# Patient Record
Sex: MALE | Race: WHITE | NOT HISPANIC OR LATINO | Employment: OTHER | URBAN - NONMETROPOLITAN AREA
[De-identification: names, ages, dates, MRNs, and addresses within clinical notes are randomized per-mention and may not be internally consistent; named-entity substitution may affect disease eponyms.]

---

## 2017-03-06 ENCOUNTER — OFFICE VISIT (OUTPATIENT)
Dept: PSYCHIATRY | Facility: OTHER | Age: 37
End: 2017-03-06
Attending: PSYCHIATRY & NEUROLOGY
Payer: COMMERCIAL

## 2017-03-06 VITALS
HEART RATE: 122 BPM | BODY MASS INDEX: 28.49 KG/M2 | WEIGHT: 215 LBS | DIASTOLIC BLOOD PRESSURE: 72 MMHG | HEIGHT: 73 IN | SYSTOLIC BLOOD PRESSURE: 128 MMHG | TEMPERATURE: 98.2 F | OXYGEN SATURATION: 96 %

## 2017-03-06 DIAGNOSIS — F31.10 BIPOLAR I DISORDER, MOST RECENT EPISODE (OR CURRENT) MANIC (H): ICD-10-CM

## 2017-03-06 DIAGNOSIS — G47.00 PERSISTENT DISORDER OF INITIATING OR MAINTAINING SLEEP: Primary | ICD-10-CM

## 2017-03-06 PROCEDURE — 93010 ELECTROCARDIOGRAM REPORT: CPT | Performed by: INTERNAL MEDICINE

## 2017-03-06 PROCEDURE — 93005 ELECTROCARDIOGRAM TRACING: CPT

## 2017-03-06 PROCEDURE — 99214 OFFICE O/P EST MOD 30 MIN: CPT | Mod: 25 | Performed by: PSYCHIATRY & NEUROLOGY

## 2017-03-06 PROCEDURE — 99212 OFFICE O/P EST SF 10 MIN: CPT | Mod: 25

## 2017-03-06 RX ORDER — DOXEPIN 6 MG/1
6 TABLET, FILM COATED ORAL AT BEDTIME
Qty: 30 TABLET | Refills: 6 | Status: SHIPPED | OUTPATIENT
Start: 2017-03-06 | End: 2017-03-14

## 2017-03-06 ASSESSMENT — ANXIETY QUESTIONNAIRES
6. BECOMING EASILY ANNOYED OR IRRITABLE: MORE THAN HALF THE DAYS
5. BEING SO RESTLESS THAT IT IS HARD TO SIT STILL: MORE THAN HALF THE DAYS
GAD7 TOTAL SCORE: 14
IF YOU CHECKED OFF ANY PROBLEMS ON THIS QUESTIONNAIRE, HOW DIFFICULT HAVE THESE PROBLEMS MADE IT FOR YOU TO DO YOUR WORK, TAKE CARE OF THINGS AT HOME, OR GET ALONG WITH OTHER PEOPLE: SOMEWHAT DIFFICULT
2. NOT BEING ABLE TO STOP OR CONTROL WORRYING: MORE THAN HALF THE DAYS
1. FEELING NERVOUS, ANXIOUS, OR ON EDGE: SEVERAL DAYS
3. WORRYING TOO MUCH ABOUT DIFFERENT THINGS: MORE THAN HALF THE DAYS
7. FEELING AFRAID AS IF SOMETHING AWFUL MIGHT HAPPEN: NEARLY EVERY DAY

## 2017-03-06 ASSESSMENT — PATIENT HEALTH QUESTIONNAIRE - PHQ9: 5. POOR APPETITE OR OVEREATING: MORE THAN HALF THE DAYS

## 2017-03-06 ASSESSMENT — PAIN SCALES - GENERAL: PAINLEVEL: NO PAIN (0)

## 2017-03-06 NOTE — MR AVS SNAPSHOT
"              After Visit Summary   3/6/2017    Jani Langford    MRN: [id number]           Patient Information     Date Of Birth          1980        Visit Information        Provider Department      3/6/2017 10:00 AM Renee Vuong MD [facility]        Today's Diagnoses     Persistent disorder of initiating or maintaining sleep    -  1    Bipolar I disorder, most recent episode (or current) manic (H)           Follow-ups after your visit        Your next 10 appointments already scheduled     May 08, 2017  9:00 AM CDT   (Arrive by 8:45 AM)   Return Visit with MD Katie Castilloview [facility] ([facility])    [address] [place] [id number] [phone]              Who to contact     If you have questions or need follow up information about today's clinic visit or your schedule please contact [facility] directly at [phone].  Normal or non-critical lab and imaging results will be communicated to you by MyChart, letter or phone within 4 business days after the clinic has received the results. If you do not hear from us within 7 days, please contact the clinic through MyChart or phone. If you have a critical or abnormal lab result, we will notify you by phone as soon as possible.  Submit refill requests through [employer] or call your pharmacy and they will forward the refill request to us. Please allow 3 business days for your refill to be completed.          Additional Information About Your Visit        MyChart Information     [employer] lets you send messages to your doctor, view your test results, renew your prescriptions, schedule appointments and more. To sign up, go to [email or web address] . Click on \"Log in\" on the left side of the screen, which will take you to the Welcome page. Then click on \"Sign up Now\" on the right side of the page.     You will be asked to enter the access code listed below, as well as some personal information. " "Please follow the directions to create your username and password.     Your access code is: [email or web address]  Expires: 3/14/2017  2:23 PM     Your access code will  in 90 days. If you need help or a new code, please call your [place] clinic or [phone].        Care EveryWhere ID     This is your Care EveryWhere ID. This could be used by other organizations to access your [place] medical records  [id number]        Your Vitals Were     Pulse Temperature Height Pulse Oximetry BMI (Body Mass Index)       122 98.2  F (36.8  C) (Tympanic) 6' 1\" (1.854 m) 96% 28.37 kg/m2        Blood Pressure from Last 3 Encounters:   17 128/72   16 110/75   16 114/66    Weight from Last 3 Encounters:   17 215 lb (97.5 kg)   16 220 lb (99.8 kg)   16 215 lb (97.5 kg)              We Performed the Following     EKG 12-lead complete w/read - (Clinic Performed)          Today's Medication Changes          These changes are accurate as of: 3/6/17 10:25 AM.  If you have any questions, ask your nurse or doctor.               Start taking these medicines.        Dose/Directions    doxepin 6 MG tablet   Commonly known as:  SILENOR   Used for:  Persistent disorder of initiating or maintaining sleep   Started by:  Renee Vuong MD        Dose:  6 mg   Take 1 tablet (6 mg) by mouth At Bedtime   Quantity:  30 tablet   Refills:  6            Where to get your medicines      These medications were sent to [id number] - [place], [place] - [address] [address] [address] [facility] [address]     Phone:  [phone]     doxepin 6 MG tablet                Primary Care Provider Office Phone # Fax #    R Wilmer Mendez [phone]       [facility] [address] [place] [id number]        Thank you!     Thank you for choosing [facility]  for your care. Our goal is always to provide you with excellent care. Hearing back from our patients is one way we can " continue to improve our services. Please take a few minutes to complete the written survey that you may receive in the mail after your visit with us. Thank you!             Your Updated Medication List - Protect others around you: Learn how to safely use, store and throw away your medicines at www.disposemymeds.org.          This list is accurate as of: 3/6/17 10:25 AM.  Always use your most recent med list.                   Brand Name Dispense Instructions for use    albuterol 108 (90 BASE) MCG/ACT Inhaler    PROAIR HFA/PROVENTIL HFA/VENTOLIN HFA    1 Inhaler    Inhale 2 puffs into the lungs every 6 hours as needed for shortness of breath / dyspnea or wheezing       doxepin 6 MG tablet    SILENOR    30 tablet    Take 1 tablet (6 mg) by mouth At Bedtime       nicotine 21 MG/24HR 24 hr patch    NICODERM CQ    30 patch    Place 1 patch onto the skin every 24 hours       * QUEtiapine 50 MG Tb24 24 hr tablet    SEROQUEL XR    60 each    Take 2 tablets (100 mg) by mouth daily       * QUEtiapine 300 MG 24 hr tablet    SEROQUEL XR    60 tablet    Take 2 tablets (600 mg) by mouth At Bedtime       * Notice:  This list has 2 medication(s) that are the same as other medications prescribed for you. Read the directions carefully, and ask your doctor or other care provider to review them with you.

## 2017-03-06 NOTE — NURSING NOTE
"Chief Complaint   Patient presents with     RECHECK     mental health- medication questions       Initial /72 (BP Location: Left arm, Patient Position: Chair, Cuff Size: Adult Regular)  Pulse 122  Temp 98.2  F (36.8  C) (Tympanic)  Ht 6' 1\" (1.854 m)  Wt 215 lb (97.5 kg)  SpO2 96%  BMI 28.37 kg/m2 Estimated body mass index is 28.37 kg/(m^2) as calculated from the following:    Height as of this encounter: 6' 1\" (1.854 m).    Weight as of this encounter: 215 lb (97.5 kg).  Medication Reconciliation: complete     Amy Morrissey LPN      "

## 2017-03-06 NOTE — PROGRESS NOTES
PSYCHIATRY CLINIC PROGRESS NOTE   20 minute medication management, more than 50% of time spent counseling patient on medications, medication side effects, symptom history and management   SUBJECTIVE / INTERIM HISTORY                                                                        Social- with ISABEL Sandhu and her kids Children- Phoebe's kids are 5, 7 , and 10 yo  Last visit 12/2016:  Continue Seroquel  mg am and 600 mg of XR HS. Taking Mirapex for ~2 days now...    - still not sleeping much: 4 hours, 6 would be good if he could.   - only sleeping couple hours still. Waking up still and it's getting worse. Not anxious or worried, just physically can't sleep. Dad Doxepin and Jani wonders if he oculd take  -  fairly recently diagnosed with bipolar disorder - was seeing Nallely at [facility] in [place]  -  Thinks Seroquel helps somewhat and has been on higher dose in past of 800-900.  - aggressive in past while manic.  SUBSTANCE USE- smokes 1/2 PPD since age 17 yo.  past use  Meth will be year in about Kitty clean. , MJ. tx [place] and [facility]. EtOH now rare.    SYMPTOMS-  + middle and early insomnia.  nightmares, flashbacks, detached, angry outbursts, self-destructive, startle response, hypervigilance and fear Sleep now: about 2-3 hours then up for awhile, then another hour. His goal would be 6 continuous.  MEDICAL ROS-  SOB: usually at night. But sometimes during day as well not even with exertion.  increased appetite / weight  MEDICAL / SURGICAL HISTORY                   Patient Active Problem List   Diagnosis     Bipolar disorder with psychotic features (H)     Insomnia     Attention deficit disorder     Posttraumatic stress disorder     ALLERGY   Bee venom; Latex; Percocet [oxycodone-acetaminophen]; Toradol; and Tramadol  MEDICATIONS                                                                                             Current Outpatient Prescriptions   Medication Sig     albuterol (PROAIR HFA,  "PROVENTIL HFA, VENTOLIN HFA) 108 (90 BASE) MCG/ACT inhaler Inhale 2 puffs into the lungs every 6 hours as needed for shortness of breath / dyspnea or wheezing     nicotine (NICODERM CQ) 21 MG/24HR patch 2h hr Place 1 patch onto the skin every 24 hours     QUEtiapine (SEROQUEL XR) 50 MG TB24 Take 2 tablets (100 mg) by mouth daily     QUEtiapine (SEROQUEL XR) 300 MG 24 hr tablet Take 2 tablets (600 mg) by mouth At Bedtime     No current facility-administered medications for this visit.          PAST MEDICATION TRIALS    Prozac (fluoxetine), Zoloft (sertraline), Paxil (paroxetine) and Cymbalta (duloxetine). Paxil sexual SEs and constipation. Mirtazapine ineffective for insomnia.   Elavil (amitriptyline).   Lithium Carbonate, Depakote and Depakot ER (valproate) and Neurontin (gabapentin). Lithium \"that was like eating pennies and nickels\" but helped / effective. Didn't like the blood level draws with lithium   Seroquel (quetiapine).   no older antipsychotics.   Xanax (alprazolam). Doesn't think has been on Buspar   Ambien (zolpidem) and Lunesta (eszopiclone).      VITALS   /72 (BP Location: Left arm, Patient Position: Chair, Cuff Size: Adult Regular)  Pulse 122  Temp 98.2  F (36.8  C) (Tympanic)  Ht 6' 1\" (1.854 m)  Wt 215 lb (97.5 kg)  SpO2 96%  BMI 28.37 kg/m2     PHQ9                     [unfilled]  LABS                                                                                                                         No labs in EMR   MENTAL STATUS EXAM                                                                                        Alert. Oriented to person, place, and date / time. Well groomed, calm, cooperative with good eye contact. No problems with speech or psychomotor behavior. Mood was described as tired and affect was congruent to speech content and full range. Thought process, including associations, was unremarkable and thought content was devoid of suicidal and homicidal ideation and " psychotic thought. No hallucinations. Insight was good. Judgment was intact and adequate for safety. Fund of knowledge was intact. Pt demonstrates no obvious problems with attention, concentration, language, recent or remote memory although these were not formally tested.     ASSESSMENT                                                                                                      HISTORICAL:  Initial psych note 9/14/16        NOTES:    Jani Langford is a 37 yo male with history of bipolar disorder, PTSD and chem dep - on Seroquel total 700 mg and been on for awhile. Main issue is sleep : has not had luck with sleep meds so discussed I will certainly try but given he has not found past meds effective more unlikely we are going to find med that works. WE tried Remeron with no luck. We tried Topamax and no luck. He saw Dr. Garcia  and started on Mirapex. Today we agreed try Doxepin - I want to check EKG though because this is a tCA and he is on Seroquel thus both with risk QTc prolongation... He's fine with this.      TREATMENT RISK STATEMENT:  The risks, benefits, alternatives and potential adverse effects have been explained and are understood by the pt.  The pt agrees to the treatment plan with the ability to do so.   The pt knows to call the clinic for any problems or access emergency care if needed.        DIAGNOSES                 (Use of Axes system will continue, even though absent from DSM 5)         Axis I   - Bipolar I disorder with history of psychotic features  Axis II  - no dx  Axis III - no major medical issues  Axis IV-  Psychosocial Stressors include: mod  Axis V - Global Assessment of Functioning current: 45    PLAN                                                                                                                    1)  MEDICATIONS:         -- Continue Seroquel  mg am and 600 mg of XR HS. Start Doxepin 6 mg HS    2)  THERAPY:  No Change    3)  LABS: EKG today. I asked if we  could get: lipid panel and glucose, LFTs, BMP including fasting glucose but he ate breakfast today    4)  PT MONITOR [call for probs]:  Worsening symptoms, SI/HI, SEs from meds    5)  REFERRALS [CD, medical, other]:  None    6)  RTC:  ~2 months

## 2017-03-07 ASSESSMENT — PATIENT HEALTH QUESTIONNAIRE - PHQ9: SUM OF ALL RESPONSES TO PHQ QUESTIONS 1-9: 13

## 2017-03-07 ASSESSMENT — ANXIETY QUESTIONNAIRES: GAD7 TOTAL SCORE: 14

## 2017-03-14 ENCOUNTER — OFFICE VISIT (OUTPATIENT)
Dept: BEHAVIORAL HEALTH | Facility: OTHER | Age: 37
End: 2017-03-14
Attending: SOCIAL WORKER
Payer: COMMERCIAL

## 2017-03-14 ENCOUNTER — OFFICE VISIT (OUTPATIENT)
Dept: FAMILY MEDICINE | Facility: OTHER | Age: 37
End: 2017-03-14
Attending: FAMILY MEDICINE
Payer: COMMERCIAL

## 2017-03-14 VITALS
WEIGHT: 211 LBS | HEIGHT: 73 IN | SYSTOLIC BLOOD PRESSURE: 104 MMHG | DIASTOLIC BLOOD PRESSURE: 64 MMHG | TEMPERATURE: 97.3 F | BODY MASS INDEX: 27.96 KG/M2 | OXYGEN SATURATION: 96 % | HEART RATE: 93 BPM

## 2017-03-14 DIAGNOSIS — K21.9 GASTROESOPHAGEAL REFLUX DISEASE WITHOUT ESOPHAGITIS: Primary | ICD-10-CM

## 2017-03-14 DIAGNOSIS — R68.82 DECREASED SEX DRIVE: ICD-10-CM

## 2017-03-14 DIAGNOSIS — R69 DIAGNOSIS DEFERRED: Primary | ICD-10-CM

## 2017-03-14 PROCEDURE — 99212 OFFICE O/P EST SF 10 MIN: CPT

## 2017-03-14 PROCEDURE — 99213 OFFICE O/P EST LOW 20 MIN: CPT | Performed by: FAMILY MEDICINE

## 2017-03-14 PROCEDURE — 99207 ZZC NO CHARGE LOS: CPT | Performed by: SOCIAL WORKER

## 2017-03-14 ASSESSMENT — PAIN SCALES - GENERAL: PAINLEVEL: NO PAIN (0)

## 2017-03-14 NOTE — PROGRESS NOTES
"[facility]    Jani Langford, 36 year old, male presents with   Chief Complaint   Patient presents with     Gastrophageal Reflux     having acid reflux symptoms would like to dicuss medication options.      Other     Patient states his sex drive \"isn't there\" unsure if this is due to the medications he is taking.        PAST MEDICAL HISTORY:  History reviewed. No pertinent past medical history.    PAST SURGICAL HISTORY:  Past Surgical History   Procedure Laterality Date     Dental surgery       pulled 2 teeth month ago     Ent surgery       sinus       MEDICATIONS:  Prior to Admission medications    Medication Sig Start Date End Date Taking? Authorizing Provider   omeprazole (PRILOSEC) 20 MG CR capsule Take 1 capsule (20 mg) by mouth daily 3/14/17  Yes BERTIN Mendez MD   albuterol (PROAIR HFA, PROVENTIL HFA, VENTOLIN HFA) 108 (90 BASE) MCG/ACT inhaler Inhale 2 puffs into the lungs every 6 hours as needed for shortness of breath / dyspnea or wheezing 11/25/16  Yes BERTIN Mendez MD   QUEtiapine (SEROQUEL XR) 50 MG TB24 Take 2 tablets (100 mg) by mouth daily 9/14/16  Yes Renee Vuong MD   QUEtiapine (SEROQUEL XR) 300 MG 24 hr tablet Take 2 tablets (600 mg) by mouth At Bedtime 9/14/16  Yes Renee Vuong MD       ALLERGIES:     Allergies   Allergen Reactions     Bee Venom      Latex Hives     Percocet [Oxycodone-Acetaminophen]      Short of breath vomiting     Toradol      seizure     Tramadol      Short of breath and vomiting       ROS:  Constitutional, HEENT, cardiovascular, pulmonary, gi and gu systems are negative, except as otherwise noted.      EXAM:  /64 (BP Location: Left arm, Patient Position: Chair, Cuff Size: Adult Large)  Pulse 93  Temp 97.3  F (36.3  C) (Tympanic)  Ht 6' 1\" (1.854 m)  Wt 211 lb (95.7 kg)  SpO2 96%  BMI 27.84 kg/m2 Body mass index is 27.84 kg/(m^2).   GENERAL APPEARANCE: healthy, alert and no distress  EYES: Eyes grossly normal to inspection, PERRL and " conjunctivae and sclerae normal  RESP: lungs clear to auscultation - no rales, rhonchi or wheezes  CV: regular rates and rhythm, normal S1 S2, no S3 or S4 and no murmur, click or rub  ABDOMEN: soft, nontender, without hepatosplenomegaly or masses and bowel sounds normal  Lab/ X-ray  No results found for this or any previous visit (from the past 24 hour(s)).    ASSESSMENT/PLAN:    ICD-10-CM    1. Gastroesophageal reflux disease without esophagitis K21.9 omeprazole (PRILOSEC) 20 MG CR capsule. Start one daily.  We'll see him in 2 months for recheck.  Did discuss the importance for him to avoid late night meals and elevate the head of the bed.  He does smoke.  I told him to quit smoking.  He also takes in caffeine he'll try to reduce that.  He does drink rare alcohol I told him to try to minimize that  And he uses Advil gelcaps and I told him to use Tylenol.   2. Decreased sex drive R68.82 Probably from the Seroquel.  He will talk to a psychiatrist and he has no difficulty getting an erection it's just the psychological drive         R. Wilmer Mendez MD  March 14, 2017

## 2017-03-14 NOTE — NURSING NOTE
"Chief Complaint   Patient presents with     Gastrophageal Reflux     having acid reflux symptoms would like to dicuss medication options.      Other     Patient states his sex drive \"isn't there\" unsure if this is due to the medications he is taking.        Initial /64 (BP Location: Left arm, Patient Position: Chair, Cuff Size: Adult Large)  Pulse 93  Temp 97.3  F (36.3  C) (Tympanic)  Ht 6' 1\" (1.854 m)  Wt 211 lb (95.7 kg)  SpO2 96%  BMI 27.84 kg/m2 Estimated body mass index is 27.84 kg/(m^2) as calculated from the following:    Height as of this encounter: 6' 1\" (1.854 m).    Weight as of this encounter: 211 lb (95.7 kg).  Medication Reconciliation: complete     ANGEL MCCLELLAND      "

## 2017-03-14 NOTE — MR AVS SNAPSHOT
"              After Visit Summary   3/14/2017    Jani Langford    MRN: [id number]           Patient Information     Date Of Birth          1980        Visit Information        Provider Department      3/14/2017 11:00 AM BERTIN Mendez MD [facility]        Today's Diagnoses     Gastroesophageal reflux disease without esophagitis    -  1      Care Instructions    Call Dr. Vuong        Follow-ups after your visit        Your next 10 appointments already scheduled     May 08, 2017  9:00 AM CDT   (Arrive by 8:45 AM)   Return Visit with Renee Vuong MD   [facility] ([facility])    [address] [place] [id number] [phone]              Who to contact     If you have questions or need follow up information about today's clinic visit or your schedule please contact [facility] directly at [phone].  Normal or non-critical lab and imaging results will be communicated to you by MyChart, letter or phone within 4 business days after the clinic has received the results. If you do not hear from us within 7 days, please contact the clinic through MyChart or phone. If you have a critical or abnormal lab result, we will notify you by phone as soon as possible.  Submit refill requests through [employer] or call your pharmacy and they will forward the refill request to us. Please allow 3 business days for your refill to be completed.          Additional Information About Your Visit        MyChart Information     [employer] lets you send messages to your doctor, view your test results, renew your prescriptions, schedule appointments and more. To sign up, go to [email or web address] . Click on \"Log in\" on the left side of the screen, which will take you to the Welcome page. Then click on \"Sign up Now\" on the right side of the page.     You will be asked to enter the access code listed below, as well as some personal information. Please follow the directions to " "create your username and password.     Your access code is: [email or web address]  Expires: 3/14/2017  3:23 PM     Your access code will  in 90 days. If you need help or a new code, please call your [facility] or [phone].        Care EveryWhere ID     This is your Care EveryWhere ID. This could be used by other organizations to access your [place] medical records  [id number]        Your Vitals Were     Pulse Temperature Height Pulse Oximetry BMI (Body Mass Index)       93 97.3  F (36.3  C) (Tympanic) 6' 1\" (1.854 m) 96% 27.84 kg/m2        Blood Pressure from Last 3 Encounters:   17 104/64   17 128/72   16 110/75    Weight from Last 3 Encounters:   17 211 lb (95.7 kg)   17 215 lb (97.5 kg)   16 220 lb (99.8 kg)              Today, you had the following     No orders found for display         Today's Medication Changes          These changes are accurate as of: 3/14/17 11:03 AM.  If you have any questions, ask your nurse or doctor.               Stop taking these medicines if you haven't already. Please contact your care team if you have questions.     doxepin 6 MG tablet   Commonly known as:  SILENOR   Stopped by:  BERTIN Mendez MD                    Primary Care Provider Office Phone # Fax #    BERTNI Mendez [phone]       [place] [address]        Thank you!     Thank you for choosing [facility]  for your care. Our goal is always to provide you with excellent care. Hearing back from our patients is one way we can continue to improve our services. Please take a few minutes to complete the written survey that you may receive in the mail after your visit with us. Thank you!             Your Updated Medication List - Protect others around you: Learn how to safely use, store and throw away your medicines at www.disposemymeds.org.          This list is accurate as of: 3/14/17 11:03 AM.  Always " use your most recent med list.                   Brand Name Dispense Instructions for use    albuterol 108 (90 BASE) MCG/ACT Inhaler    PROAIR HFA/PROVENTIL HFA/VENTOLIN HFA    1 Inhaler    Inhale 2 puffs into the lungs every 6 hours as needed for shortness of breath / dyspnea or wheezing       * QUEtiapine 50 MG Tb24 24 hr tablet    SEROQUEL XR    60 each    Take 2 tablets (100 mg) by mouth daily       * QUEtiapine 300 MG 24 hr tablet    SEROQUEL XR    60 tablet    Take 2 tablets (600 mg) by mouth At Bedtime       * Notice:  This list has 2 medication(s) that are the same as other medications prescribed for you. Read the directions carefully, and ask your doctor or other care provider to review them with you.

## 2017-03-14 NOTE — MR AVS SNAPSHOT
After Visit Summary   3/14/2017    Jani Langford    MRN: [id number]           Patient Information     Date Of Birth          1980        Visit Information        Provider Department      3/14/2017 11:30 AM [facility]        Today's Diagnoses     Diagnosis deferred    -  1       Follow-ups after your visit        Your next 10 appointments already scheduled     Mar 14, 2017 11:30 AM CDT   (Arrive by 11:15 AM)   New Visit with Aida Nguyen [facility] [place] ([facility] [place] [facility]    [address] [place] [address]  [place] [id number] [phone]            May 08, 2017  9:00 AM CDT   (Arrive by 8:45 AM)   Return Visit with Renee Vuong MD   [facility] [place] ([facility] [place] [facility])    [address] [place] [address]  [place] [id number] [phone]            May 15, 2017  9:15 AM CDT   (Arrive by 9:00 AM)   SHORT with BERTIN Mendez MD   [facility] [place] ([facility] [place] [facility])    [address] [place] [address]  [place] [address]   [phone]              Who to contact     If you have questions or need follow up information about today's clinic visit or your schedule please contact [facility] directly at [phone].  Normal or non-critical lab and imaging results will be communicated to you by MyChart, letter or phone within 4 business days after the clinic has received the results. If you do not hear from us within 7 days, please contact the clinic through MyChart or phone. If you have a critical or abnormal lab result, we will notify you by phone as soon as possible.  Submit refill requests through ANPI or call your pharmacy and they will forward the refill request to us. Please allow 3 business days for your refill to be completed.          Additional Information About Your Visit        ClinverseharSnapguide Information     ANPI lets you send messages to your doctor, view your test results, renew your prescriptions, schedule appointments and  "more. To sign up, go to [email or web address] . Click on \"Log in\" on the left side of the screen, which will take you to the Welcome page. Then click on \"Sign up Now\" on the right side of the page.     You will be asked to enter the access code listed below, as well as some personal information. Please follow the directions to create your username and password.     Your access code is: [email or web address]  Expires: 3/14/2017  3:23 PM     Your access code will  in 90 days. If you need help or a new code, please call your [place] clinic or [phone].        Care EveryWhere ID     This is your Care EveryWhere ID. This could be used by other organizations to access your [place] medical records  [id number]         Blood Pressure from Last 3 Encounters:   17 104/64   17 128/72   16 110/75    Weight from Last 3 Encounters:   17 211 lb (95.7 kg)   17 215 lb (97.5 kg)   16 220 lb (99.8 kg)              Today, you had the following     No orders found for display         Today's Medication Changes          These changes are accurate as of: 3/14/17 11:12 AM.  If you have any questions, ask your nurse or doctor.               Start taking these medicines.        Dose/Directions    omeprazole 20 MG CR capsule   Commonly known as:  priLOSEC   Used for:  Gastroesophageal reflux disease without esophagitis   Started by:  BERTIN Mendez MD        Dose:  20 mg   Take 1 capsule (20 mg) by mouth daily   Quantity:  30 capsule   Refills:  3         Stop taking these medicines if you haven't already. Please contact your care team if you have questions.     doxepin 6 MG tablet   Commonly known as:  SILENOR   Stopped by:  BERTIN Mendez MD                Where to get your medicines      These medications were sent to [id number] - [place] - [address]  [address] [address]     Phone:  [phone]     omeprazole 20 MG CR capsule                Primary Care " Provider Office Phone # Fax #    R Wilmer Mendez [phone]       [facility] [place] [address] [place], [place] [address] [address]        Thank you!     Thank you for choosing [facility]  for your care. Our goal is always to provide you with excellent care. Hearing back from our patients is one way we can continue to improve our services. Please take a few minutes to complete the written survey that you may receive in the mail after your visit with us. Thank you!             Your Updated Medication List - Protect others around you: Learn how to safely use, store and throw away your medicines at www.disposemymeds.org.          This list is accurate as of: 3/14/17 11:12 AM.  Always use your most recent med list.                   Brand Name Dispense Instructions for use    albuterol 108 (90 BASE) MCG/ACT Inhaler    PROAIR HFA/PROVENTIL HFA/VENTOLIN HFA    1 Inhaler    Inhale 2 puffs into the lungs every 6 hours as needed for shortness of breath / dyspnea or wheezing       omeprazole 20 MG CR capsule    priLOSEC    30 capsule    Take 1 capsule (20 mg) by mouth daily       * QUEtiapine 50 MG Tb24 24 hr tablet    SEROQUEL XR    60 each    Take 2 tablets (100 mg) by mouth daily       * QUEtiapine 300 MG 24 hr tablet    SEROQUEL XR    60 tablet    Take 2 tablets (600 mg) by mouth At Bedtime       * Notice:  This list has 2 medication(s) that are the same as other medications prescribed for you. Read the directions carefully, and ask your doctor or other care provider to review them with you.

## 2017-03-14 NOTE — PROGRESS NOTES
BEHAVIORAL HEALTH CLINICIAN introduced and explained integrated health model, brief therapy interventions and referral and support services for ongoing therapy interventions.     Aida Nguyen, MSW, LICSW, [facility]

## 2017-05-08 ENCOUNTER — OFFICE VISIT (OUTPATIENT)
Dept: PSYCHIATRY | Facility: OTHER | Age: 37
End: 2017-05-08
Attending: PSYCHIATRY & NEUROLOGY
Payer: COMMERCIAL

## 2017-05-08 VITALS
TEMPERATURE: 98.4 F | BODY MASS INDEX: 29.16 KG/M2 | HEIGHT: 73 IN | DIASTOLIC BLOOD PRESSURE: 68 MMHG | WEIGHT: 220 LBS | SYSTOLIC BLOOD PRESSURE: 120 MMHG | HEART RATE: 93 BPM

## 2017-05-08 DIAGNOSIS — F31.30 BIPOLAR I DISORDER, MOST RECENT EPISODE DEPRESSED (H): ICD-10-CM

## 2017-05-08 DIAGNOSIS — Z13.220 SCREENING FOR HYPERLIPIDEMIA: ICD-10-CM

## 2017-05-08 DIAGNOSIS — G47.00 PERSISTENT DISORDER OF INITIATING OR MAINTAINING SLEEP: Primary | ICD-10-CM

## 2017-05-08 DIAGNOSIS — Z13.1 SCREENING FOR DIABETES MELLITUS: ICD-10-CM

## 2017-05-08 LAB
ALBUMIN SERPL-MCNC: 4.2 G/DL (ref 3.4–5)
ALP SERPL-CCNC: 154 U/L (ref 40–150)
ALT SERPL W P-5'-P-CCNC: 54 U/L (ref 0–70)
ANION GAP SERPL CALCULATED.3IONS-SCNC: 7 MMOL/L (ref 3–14)
AST SERPL W P-5'-P-CCNC: 27 U/L (ref 0–45)
BILIRUB DIRECT SERPL-MCNC: 0.1 MG/DL (ref 0–0.2)
BILIRUB SERPL-MCNC: 0.4 MG/DL (ref 0.2–1.3)
BUN SERPL-MCNC: 7 MG/DL (ref 7–30)
CALCIUM SERPL-MCNC: 9 MG/DL (ref 8.5–10.1)
CHLORIDE SERPL-SCNC: 107 MMOL/L (ref 94–109)
CHOLEST SERPL-MCNC: 258 MG/DL
CO2 SERPL-SCNC: 26 MMOL/L (ref 20–32)
CREAT SERPL-MCNC: 0.92 MG/DL (ref 0.66–1.25)
GFR SERPL CREATININE-BSD FRML MDRD: NORMAL ML/MIN/1.7M2
GLUCOSE SERPL-MCNC: 97 MG/DL (ref 70–99)
HDLC SERPL-MCNC: 27 MG/DL
LDLC SERPL CALC-MCNC: ABNORMAL MG/DL
NONHDLC SERPL-MCNC: 231 MG/DL
POTASSIUM SERPL-SCNC: 3.8 MMOL/L (ref 3.4–5.3)
PROT SERPL-MCNC: 8.4 G/DL (ref 6.8–8.8)
SODIUM SERPL-SCNC: 140 MMOL/L (ref 133–144)
TRIGL SERPL-MCNC: 403 MG/DL

## 2017-05-08 PROCEDURE — 80061 LIPID PANEL: CPT | Mod: ZL | Performed by: PSYCHIATRY & NEUROLOGY

## 2017-05-08 PROCEDURE — 80048 BASIC METABOLIC PNL TOTAL CA: CPT | Mod: ZL | Performed by: PSYCHIATRY & NEUROLOGY

## 2017-05-08 PROCEDURE — 99212 OFFICE O/P EST SF 10 MIN: CPT

## 2017-05-08 PROCEDURE — 80076 HEPATIC FUNCTION PANEL: CPT | Mod: ZL | Performed by: PSYCHIATRY & NEUROLOGY

## 2017-05-08 PROCEDURE — 99213 OFFICE O/P EST LOW 20 MIN: CPT | Performed by: PSYCHIATRY & NEUROLOGY

## 2017-05-08 PROCEDURE — 36415 COLL VENOUS BLD VENIPUNCTURE: CPT | Mod: ZL | Performed by: PSYCHIATRY & NEUROLOGY

## 2017-05-08 ASSESSMENT — ANXIETY QUESTIONNAIRES
5. BEING SO RESTLESS THAT IT IS HARD TO SIT STILL: SEVERAL DAYS
3. WORRYING TOO MUCH ABOUT DIFFERENT THINGS: NEARLY EVERY DAY
1. FEELING NERVOUS, ANXIOUS, OR ON EDGE: MORE THAN HALF THE DAYS
GAD7 TOTAL SCORE: 17
IF YOU CHECKED OFF ANY PROBLEMS ON THIS QUESTIONNAIRE, HOW DIFFICULT HAVE THESE PROBLEMS MADE IT FOR YOU TO DO YOUR WORK, TAKE CARE OF THINGS AT HOME, OR GET ALONG WITH OTHER PEOPLE: VERY DIFFICULT
2. NOT BEING ABLE TO STOP OR CONTROL WORRYING: NEARLY EVERY DAY
7. FEELING AFRAID AS IF SOMETHING AWFUL MIGHT HAPPEN: NEARLY EVERY DAY
6. BECOMING EASILY ANNOYED OR IRRITABLE: NEARLY EVERY DAY

## 2017-05-08 ASSESSMENT — PATIENT HEALTH QUESTIONNAIRE - PHQ9: 5. POOR APPETITE OR OVEREATING: MORE THAN HALF THE DAYS

## 2017-05-08 ASSESSMENT — PAIN SCALES - GENERAL: PAINLEVEL: NO PAIN (0)

## 2017-05-08 NOTE — LETTER
May 8, 2017      TO: Jani Langford  [address] [place] [address]         Dear Mr. Jani Langford,    Lab results from today. Triglycerides high and I wanted to check in particular because Seroquel is an atypical antipsychotic of which that close can increase chance for high cholesterol and triglycerides as well as for glucose and for weight gain.          Here are the results:  Lab Results   Component Value Date    CHOL 258 05/08/2017     Lab Results   Component Value Date    HDL 27 05/08/2017     Lab Results   Component Value Date    LDL  05/08/2017     Cannot estimate LDL when triglyceride exceeds 400 mg/dL     Lab Results   Component Value Date    TRIG 403 05/08/2017     No results found for: CHOLHDLRATIO    Desired or goal levels are:  CHOLESTEROL: Desirable is less than 200.   HDL (Good Cholesterol): Desirable is greater than 40 (for men) greater than 50 (for women).  LDL (Bad Cholesterol): Desirable is less than 130 (or less than 100 if you have heart disease or diabetes). Borderline 130-160.  TRIGLYCERIDES: Desirable is less than 150.  Borderline is 150-200.      As you may know, an elevated cholesterol is one factor that increases your risk for heart disease and stroke. You can improve your cholesterol by controlling the amount and type of fat you eat and by increasing your daily activity level.    Here are some ways to improve your nutrition:  Eat less fat (especially butter, Crisco and other saturated fats)  Buy lean cuts of meat, reduce your portions of red meat or substitute poultry or fish  Use skim milk and low-fat dairy products  Eat no more than 4 egg yolks per week  Avoid fried or fast foods that are high in fat  Eat more fruits and vegetables      Also consider starting or increasing your aerobic activity. Aerobic activity is the best way to improve HDL (good) cholesterol. If this would be new to you, please talk with me first about what activities are safe for you.      Glucose was in normal  limits at 97.     Liver Function Studies -   Recent Labs   Lab Test  05/08/17   0919   PROTTOTAL  8.4   ALBUMIN  4.2   BILITOTAL  0.4   ALKPHOS  154*   AST  27   ALT  54     Everything with liver normal except alkaline phosphatase just bit above normal (range at our lab up to 150). It's at a level however that I feel urgent need further labs, etc.    Sincerely,      Renee Vuong MD

## 2017-05-08 NOTE — NURSING NOTE
"Chief Complaint   Patient presents with     RECHECK     mental health.  Patient reports that he is not sleeping well and is getting worse.  Insurance will  not cover Doxepin       Initial /68 (BP Location: Right arm, Patient Position: Chair, Cuff Size: Adult Regular)  Pulse 93  Temp 98.4  F (36.9  C) (Tympanic)  Ht 6' 1\" (1.854 m)  Wt 220 lb (99.8 kg)  BMI 29.03 kg/m2 Estimated body mass index is 29.03 kg/(m^2) as calculated from the following:    Height as of this encounter: 6' 1\" (1.854 m).    Weight as of this encounter: 220 lb (99.8 kg).  Medication Reconciliation: china GARCIA      "

## 2017-05-08 NOTE — PROGRESS NOTES
PSYCHIATRY CLINIC PROGRESS NOTE   20 minute medication management, more than 50% of time spent counseling patient on medications, medication side effects, symptom history and management   SUBJECTIVE / INTERIM HISTORY                                                                        Social- with ISABEL Sandhu and her kids Children- Phoebe's kids are 5, 7 , and 10 yo  Last visit 3/6/17:  Continue Seroquel  mg am and 600 mg of XR HS. Start Doxepin 6 mg HS    - still not sleeping much: 4 hours, 6 would be good if he could. Wakes up 5-6 times per night.   - attitude lately largely because no sleep  - only sleeping couple hours still. Waking up still and it's getting worse. Not anxious or worried, just physically can't sleep.   -  fairly recently diagnosed with bipolar disorder - was seeing Nallely at [facility] in [place]  -  Thinks Seroquel helps somewhat and has been on higher dose in past of 800-900.  - aggressive in past while manic.  SUBSTANCE USE- smokes 1/2 PPD since age 15 yo.  past use  Meth will be year in about Kitty clean. , ALFONSO. tx [place] and [facility]. EtOH now rare.    SYMPTOMS-  + middle and early insomnia.  nightmares, flashbacks, detached, angry outbursts, self-destructive, startle response, hypervigilance and fear Sleep now: about 2-3 hours then up for awhile, then another hour. His goal would be 6 continuous.  MEDICAL ROS-  SOB: usually at night. But sometimes during day as well not even with exertion.  increased appetite / weight  MEDICAL / SURGICAL HISTORY                   Patient Active Problem List   Diagnosis     Bipolar disorder with psychotic features (H)     Insomnia     History of mental disorder     Attention deficit disorder     Posttraumatic stress disorder     Decreased sex drive     Gastroesophageal reflux disease without esophagitis     ALLERGY   Bee venom; Latex; Percocet [oxycodone-acetaminophen]; Toradol; and Tramadol  MEDICATIONS                                                  "                                            Current Outpatient Prescriptions   Medication Sig     albuterol (PROAIR HFA, PROVENTIL HFA, VENTOLIN HFA) 108 (90 BASE) MCG/ACT inhaler Inhale 2 puffs into the lungs every 6 hours as needed for shortness of breath / dyspnea or wheezing     QUEtiapine (SEROQUEL XR) 50 MG TB24 Take 2 tablets (100 mg) by mouth daily     QUEtiapine (SEROQUEL XR) 300 MG 24 hr tablet Take 2 tablets (600 mg) by mouth At Bedtime     omeprazole (PRILOSEC) 20 MG CR capsule Take 1 capsule (20 mg) by mouth daily (Patient not taking: Reported on 5/8/2017)     No current facility-administered medications for this visit.          PAST MEDICATION TRIALS    Prozac (fluoxetine), Zoloft (sertraline), Paxil (paroxetine) and Cymbalta (duloxetine). Paxil sexual SEs and constipation. Mirtazapine ineffective for insomnia.   Elavil (amitriptyline).   Lithium Carbonate, Depakote and Depakot ER (valproate) and Neurontin. Topamax didn't help (gabapentin). Lithium \"that was like eating pennies and nickels\" but helped / effective. Didn't like the blood level draws with lithium. Doesn't think has been on tegretol or trileptal.   Seroquel (quetiapine).   no older antipsychotics.   Xanax (alprazolam). Doesn't think has been on Buspar   Ambien (zolpidem) and Lunesta (eszopiclone). Lunesta helped initial insomnia, NOT middle. Ambien made him violent and agitated.       VITALS   /68 (BP Location: Right arm, Patient Position: Chair, Cuff Size: Adult Regular)  Pulse 93  Temp 98.4  F (36.9  C) (Tympanic)  Ht 6' 1\" (1.854 m)  Wt 220 lb (99.8 kg)  BMI 29.03 kg/m2     PHQ9                     [unfilled]  LABS                                                                                                                            ms (on Seroquel) March 2017    MENTAL STATUS EXAM                                                                                        Alert. Oriented to person, place, and date " / time. Casually groomed, calm, cooperative with good eye contact. No problems with speech or psychomotor behavior. Mood was described as tired and affect was congruent to speech content and full range. Thought process, including associations, was unremarkable and thought content was devoid of suicidal and homicidal ideation and psychotic thought. No hallucinations. Insight was good. Judgment was intact and adequate for safety. Fund of knowledge was intact. Pt demonstrates no obvious problems with attention, concentration, language, recent or remote memory although these were not formally tested.     ASSESSMENT                                                                                                      HISTORICAL:  Initial psych note 9/14/16        NOTES:    Jani Langford is a 37 yo male with history of bipolar disorder, PTSD and chem dep - on Seroquel total 700 mg and been on for awhile. Main issue is sleep : has not had luck with sleep meds so discussed I will certainly try but given he has not found past meds effective more unlikely we are going to find med that works. We tried Remeron with no luck. We tried Topamax and no luck. He saw Dr. Garcia  and started on Mirapex. Last visit we were going to try Doxepin but insurance wouldn't cover. Today we discussed trying Belsomra      TREATMENT RISK STATEMENT:  The risks, benefits, alternatives and potential adverse effects have been explained and are understood by the pt.  The pt agrees to the treatment plan with the ability to do so.   The pt knows to call the clinic for any problems or access emergency care if needed.        DIAGNOSES                 (Use of Axes system will continue, even though absent from DSM 5)         Axis I   - Bipolar I disorder with history of psychotic features  Axis II  - no dx  Axis III - no major medical issues  Axis IV-  Psychosocial Stressors include: mod  Axis V - Global Assessment of Functioning current: 45    PLAN                                                                                                                     1)  MEDICATIONS:         -- Continue Seroquel  mg am and 600 mg of XR HS. Start Belsomra 10 mg HS    2)  THERAPY:  No Change    3)  LABS:  lipid panel and glucose, LFTs, BMP including fasting glucose today    4)  PT MONITOR [call for probs]:  Worsening symptoms, SI/HI, SEs from meds    5)  REFERRALS [CD, medical, other]: will see if sleep medicine has any further ideas..    6)  RTC:  !5-6 weeks

## 2017-05-08 NOTE — MR AVS SNAPSHOT
After Visit Summary   5/8/2017    Jani Langford    MRN: [id number]           Patient Information     Date Of Birth          1980        Visit Information        Provider Department      5/8/2017 9:00 AM Renee Vuong MD [facility] [place]        Today's Diagnoses     Persistent disorder of initiating or maintaining sleep    -  1    Bipolar I disorder, most recent episode depressed (H)        Screening for hyperlipidemia        Screening for diabetes mellitus           Follow-ups after your visit        Additional Services     SLEEP EVALUATION & MANAGEMENT REFERRAL - ADULT       37 yo who was seen fall '15. Still insomnia and we have not had any luck with medications for insomnia aside from continuing Seroquel HS for bipolar disorder. List of meds he has been on in my notes.    Please be aware that coverage of these services is subject to the terms and limitations of your health insurance plan.  Call member services at your health plan with any benefit or coverage questions.      Please bring the following to your appointment:    >>   List of current medications   >>   This referral request   >>   Any documents/labs given to you for this referral                  Your next 10 appointments already scheduled     May 15, 2017  9:15 AM CDT   (Arrive by 9:00 AM)   SHORT with BERTIN Mendez MD   [facility] [place] [facility] [place] [facility]    [address] [place], [place] [address] [address]   [phone]            Jun 16, 2017  9:20 AM CDT   (Arrive by 9:05 AM)   Return Visit with Renee Vuong MD   [facility] [place] [facility] [place] [facility]    [address] [place] [address]  [place] [address] [phone]              Future tests that were ordered for you today     Open Future Orders        Priority Expected Expires Ordered    SLEEP EVALUATION & MANAGEMENT REFERRAL - ADULT Routine  5/8/2018 5/8/2017            Who to contact     If you have questions or need follow up information about today's  "clinic visit or your schedule please contact [facility] [place] directly at [phone].  Normal or non-critical lab and imaging results will be communicated to you by MyChart, letter or phone within 4 business days after the clinic has received the results. If you do not hear from us within 7 days, please contact the clinic through Btargethart or phone. If you have a critical or abnormal lab result, we will notify you by phone as soon as possible.  Submit refill requests through Spor or call your pharmacy and they will forward the refill request to us. Please allow 3 business days for your refill to be completed.          Additional Information About Your Visit        MyChart Information     Spor lets you send messages to your doctor, view your test results, renew your prescriptions, schedule appointments and more. To sign up, go to [email or web address] . Click on \"Log in\" on the left side of the screen, which will take you to the Welcome page. Then click on \"Sign up Now\" on the right side of the page.     You will be asked to enter the access code listed below, as well as some personal information. Please follow the directions to create your username and password.     Your access code is: [email or web address]  Expires: 2017  9:17 AM     Your access code will  in 90 days. If you need help or a new code, please call your [place] clinic or [phone].        Care EveryWhere ID     This is your Care EveryWhere ID. This could be used by other organizations to access your [place] medical records  [id number]        Your Vitals Were     Pulse Temperature Height BMI (Body Mass Index)          93 98.4  F (36.9  C) (Tympanic) 6' 1\" (1.854 m) 29.03 kg/m2         Blood Pressure from Last 3 Encounters:   17 120/68   17 104/64   17 128/72    Weight from Last 3 Encounters:   17 220 lb (99.8 kg)   17 211 lb (95.7 kg)   17 215 lb (97.5 kg)              We Performed the " Following     Basic metabolic panel     Hepatic panel (Albumin, ALT, AST, Bili, Alk Phos, TP)     Lipid Panel ([facility])          Today's Medication Changes          These changes are accurate as of: 5/8/17  9:17 AM.  If you have any questions, ask your nurse or doctor.               Start taking these medicines.        Dose/Directions    Suvorexant 10 MG tablet   Commonly known as:  BELSOMRA   Used for:  Persistent disorder of initiating or maintaining sleep   Started by:  Renee Vuong MD        Dose:  10 mg   Take 1 tablet (10 mg) by mouth nightly as needed   Quantity:  30 tablet   Refills:  5            Where to get your medicines      Some of these will need a paper prescription and others can be bought over the counter.  Ask your nurse if you have questions.     Bring a paper prescription for each of these medications     Suvorexant 10 MG tablet                Primary Care Provider Office Phone # Fax #    R Wilmer Mendez [phone]       [place] [id number]        Thank you!     Thank you for choosing [facility]  for your care. Our goal is always to provide you with excellent care. Hearing back from our patients is one way we can continue to improve our services. Please take a few minutes to complete the written survey that you may receive in the mail after your visit with us. Thank you!             Your Updated Medication List - Protect others around you: Learn how to safely use, store and throw away your medicines at www.disposemymeds.org.          This list is accurate as of: 5/8/17  9:17 AM.  Always use your most recent med list.                   Brand Name Dispense Instructions for use    albuterol 108 (90 BASE) MCG/ACT Inhaler    PROAIR HFA/PROVENTIL HFA/VENTOLIN HFA    1 Inhaler    Inhale 2 puffs into the lungs every 6 hours as needed for shortness of breath / dyspnea or wheezing       omeprazole 20 MG CR capsule    priLOSEC     30 capsule    Take 1 capsule (20 mg) by mouth daily       * QUEtiapine 50 MG Tb24 24 hr tablet    SEROQUEL XR    60 each    Take 2 tablets (100 mg) by mouth daily       * QUEtiapine 300 MG 24 hr tablet    SEROQUEL XR    60 tablet    Take 2 tablets (600 mg) by mouth At Bedtime       Suvorexant 10 MG tablet    BELSOMRA    30 tablet    Take 1 tablet (10 mg) by mouth nightly as needed       * Notice:  This list has 2 medication(s) that are the same as other medications prescribed for you. Read the directions carefully, and ask your doctor or other care provider to review them with you.

## 2017-05-09 ASSESSMENT — PATIENT HEALTH QUESTIONNAIRE - PHQ9: SUM OF ALL RESPONSES TO PHQ QUESTIONS 1-9: 12

## 2017-05-09 ASSESSMENT — ANXIETY QUESTIONNAIRES: GAD7 TOTAL SCORE: 17

## 2017-05-11 ENCOUNTER — OFFICE VISIT (OUTPATIENT)
Dept: SLEEP MEDICINE | Facility: HOSPITAL | Age: 37
End: 2017-05-11
Attending: PSYCHIATRY & NEUROLOGY
Payer: COMMERCIAL

## 2017-05-11 VITALS
SYSTOLIC BLOOD PRESSURE: 124 MMHG | DIASTOLIC BLOOD PRESSURE: 72 MMHG | OXYGEN SATURATION: 98 % | RESPIRATION RATE: 14 BRPM | HEART RATE: 74 BPM

## 2017-05-11 DIAGNOSIS — G47.00 PERSISTENT DISORDER OF INITIATING OR MAINTAINING SLEEP: ICD-10-CM

## 2017-05-11 PROCEDURE — 99212 OFFICE O/P EST SF 10 MIN: CPT

## 2017-05-11 PROCEDURE — 99211 OFF/OP EST MAY X REQ PHY/QHP: CPT

## 2017-05-11 NOTE — PROGRESS NOTES
His insomnia is intractable, no response to mirapex nor to any other meds that Dr Vuong and I have prescribed. He was very agitated today and was bordering on threatening me if I didn't do something to help his sleep. I quickly left the interview which was done in the presence of his wife and son, I will not see him again. I will drop a line to Dr Vuong about him.  /72  Pulse 74  Resp 14  SpO2 98%

## 2017-05-11 NOTE — MR AVS SNAPSHOT
"              After Visit Summary   5/11/2017    Jani Langford    MRN: [id number]           Patient Information     Date Of Birth          1980        Visit Information        Provider Department      5/11/2017 1:30 PM Jozef Garcia MD [facility]        Today's Diagnoses     Persistent disorder of initiating or maintaining sleep           Follow-ups after your visit        Your next 10 appointments already scheduled     May 15, 2017  9:15 AM CDT   (Arrive by 9:00 AM)   SHORT with BERTIN Mendez MD   [facility] [place] ([facility])    [address]  [place] [id number] [phone]            Jun 16, 2017  9:20 AM CDT   (Arrive by 9:05 AM)   Return Visit with Renee Vuong MD   [facility] ([facility])    [address] [place], [place] [address] [place] [address] [phone]              Who to contact     If you have questions or need follow up information about today's clinic visit or your schedule please contact [facility] directly at [phone].  Normal or non-critical lab and imaging results will be communicated to you by MyChart, letter or phone within 4 business days after the clinic has received the results. If you do not hear from us within 7 days, please contact the clinic through [employer] or phone. If you have a critical or abnormal lab result, we will notify you by phone as soon as possible.  Submit refill requests through [employer] or call your pharmacy and they will forward the refill request to us. Please allow 3 business days for your refill to be completed.          Additional Information About Your Visit        MyChart Information     [employer] lets you send messages to your doctor, view your test results, renew your prescriptions, schedule appointments and more. To sign up, go to [email or web address] . Click on \"Log in\" on the left side of the screen, which will take you to the Welcome page. Then click on \"Sign up Now\" on the right side of the page. "     You will be asked to enter the access code listed below, as well as some personal information. Please follow the directions to create your username and password.     Your access code is: [email or web address]  Expires: 2017  9:17 AM     Your access code will  in 90 days. If you need help or a new code, please call your [facility] or [phone].        Care EveryWhere ID     This is your Care EveryWhere ID. This could be used by other organizations to access your [place] medical records  [id number]        Your Vitals Were     Pulse Respirations Pulse Oximetry             74 14 98%          Blood Pressure from Last 3 Encounters:   17 124/72   17 120/68   17 104/64    Weight from Last 3 Encounters:   17 220 lb (99.8 kg)   17 211 lb (95.7 kg)   17 215 lb (97.5 kg)              We Performed the Following     SLEEP EVALUATION & MANAGEMENT REFERRAL - ADULT        Primary Care Provider Office Phone # Fax #    R Wilmer Mendez [phone]       [facility] [place] [address] [place], [place] [id number]        Thank you!     Thank you for choosing [facility]  for your care. Our goal is always to provide you with excellent care. Hearing back from our patients is one way we can continue to improve our services. Please take a few minutes to complete the written survey that you may receive in the mail after your visit with us. Thank you!             Your Updated Medication List - Protect others around you: Learn how to safely use, store and throw away your medicines at www.disposemymeds.org.          This list is accurate as of: 17  2:48 PM.  Always use your most recent med list.                   Brand Name Dispense Instructions for use    albuterol 108 (90 BASE) MCG/ACT Inhaler    PROAIR HFA/PROVENTIL HFA/VENTOLIN HFA    1 Inhaler    Inhale 2 puffs into the lungs every 6 hours as needed for shortness of breath / dyspnea or wheezing       omeprazole 20  MG CR capsule    priLOSEC    30 capsule    Take 1 capsule (20 mg) by mouth daily       * QUEtiapine 50 MG Tb24 24 hr tablet    SEROQUEL XR    60 each    Take 2 tablets (100 mg) by mouth daily       * QUEtiapine 300 MG 24 hr tablet    SEROQUEL XR    60 tablet    Take 2 tablets (600 mg) by mouth At Bedtime       Suvorexant 10 MG tablet    BELSOMRA    30 tablet    Take 1 tablet (10 mg) by mouth nightly as needed       * Notice:  This list has 2 medication(s) that are the same as other medications prescribed for you. Read the directions carefully, and ask your doctor or other care provider to review them with you.

## 2017-05-16 ENCOUNTER — TELEPHONE (OUTPATIENT)
Dept: PSYCHIATRY | Facility: OTHER | Age: 37
End: 2017-05-16

## 2017-05-16 NOTE — TELEPHONE ENCOUNTER
"Received PA form from [employer] for the medication Belsomra 10 mg.  Will compete, fax and wait for determination.  Originally PA was started on Cover [employer], but became \"hung up\" in the process.  "

## 2017-05-23 ENCOUNTER — TELEPHONE (OUTPATIENT)
Dept: PSYCHIATRY | Facility: OTHER | Age: 37
End: 2017-05-23

## 2017-05-23 NOTE — TELEPHONE ENCOUNTER
Received PA approval from [employer] for the medication Belsomra 10 mg.  Authorization for coverage is effective starting 17 and will  on 18.  Thank you.

## 2017-07-18 ENCOUNTER — OFFICE VISIT (OUTPATIENT)
Dept: PSYCHIATRY | Facility: OTHER | Age: 37
End: 2017-07-18
Attending: PSYCHIATRY & NEUROLOGY
Payer: COMMERCIAL

## 2017-07-18 VITALS
HEART RATE: 104 BPM | SYSTOLIC BLOOD PRESSURE: 124 MMHG | WEIGHT: 220 LBS | HEIGHT: 73 IN | TEMPERATURE: 98.7 F | DIASTOLIC BLOOD PRESSURE: 70 MMHG | BODY MASS INDEX: 29.16 KG/M2

## 2017-07-18 DIAGNOSIS — F41.1 GAD (GENERALIZED ANXIETY DISORDER): Primary | ICD-10-CM

## 2017-07-18 PROCEDURE — 99212 OFFICE O/P EST SF 10 MIN: CPT

## 2017-07-18 PROCEDURE — 99214 OFFICE O/P EST MOD 30 MIN: CPT | Performed by: PSYCHIATRY & NEUROLOGY

## 2017-07-18 RX ORDER — VENLAFAXINE HYDROCHLORIDE 75 MG/1
CAPSULE, EXTENDED RELEASE ORAL
Qty: 60 CAPSULE | Refills: 3 | Status: SHIPPED | OUTPATIENT
Start: 2017-07-18 | End: 2017-08-22

## 2017-07-18 ASSESSMENT — PAIN SCALES - GENERAL: PAINLEVEL: NO PAIN (0)

## 2017-07-18 ASSESSMENT — ANXIETY QUESTIONNAIRES
3. WORRYING TOO MUCH ABOUT DIFFERENT THINGS: NEARLY EVERY DAY
1. FEELING NERVOUS, ANXIOUS, OR ON EDGE: NEARLY EVERY DAY
GAD7 TOTAL SCORE: 17
IF YOU CHECKED OFF ANY PROBLEMS ON THIS QUESTIONNAIRE, HOW DIFFICULT HAVE THESE PROBLEMS MADE IT FOR YOU TO DO YOUR WORK, TAKE CARE OF THINGS AT HOME, OR GET ALONG WITH OTHER PEOPLE: VERY DIFFICULT
2. NOT BEING ABLE TO STOP OR CONTROL WORRYING: MORE THAN HALF THE DAYS
7. FEELING AFRAID AS IF SOMETHING AWFUL MIGHT HAPPEN: NEARLY EVERY DAY
5. BEING SO RESTLESS THAT IT IS HARD TO SIT STILL: SEVERAL DAYS
6. BECOMING EASILY ANNOYED OR IRRITABLE: NEARLY EVERY DAY

## 2017-07-18 ASSESSMENT — PATIENT HEALTH QUESTIONNAIRE - PHQ9: 5. POOR APPETITE OR OVEREATING: MORE THAN HALF THE DAYS

## 2017-07-18 NOTE — MR AVS SNAPSHOT
"              After Visit Summary   2017    Jani Langford    MRN: [id number]           Patient Information     Date Of Birth          1980        Visit Information        Provider Department      2017 3:20 PM Renee Vuong MD [facility]        Today's Diagnoses     TAMRA (generalized anxiety disorder)    -  1       Follow-ups after your visit        Who to contact     If you have questions or need follow up information about today's clinic visit or your schedule please contact [facility] directly at [phone].  Normal or non-critical lab and imaging results will be communicated to you by MyChart, letter or phone within 4 business days after the clinic has received the results. If you do not hear from us within 7 days, please contact the clinic through [facility] [employer] or phone. If you have a critical or abnormal lab result, we will notify you by phone as soon as possible.  Submit refill requests through [employer] or call your pharmacy and they will forward the refill request to us. Please allow 3 business days for your refill to be completed.          Additional Information About Your Visit        MyChart Information     [employer] lets you send messages to your doctor, view your test results, renew your prescriptions, schedule appointments and more. To sign up, go to [email or web address] . Click on \"Log in\" on the left side of the screen, which will take you to the Welcome page. Then click on \"Sign up Now\" on the right side of the page.     You will be asked to enter the access code listed below, as well as some personal information. Please follow the directions to create your username and password.     Your access code is: [email or web address]  Expires: 2017  9:17 AM     Your access code will  in 90 days. If you need help or a new code, please call your [facility] or [phone].        Care EveryWhere ID     This is your Care EveryWhere ID. This could be used by other " "organizations to access your [place] medical records  [id number]        Your Vitals Were     Pulse Temperature Height BMI (Body Mass Index)          104 98.7  F (37.1  C) (Tympanic) 6' 1\" (1.854 m) 29.03 kg/m2         Blood Pressure from Last 3 Encounters:   07/18/17 124/70   05/11/17 124/72   05/08/17 120/68    Weight from Last 3 Encounters:   07/18/17 220 lb (99.8 kg)   05/08/17 220 lb (99.8 kg)   03/14/17 211 lb (95.7 kg)              Today, you had the following     No orders found for display         Today's Medication Changes          These changes are accurate as of: 7/18/17  3:52 PM.  If you have any questions, ask your nurse or doctor.               Start taking these medicines.        Dose/Directions    venlafaxine 75 MG 24 hr capsule   Commonly known as:  EFFEXOR-XR   Used for:  TAMRA (generalized anxiety disorder)   Started by:  Renee Vuong MD        Take 1 capsule daily for 5 days then increase to 2 caps daily.   Quantity:  60 capsule   Refills:  3         Stop taking these medicines if you haven't already. Please contact your care team if you have questions.     Suvorexant 10 MG tablet   Commonly known as:  BELSOMRA   Stopped by:  Renee Vuong MD                Where to get your medicines      These medications were sent to [id number] - [place], [place] - [address]  [address] [address]     Phone:  [phone]     venlafaxine 75 MG 24 hr capsule                Primary Care Provider Office Phone # Fax #    R Wilmer Mendez [phone]       [facility] [place] [address] [place] [address]        Equal Access to Services     [facility] [place] campusBERTIN AH: Hadii kalia Bourgeois, waaxda luqadaha, qaybta kaalmada adeegyada, oh card. So [facility] [phone].    ATENCIÓN: Si habla español, tiene a davis disposición servicios gratuitos de asistencia lingüística. Llame al [phone].    We comply with applicable " federal civil rights laws and [place] laws. We do not discriminate on the basis of race, color, national origin, age, disability sex, sexual orientation or gender identity.            Thank you!     [facility] you [facility]  [facility] your care. Our goal is always to provide you with excellent care. Hearing back from our patients is one way we can continue to improve our services. Please take a few minutes to complete the written survey that you may receive in the mail after your visit with us. Thank you!             Your Updated Medication List - Protect others around you: Learn how to safely use, store and throw away your medicines at www.disposemymeds.org.          This list is accurate as of: 7/18/17  3:52 PM.  Always use your most recent med list.                   Brand Name Dispense Instructions for use Diagnosis    albuterol 108 (90 BASE) MCG/ACT Inhaler    PROAIR HFA/PROVENTIL HFA/VENTOLIN HFA    1 Inhaler    Inhale 2 puffs into the lungs every 6 hours as needed for shortness of breath / dyspnea or wheezing    Dyspnea, unspecified type       omeprazole 20 MG CR capsule    priLOSEC    30 capsule    Take 1 capsule (20 mg) by mouth daily    Gastroesophageal reflux disease without esophagitis       * QUEtiapine 50 MG Tb24 24 hr tablet    SEROQUEL XR    60 each    Take 2 tablets (100 mg) by mouth daily    Bipolar I disorder, most recent episode (or current) manic (H)       * QUEtiapine 300 MG 24 hr tablet    SEROQUEL XR    60 tablet    Take 2 tablets (600 mg) by mouth At Bedtime    Bipolar I disorder, most recent episode (or current) manic (H)       venlafaxine 75 MG 24 hr capsule    EFFEXOR-XR    60 capsule    Take 1 capsule daily for 5 days then increase to 2 caps daily.    TAMRA (generalized anxiety disorder)       * Notice:  This list has 2 medication(s) that are the same as other medications prescribed for you. Read the directions carefully, and ask your doctor or other care provider to  review them with you.

## 2017-07-18 NOTE — NURSING NOTE
"Chief Complaint   Patient presents with     RECHECK     Mental health  Stopped taking the Belsomra, did not help       Initial /70 (BP Location: Left arm, Patient Position: Sitting, Cuff Size: Adult Regular)  Pulse 104  Temp 98.7  F (37.1  C) (Tympanic)  Ht 6' 1\" (1.854 m)  Wt 220 lb (99.8 kg)  BMI 29.03 kg/m2 Estimated body mass index is 29.03 kg/(m^2) as calculated from the following:    Height as of this encounter: 6' 1\" (1.854 m).    Weight as of this encounter: 220 lb (99.8 kg).  Medication Reconciliation: complete   Viviana Valdez    "

## 2017-07-18 NOTE — PROGRESS NOTES
PSYCHIATRY CLINIC PROGRESS NOTE   20 minute medication management, more than 50% of time spent counseling patient on medications, medication side effects, symptom history and management   SUBJECTIVE / INTERIM HISTORY                                                                        Social- with ISABEL Sandhu and her kids Children- Phoebe's kids are 5, 7 , and 12 yo  Last visit :  -- Continue Seroquel  mg am and 600 mg of XR HS. Start Belsomra 10 mg HS    - still not sleeping much: 4 hours, 6 would be good if he could. Wakes up 5-6 times per night.   - attitude lately largely because no sleep  - only sleeping couple hours still. Waking up still and it's getting worse. Not anxious or worried, just physically can't sleep.   -  fairly recently diagnosed with bipolar disorder - was seeing Nallely at [facility] in [place]  -  Thinks Seroquel helps somewhat and has been on higher dose in past of 800-900.  - aggressive in past while manic.  SUBSTANCE USE- smokes 1/2 PPD since age 15 yo.  past use  Meth will be year in about Kitty clean. , ALFONSO. tx [place] and [facility]. EtOH now rare.    SYMPTOMS-  + middle and early insomnia.  nightmares, flashbacks, detached, angry outbursts, self-destructive, startle response, hypervigilance and fear Sleep now: about 2-3 hours then up for awhile, then another hour. His goal would be 6 continuous.  MEDICAL ROS-  SOB: usually at night. But sometimes during day as well not even with exertion.  increased appetite / weight  MEDICAL / SURGICAL HISTORY                   Patient Active Problem List   Diagnosis     Bipolar disorder with psychotic features (H)     Insomnia     History of mental disorder     Attention deficit disorder     Posttraumatic stress disorder     Decreased sex drive     Gastroesophageal reflux disease without esophagitis     ALLERGY   Bee venom; Latex; Percocet [oxycodone-acetaminophen]; Toradol; and Tramadol  MEDICATIONS                                                   "                                           Current Outpatient Prescriptions   Medication Sig     omeprazole (PRILOSEC) 20 MG CR capsule Take 1 capsule (20 mg) by mouth daily     albuterol (PROAIR HFA, PROVENTIL HFA, VENTOLIN HFA) 108 (90 BASE) MCG/ACT inhaler Inhale 2 puffs into the lungs every 6 hours as needed for shortness of breath / dyspnea or wheezing     QUEtiapine (SEROQUEL XR) 50 MG TB24 Take 2 tablets (100 mg) by mouth daily     QUEtiapine (SEROQUEL XR) 300 MG 24 hr tablet Take 2 tablets (600 mg) by mouth At Bedtime     No current facility-administered medications for this visit.          PAST MEDICATION TRIALS    Prozac (fluoxetine), Zoloft (sertraline), Paxil (paroxetine) and Cymbalta (duloxetine). Paxil sexual SEs and constipation. Mirtazapine ineffective for insomnia.   Elavil (amitriptyline).   Lithium Carbonate, Depakote and Depakot ER (valproate) and Neurontin. Topamax didn't help (gabapentin). Lithium \"that was like eating pennies and nickels\" but helped / effective. Didn't like the blood level draws with lithium. Doesn't think has been on tegretol or trileptal.   Seroquel (quetiapine).   no older antipsychotics.   Xanax (alprazolam). Doesn't think has been on Buspar   Ambien (zolpidem) and Lunesta (eszopiclone). Lunesta helped initial insomnia, NOT middle. Ambien made him violent and agitated.       VITALS   /70 (BP Location: Left arm, Patient Position: Sitting, Cuff Size: Adult Regular)  Pulse 104  Temp 98.7  F (37.1  C) (Tympanic)  Ht 6' 1\" (1.854 m)  Wt 220 lb (99.8 kg)  BMI 29.03 kg/m2     PHQ9                     [unfilled]  LABS                                                                                                                            ms (on Seroquel) March 2017    MENTAL STATUS EXAM                                                                                        Alert. Oriented to person, place, and date / time. Casually groomed, calm, " cooperative with good eye contact. No problems with speech or psychomotor behavior. Mood was described as tired and affect was congruent to speech content and full range. Thought process, including associations, was unremarkable and thought content was devoid of suicidal and homicidal ideation and psychotic thought. No hallucinations. Insight was good. Judgment was intact and adequate for safety. Fund of knowledge was intact. Pt demonstrates no obvious problems with attention, concentration, language, recent or remote memory although these were not formally tested.     ASSESSMENT                                                                                                      HISTORICAL:  Initial psych note 9/14/16        NOTES:    Jani Langford is a 37 yo male with history of bipolar disorder, PTSD and chem dep - on Seroquel total 700 mg and been on for awhile. Main issue is sleep : has not had luck with sleep meds so discussed I will certainly try but given he has not found past meds effective more unlikely we are going to find med that works. We tried Remeron with no luck. We tried Topamax and no luck. He saw Dr. Garcia  and started on Mirapex. Last visit we were going to try Doxepin but insurance wouldn't cover. We tried Belsomra and didn't ehlp.    Given some history today we agreed on trying Effexor today. We discussed potential exacerbate juana / hypomania but we feel beneifts outweigh risks especially since he is on Seroquel / mood stabilzer.      TREATMENT RISK STATEMENT:  The risks, benefits, alternatives and potential adverse effects have been explained and are understood by the pt.  The pt agrees to the treatment plan with the ability to do so.   The pt knows to call the clinic for any problems or access emergency care if needed.        DIAGNOSES                 (Use of Axes system will continue, even though absent from DSM 5)         Axis I   - Bipolar I disorder with history of psychotic  features  Axis II  - no dx  Axis III - no major medical issues  Axis IV-  Psychosocial Stressors include: mod  Axis V - Global Assessment of Functioning current: 45    PLAN                                                                                                                    1)  MEDICATIONS:         -- Continue Seroquel  mg am and 600 mg of XR HS. He d/c Belsomra. Start Effexor ER 75 mg daily 5 days then increase to 150 mg daily.    2)  THERAPY:  No Change    3)  LABS:  lipid panel and glucose, LFTs, BMP including fasting glucose today    4)  PT MONITOR [call for probs]:  Worsening symptoms, SI/HI, SEs from meds    5)  REFERRALS [CD, medical, other]: will see if sleep medicine has any further ideas..    6)  RTC: 1 month

## 2017-07-19 ASSESSMENT — PATIENT HEALTH QUESTIONNAIRE - PHQ9: SUM OF ALL RESPONSES TO PHQ QUESTIONS 1-9: 11

## 2017-07-19 ASSESSMENT — ANXIETY QUESTIONNAIRES: GAD7 TOTAL SCORE: 17

## 2017-08-22 ENCOUNTER — OFFICE VISIT (OUTPATIENT)
Dept: FAMILY MEDICINE | Facility: OTHER | Age: 37
End: 2017-08-22
Attending: FAMILY MEDICINE
Payer: COMMERCIAL

## 2017-08-22 VITALS
WEIGHT: 202 LBS | SYSTOLIC BLOOD PRESSURE: 100 MMHG | HEART RATE: 77 BPM | BODY MASS INDEX: 27.36 KG/M2 | RESPIRATION RATE: 16 BRPM | TEMPERATURE: 97.3 F | HEIGHT: 72 IN | OXYGEN SATURATION: 95 % | DIASTOLIC BLOOD PRESSURE: 64 MMHG

## 2017-08-22 DIAGNOSIS — Z72.0 TOBACCO ABUSE: ICD-10-CM

## 2017-08-22 DIAGNOSIS — R68.82 DECREASED SEX DRIVE: ICD-10-CM

## 2017-08-22 DIAGNOSIS — K21.9 GASTROESOPHAGEAL REFLUX DISEASE WITHOUT ESOPHAGITIS: ICD-10-CM

## 2017-08-22 DIAGNOSIS — E78.49 OTHER HYPERLIPIDEMIA: ICD-10-CM

## 2017-08-22 DIAGNOSIS — R06.00 DYSPNEA, UNSPECIFIED TYPE: ICD-10-CM

## 2017-08-22 DIAGNOSIS — F31.9 BIPOLAR DISORDER WITH PSYCHOTIC FEATURES (H): Primary | ICD-10-CM

## 2017-08-22 PROCEDURE — 99000 SPECIMEN HANDLING OFFICE-LAB: CPT | Mod: ZL | Performed by: FAMILY MEDICINE

## 2017-08-22 PROCEDURE — 99213 OFFICE O/P EST LOW 20 MIN: CPT

## 2017-08-22 PROCEDURE — 99214 OFFICE O/P EST MOD 30 MIN: CPT | Performed by: FAMILY MEDICINE

## 2017-08-22 PROCEDURE — 36415 COLL VENOUS BLD VENIPUNCTURE: CPT | Mod: ZL | Performed by: FAMILY MEDICINE

## 2017-08-22 PROCEDURE — 84403 ASSAY OF TOTAL TESTOSTERONE: CPT | Mod: ZL | Performed by: FAMILY MEDICINE

## 2017-08-22 RX ORDER — ALBUTEROL SULFATE 90 UG/1
2 AEROSOL, METERED RESPIRATORY (INHALATION) EVERY 6 HOURS PRN
Qty: 1 INHALER | Refills: 1 | Status: SHIPPED | OUTPATIENT
Start: 2017-08-22 | End: 2018-12-07

## 2017-08-22 ASSESSMENT — ANXIETY QUESTIONNAIRES
7. FEELING AFRAID AS IF SOMETHING AWFUL MIGHT HAPPEN: NOT AT ALL
6. BECOMING EASILY ANNOYED OR IRRITABLE: SEVERAL DAYS
IF YOU CHECKED OFF ANY PROBLEMS ON THIS QUESTIONNAIRE, HOW DIFFICULT HAVE THESE PROBLEMS MADE IT FOR YOU TO DO YOUR WORK, TAKE CARE OF THINGS AT HOME, OR GET ALONG WITH OTHER PEOPLE: VERY DIFFICULT
5. BEING SO RESTLESS THAT IT IS HARD TO SIT STILL: NOT AT ALL
2. NOT BEING ABLE TO STOP OR CONTROL WORRYING: NOT AT ALL
GAD7 TOTAL SCORE: 5
4. TROUBLE RELAXING: MORE THAN HALF THE DAYS
3. WORRYING TOO MUCH ABOUT DIFFERENT THINGS: SEVERAL DAYS
1. FEELING NERVOUS, ANXIOUS, OR ON EDGE: SEVERAL DAYS

## 2017-08-22 ASSESSMENT — PATIENT HEALTH QUESTIONNAIRE - PHQ9: SUM OF ALL RESPONSES TO PHQ QUESTIONS 1-9: 10

## 2017-08-22 NOTE — MR AVS SNAPSHOT
After Visit Summary   8/22/2017    Jani Langford    MRN: [id number]           Patient Information     Date Of Birth          1980        Visit Information        Provider Department      8/22/2017 8:45 AM Selwyn Lopez MD [facility]        Today's Diagnoses     Bipolar disorder with psychotic features (H)    -  1    Dyspnea, unspecified type        Other hyperlipidemia        Decreased sex drive        Gastroesophageal reflux disease without esophagitis        Tobacco abuse          Care Instructions    F/u with ongoing concerns.  November otherwise.            Follow-ups after your visit        Your next 10 appointments already scheduled     Aug 28, 2017  8:40 AM CDT   (Arrive by 8:25 AM)   Return Visit with Renee Vuong MD   [facility] ([facility] )    [address] [place] [address] [id number] [phone]            Nov 22, 2017  9:15 AM CST   (Arrive by 9:00 AM)   SHORT with Selwyn Lopez MD   [facility] ([facility] )    [address] [facility] [address]   [phone]              Who to contact     If you have questions or need follow up information about today's clinic visit or your schedule please contact [facility] directly at [phone].  Normal or non-critical lab and imaging results will be communicated to you by OpenDrivehart, letter or phone within 4 business days after the clinic has received the results. If you do not hear from us within 7 days, please contact the clinic through OpenDrivehart or phone. If you have a critical or abnormal lab result, we will notify you by phone as soon as possible.  Submit refill requests through Pathflow or call your pharmacy and they will forward the refill request to us. Please allow 3 business days for your refill to be completed.          Additional Information About Your Visit        OpenDriveharIntegral Vision Information     Pathflow lets you send  "messages to your doctor, view your test results, renew your prescriptions, schedule appointments and more. To sign up, go to [email or web address] . Click on \"Log in\" on the left side of the screen, which will take you to the Welcome page. Then click on \"Sign up Now\" on the right side of the page.     You will be asked to enter the access code listed below, as well as some personal information. Please follow the directions to create your username and password.     Your access code is: [email or web address]  Expires: 2017 11:22 AM     Your access code will  in 90 days. If you need help or a new code, please call your [facility] clinic or [phone].        Care EveryWhere ID     This is your Care EveryWhere ID. This could be used by other organizations to access your [facility] medical records  [id number]        Your Vitals Were     Pulse Temperature Respirations Height Pulse Oximetry BMI (Body Mass Index)    77 97.3  F (36.3  C) (Tympanic) 16 6' (1.829 m) 95% 27.4 kg/m2       Blood Pressure from Last 3 Encounters:   17 100/64   17 124/70   17 124/72    Weight from Last 3 Encounters:   17 202 lb (91.6 kg)   17 220 lb (99.8 kg)   17 220 lb (99.8 kg)              We Performed the Following     Testosterone, total     TOBACCO CESSATION - FOR HEALTH MAINTENANCE          Today's Medication Changes          These changes are accurate as of: 17 11:22 AM.  If you have any questions, ask your nurse or doctor.               Stop taking these medicines if you haven't already. Please contact your care team if you have questions.     venlafaxine 75 MG 24 hr capsule   Commonly known as:  EFFEXOR-XR   Stopped by:  Selwyn Lopez MD                Where to get your medicines      These medications were sent to [id number] - [place] - [address]  [address] [place] [address]     Phone:  [phone]     albuterol 108 (90 BASE) MCG/ACT Inhaler             "    Primary Care Provider Office Phone # Fax #    R Wilmer Mendez [phone]       [facility] [address] [place] [id number]        Equal Access to Services     MIKE SAHU : Hadtoya kalia lima adiliao Somirtaali, waaxda luqadaha, qaybta kaalmada adeegyada, oh rosen kenyettaafua fleming shani card. So [facility] [phone].    ATENCIÓN: Si habla español, tiene a davis disposición servicios gratuitos de asistencia lingüística. Llame al [phone].    We comply with applicable federal civil rights laws and [place] laws. We do not discriminate on the basis of race, color, national origin, age, disability sex, sexual orientation or gender identity.            Thank you!     Thank you for choosing [facility]  for your care. Our goal is always to provide you with excellent care. Hearing back from our patients is one way we can continue to improve our services. Please take a few minutes to complete the written survey that you may receive in the mail after your visit with us. Thank you!             Your Updated Medication List - Protect others around you: Learn how to safely use, store and throw away your medicines at www.disposemymeds.org.          This list is accurate as of: 8/22/17 11:22 AM.  Always use your most recent med list.                   Brand Name Dispense Instructions for use Diagnosis    albuterol 108 (90 BASE) MCG/ACT Inhaler    PROAIR HFA/PROVENTIL HFA/VENTOLIN HFA    1 Inhaler    Inhale 2 puffs into the lungs every 6 hours as needed for shortness of breath / dyspnea or wheezing    Dyspnea, unspecified type       omeprazole 20 MG CR capsule    priLOSEC    30 capsule    Take 1 capsule (20 mg) by mouth daily    Gastroesophageal reflux disease without esophagitis       * QUEtiapine 50 MG Tb24 24 hr tablet    SEROQUEL XR    60 each    Take 2 tablets (100 mg) by mouth daily    Bipolar I disorder, most recent episode (or current) manic (H)       * QUEtiapine 300 MG  24 hr tablet    SEROQUEL XR    60 tablet    Take 2 tablets (600 mg) by mouth At Bedtime    Bipolar I disorder, most recent episode (or current) manic (H)       * Notice:  This list has 2 medication(s) that are the same as other medications prescribed for you. Read the directions carefully, and ask your doctor or other care provider to review them with you.

## 2017-08-22 NOTE — NURSING NOTE
Chief Complaint   Patient presents with     Establish Care     was seeing Dr Wilmer Mendez     Breathing Problem     requesting refills of inhaler       Initial /64 (BP Location: Left arm, Patient Position: Sitting, Cuff Size: Adult Regular)  Pulse 77  Temp 97.3  F (36.3  C) (Tympanic)  Resp 16  Ht 6' (1.829 m)  Wt 202 lb (91.6 kg)  SpO2 95%  BMI 27.4 kg/m2 Estimated body mass index is 27.4 kg/(m^2) as calculated from the following:    Height as of this encounter: 6' (1.829 m).    Weight as of this encounter: 202 lb (91.6 kg).  Medication Reconciliation: complete     Mery Figueroa

## 2017-08-23 ASSESSMENT — ANXIETY QUESTIONNAIRES: GAD7 TOTAL SCORE: 5

## 2017-08-24 LAB — TESTOST SERPL-MCNC: 465 NG/DL (ref 240–950)

## 2017-08-28 ENCOUNTER — OFFICE VISIT (OUTPATIENT)
Dept: PSYCHIATRY | Facility: OTHER | Age: 37
End: 2017-08-28
Attending: PSYCHIATRY & NEUROLOGY
Payer: COMMERCIAL

## 2017-08-28 VITALS
HEIGHT: 72 IN | BODY MASS INDEX: 27.36 KG/M2 | HEART RATE: 90 BPM | DIASTOLIC BLOOD PRESSURE: 72 MMHG | SYSTOLIC BLOOD PRESSURE: 114 MMHG | WEIGHT: 202 LBS | TEMPERATURE: 97.5 F

## 2017-08-28 DIAGNOSIS — F43.10 PTSD (POST-TRAUMATIC STRESS DISORDER): Primary | ICD-10-CM

## 2017-08-28 PROCEDURE — 99214 OFFICE O/P EST MOD 30 MIN: CPT | Performed by: PSYCHIATRY & NEUROLOGY

## 2017-08-28 PROCEDURE — 99212 OFFICE O/P EST SF 10 MIN: CPT

## 2017-08-28 ASSESSMENT — ANXIETY QUESTIONNAIRES
GAD7 TOTAL SCORE: 11
7. FEELING AFRAID AS IF SOMETHING AWFUL MIGHT HAPPEN: SEVERAL DAYS
IF YOU CHECKED OFF ANY PROBLEMS ON THIS QUESTIONNAIRE, HOW DIFFICULT HAVE THESE PROBLEMS MADE IT FOR YOU TO DO YOUR WORK, TAKE CARE OF THINGS AT HOME, OR GET ALONG WITH OTHER PEOPLE: VERY DIFFICULT
1. FEELING NERVOUS, ANXIOUS, OR ON EDGE: SEVERAL DAYS
6. BECOMING EASILY ANNOYED OR IRRITABLE: SEVERAL DAYS
3. WORRYING TOO MUCH ABOUT DIFFERENT THINGS: MORE THAN HALF THE DAYS
5. BEING SO RESTLESS THAT IT IS HARD TO SIT STILL: SEVERAL DAYS
2. NOT BEING ABLE TO STOP OR CONTROL WORRYING: MORE THAN HALF THE DAYS

## 2017-08-28 ASSESSMENT — PATIENT HEALTH QUESTIONNAIRE - PHQ9
SUM OF ALL RESPONSES TO PHQ QUESTIONS 1-9: 10
5. POOR APPETITE OR OVEREATING: NEARLY EVERY DAY

## 2017-08-28 ASSESSMENT — PAIN SCALES - GENERAL: PAINLEVEL: NO PAIN (0)

## 2017-08-28 NOTE — NURSING NOTE
Chief Complaint   Patient presents with     RECHECK     Mental health.  Discuss medications, alternatives.  Did not start Effexor d/t side effects       Initial /72 (BP Location: Left arm, Patient Position: Sitting, Cuff Size: Adult Regular)  Pulse 90  Temp 97.5  F (36.4  C) (Tympanic)  Ht 6' (1.829 m)  Wt 202 lb (91.6 kg)  BMI 27.4 kg/m2 Estimated body mass index is 27.4 kg/(m^2) as calculated from the following:    Height as of this encounter: 6' (1.829 m).    Weight as of this encounter: 202 lb (91.6 kg).  Medication Reconciliation: complete     KIERAN GARCIA
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
## 2017-08-29 ASSESSMENT — ANXIETY QUESTIONNAIRES: GAD7 TOTAL SCORE: 11

## 2017-09-11 DIAGNOSIS — F31.10 BIPOLAR I DISORDER, MOST RECENT EPISODE (OR CURRENT) MANIC (H): ICD-10-CM

## 2017-09-12 RX ORDER — QUETIAPINE 300 MG/1
TABLET, FILM COATED, EXTENDED RELEASE ORAL
Qty: 60 TABLET | Refills: 0 | Status: SHIPPED | OUTPATIENT
Start: 2017-09-12 | End: 2017-10-13

## 2017-09-12 NOTE — TELEPHONE ENCOUNTER
QUEtiapine (SEROQUEL XR) 300 MG 24 hr tablet      Last Written Prescription Date: 9/14/16  Last Fill Quantity: 60, # refills: 0  Last Office Visit with G, P or [facility] prescribing provider: 5/15/17  Next 5 appointments (look out 90 days)     Sep 27, 2017  8:40 AM CDT   (Arrive by 8:25 AM)   Return Visit with Renee Vuong MD   [facility] [place] ([facility] [place] )    [address] [place] [address]  [place] [address]   [phone]            Nov 22, 2017  9:15 AM CST   (Arrive by 9:00 AM)   SHORT with Selwyn Lopez MD   [facility] ([facility] )    [address]  [facility] [address]   [phone]                   BP Readings from Last 3 Encounters:   08/28/17 114/72   08/22/17 100/64   07/18/17 124/70     Pulse Readings from Last 2 Encounters:   08/28/17 90   08/22/17 77     Lab Results   Component Value Date    GLC 97 05/08/2017     Lab Results   Component Value Date    WBC 10.1 11/25/2016     Lab Results   Component Value Date    RBC 4.97 11/25/2016     Lab Results   Component Value Date    HGB 16.0 11/25/2016     Lab Results   Component Value Date    HCT 45.7 11/25/2016     No components found for: MCT  Lab Results   Component Value Date    MCV 92 11/25/2016     Lab Results   Component Value Date    MCH 32.2 11/25/2016     Lab Results   Component Value Date    MCHC 35.0 11/25/2016     Lab Results   Component Value Date    RDW 12.2 11/25/2016     Lab Results   Component Value Date     11/25/2016     Lab Results   Component Value Date    CHOL 258 05/08/2017     Lab Results   Component Value Date    HDL 27 05/08/2017     Lab Results   Component Value Date    LDL  05/08/2017     Cannot estimate LDL when triglyceride exceeds 400 mg/dL     Lab Results   Component Value Date    TRIG 403 05/08/2017     No results found for: CHOLDANIELLELRATIO

## 2017-09-27 ENCOUNTER — OFFICE VISIT (OUTPATIENT)
Dept: PSYCHIATRY | Facility: OTHER | Age: 37
End: 2017-09-27
Attending: PSYCHIATRY & NEUROLOGY
Payer: COMMERCIAL

## 2017-09-27 VITALS
WEIGHT: 210 LBS | DIASTOLIC BLOOD PRESSURE: 72 MMHG | TEMPERATURE: 98.1 F | SYSTOLIC BLOOD PRESSURE: 122 MMHG | BODY MASS INDEX: 28.48 KG/M2 | HEART RATE: 102 BPM

## 2017-09-27 DIAGNOSIS — F31.30 BIPOLAR I DISORDER, MOST RECENT EPISODE DEPRESSED (H): Primary | ICD-10-CM

## 2017-09-27 PROCEDURE — 99214 OFFICE O/P EST MOD 30 MIN: CPT | Performed by: PSYCHIATRY & NEUROLOGY

## 2017-09-27 PROCEDURE — 99212 OFFICE O/P EST SF 10 MIN: CPT

## 2017-09-27 RX ORDER — CARBAMAZEPINE 200 MG/1
200 TABLET, EXTENDED RELEASE ORAL 2 TIMES DAILY
Qty: 60 TABLET | Refills: 3 | Status: SHIPPED | OUTPATIENT
Start: 2017-09-27 | End: 2017-10-27

## 2017-09-27 ASSESSMENT — ANXIETY QUESTIONNAIRES
2. NOT BEING ABLE TO STOP OR CONTROL WORRYING: SEVERAL DAYS
1. FEELING NERVOUS, ANXIOUS, OR ON EDGE: NEARLY EVERY DAY
6. BECOMING EASILY ANNOYED OR IRRITABLE: MORE THAN HALF THE DAYS
IF YOU CHECKED OFF ANY PROBLEMS ON THIS QUESTIONNAIRE, HOW DIFFICULT HAVE THESE PROBLEMS MADE IT FOR YOU TO DO YOUR WORK, TAKE CARE OF THINGS AT HOME, OR GET ALONG WITH OTHER PEOPLE: VERY DIFFICULT
5. BEING SO RESTLESS THAT IT IS HARD TO SIT STILL: SEVERAL DAYS
GAD7 TOTAL SCORE: 12
3. WORRYING TOO MUCH ABOUT DIFFERENT THINGS: MORE THAN HALF THE DAYS
7. FEELING AFRAID AS IF SOMETHING AWFUL MIGHT HAPPEN: SEVERAL DAYS

## 2017-09-27 ASSESSMENT — PATIENT HEALTH QUESTIONNAIRE - PHQ9
SUM OF ALL RESPONSES TO PHQ QUESTIONS 1-9: 12
5. POOR APPETITE OR OVEREATING: MORE THAN HALF THE DAYS

## 2017-09-27 ASSESSMENT — PAIN SCALES - GENERAL: PAINLEVEL: NO PAIN (0)

## 2017-09-27 NOTE — NURSING NOTE
Chief Complaint   Patient presents with     RECHECK     Mental health.       Initial /72 (BP Location: Right arm, Patient Position: Sitting, Cuff Size: Adult Regular)  Pulse 102  Temp 98.1  F (36.7  C) (Tympanic)  Wt 210 lb (95.3 kg)  BMI 28.48 kg/m2 Estimated body mass index is 28.48 kg/(m^2) as calculated from the following:    Height as of 8/28/17: 6' (1.829 m).    Weight as of this encounter: 210 lb (95.3 kg).  Medication Reconciliation: complete     KIERAN GARCIA
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
## 2017-09-27 NOTE — MR AVS SNAPSHOT
"              After Visit Summary   9/27/2017    Jani Langford    MRN: [id number]           Patient Information     Date Of Birth          1980        Visit Information        Provider Department      9/27/2017 8:40 AM Renee Vuong MD [facility]        Today's Diagnoses     Bipolar I disorder, most recent episode depressed (H)    -  1       Follow-ups after your visit        Your next 10 appointments already scheduled     Oct 27, 2017  8:20 AM CDT   (Arrive by 8:05 AM)   Return Visit with Renee Vuong MD   [facility] ([facility] )    [address] [place] [address] [id number] [phone]            Nov 22, 2017  9:15 AM CST   (Arrive by 9:00 AM)   SHORT with Selwyn Lopez MD   [facility] ([facility] )    [address] [facility] [address]   [phone]              Who to contact     If you have questions or need follow up information about today's clinic visit or your schedule please contact [facility] directly at [phone].  Normal or non-critical lab and imaging results will be communicated to you by MyChart, letter or phone within 4 business days after the clinic has received the results. If you do not hear from us within 7 days, please contact the clinic through MyChart or phone. If you have a critical or abnormal lab result, we will notify you by phone as soon as possible.  Submit refill requests through [facility] or call your pharmacy and they will forward the refill request to us. Please allow 3 business days for your refill to be completed.          Additional Information About Your Visit        MyChart Information     [facility] lets you send messages to your doctor, view your test results, renew your prescriptions, schedule appointments and more. To sign up, go to [email or web address] . Click on \"Log in\" on the left side of the screen, which will take you to the Welcome page. Then " "click on \"Sign up Now\" on the right side of the page.     You will be asked to enter the access code listed below, as well as some personal information. Please follow the directions to create your username and password.     Your access code is: [email or web address]  Expires: 2017 11:22 AM     Your access code will  in 90 days. If you need help or a new code, please call your [facility] or [phone].        Care EveryWhere ID     This is your Care EveryWhere ID. This could be used by other organizations to access your [facility] medical records  [id number]        Your Vitals Were     Pulse Temperature BMI (Body Mass Index)             102 98.1  F (36.7  C) (Tympanic) 28.48 kg/m2          Blood Pressure from Last 3 Encounters:   17 122/72   17 114/72   17 100/64    Weight from Last 3 Encounters:   17 210 lb (95.3 kg)   17 202 lb (91.6 kg)   17 202 lb (91.6 kg)              Today, you had the following     No orders found for display         Today's Medication Changes          These changes are accurate as of: 17  9:21 AM.  If you have any questions, ask your nurse or doctor.               Start taking these medicines.        Dose/Directions    carBAMazepine 200 MG 12 hr tablet   Commonly known as:  TEGretol XR   Used for:  Bipolar I disorder, most recent episode depressed (H)   Started by:  Renee Vuong MD        Dose:  200 mg   Take 1 tablet (200 mg) by mouth 2 times daily   Quantity:  60 tablet   Refills:  3            Where to get your medicines      These medications were sent to [id number] - [place], [place] - [address]  [address] [place] [address]     Phone:  [phone]     carBAMazepine 200 MG 12 hr tablet                Primary Care Provider Office Phone # Fax #    R Wilmer Mendez [phone]       [facility] [place] [address] [place] [address]  [place] [address]        Equal Access to Services     MIKE SAHU AH: " Hadii aad ku siritoryo Somirtaali, waaxda luqadaha, qaybta kaalmada emmett, oh acrd. So [facility] [phone].    ATENCIÓN: Si carlton mcgill, tiene a davis disposición servicios gratuitos de asistencia lingüística. Llame al [phone].    We comply with applicable federal civil rights laws and [place] laws. We do not discriminate on the basis of race, color, national origin, age, disability sex, sexual orientation or gender identity.            Thank you!     Thank you for choosing [facility]  for your care. Our goal is always to provide you with excellent care. Hearing back from our patients is one way we can continue to improve our services. Please take a few minutes to complete the written survey that you may receive in the mail after your visit with us. Thank you!             Your Updated Medication List - Protect others around you: Learn how to safely use, store and throw away your medicines at www.disposemymeds.org.          This list is accurate as of: 9/27/17  9:21 AM.  Always use your most recent med list.                   Brand Name Dispense Instructions for use Diagnosis    albuterol 108 (90 BASE) MCG/ACT Inhaler    PROAIR HFA/PROVENTIL HFA/VENTOLIN HFA    1 Inhaler    Inhale 2 puffs into the lungs every 6 hours as needed for shortness of breath / dyspnea or wheezing    Dyspnea, unspecified type       carBAMazepine 200 MG 12 hr tablet    TEGretol XR    60 tablet    Take 1 tablet (200 mg) by mouth 2 times daily    Bipolar I disorder, most recent episode depressed (H)       omeprazole 20 MG CR capsule    priLOSEC    30 capsule    Take 1 capsule (20 mg) by mouth daily    Gastroesophageal reflux disease without esophagitis       * QUEtiapine 50 MG Tb24 24 hr tablet    SEROQUEL XR    60 each    Take 2 tablets (100 mg) by mouth daily    Bipolar I disorder, most recent episode (or current) manic (H)       * QUEtiapine 300 MG 24 hr tablet    SEROquel XR    60 tablet     TAKE 2 TABLETS BY MOUTH AT BEDTIME    Bipolar I disorder, most recent episode (or current) manic (H)       * Notice:  This list has 2 medication(s) that are the same as other medications prescribed for you. Read the directions carefully, and ask your doctor or other care provider to review them with you.

## 2017-09-28 ASSESSMENT — ANXIETY QUESTIONNAIRES: GAD7 TOTAL SCORE: 12

## 2017-10-13 ENCOUNTER — TELEPHONE (OUTPATIENT)
Dept: PSYCHIATRY | Facility: OTHER | Age: 37
End: 2017-10-13

## 2017-10-13 DIAGNOSIS — F31.10 BIPOLAR I DISORDER, MOST RECENT EPISODE (OR CURRENT) MANIC (H): ICD-10-CM

## 2017-10-13 RX ORDER — QUETIAPINE 300 MG/1
TABLET, FILM COATED, EXTENDED RELEASE ORAL
Qty: 60 TABLET | Refills: 11 | Status: SHIPPED | OUTPATIENT
Start: 2017-10-13 | End: 2018-01-08

## 2017-10-13 NOTE — TELEPHONE ENCOUNTER
Refill request for Seroquel 300 mg.  Take 2 pills at bedtime.  Last visit on 9-27-17.  Last fill on 9-12-17 # 60.  Next f/u on 10-27-17.  Explained to patient to call pharmacy for next refill a few days before he is out of medication.  Patient has enough left for 2 nights.  Uses [facility] in [place].  Thank you, please advise.

## 2017-10-16 DIAGNOSIS — F31.10 BIPOLAR I DISORDER, MOST RECENT EPISODE (OR CURRENT) MANIC (H): ICD-10-CM

## 2017-10-17 ENCOUNTER — TELEPHONE (OUTPATIENT)
Dept: PSYCHIATRY | Facility: OTHER | Age: 37
End: 2017-10-17

## 2017-10-17 NOTE — TELEPHONE ENCOUNTER
Patient is taking Tegretol since last visit of 9-27 and has developed a rash.  LM for patient to stop taking and will let Dr. Vuong know.  Call back # given.

## 2017-10-18 RX ORDER — QUETIAPINE FUMARATE 50 MG/1
TABLET, EXTENDED RELEASE ORAL
Qty: 60 TABLET | Refills: 11 | Status: SHIPPED | OUTPATIENT
Start: 2017-10-18 | End: 2017-11-22 | Stop reason: DRUGHIGH

## 2017-10-18 NOTE — TELEPHONE ENCOUNTER
Seroquel XR  Last visit: 9/27/17  Last refill: 9/14/16 #60 R-11    Pt due for CBC    Next 5 appointments (look out 90 days)     Oct 27, 2017  8:20 AM CDT   (Arrive by 8:05 AM)   Return Visit with Renee Vuong MD   [facility] [place] ([facility] [place] )    [address]  [place] [address]   [phone]            Nov 22, 2017  9:15 AM CST   (Arrive by 9:00 AM)   SHORT with Selwyn Lopez MD   [facility] ([facility] [place] )    [address]  [facility] [address]   [phone]                   BP Readings from Last 3 Encounters:   09/27/17 122/72   08/28/17 114/72   08/22/17 100/64     Pulse Readings from Last 2 Encounters:   09/27/17 102   08/28/17 90     Lab Results   Component Value Date    GLC 97 05/08/2017     Lab Results   Component Value Date    WBC 10.1 11/25/2016     Lab Results   Component Value Date    RBC 4.97 11/25/2016     Lab Results   Component Value Date    HGB 16.0 11/25/2016     Lab Results   Component Value Date    HCT 45.7 11/25/2016     No components found for: MCT  Lab Results   Component Value Date    MCV 92 11/25/2016     Lab Results   Component Value Date    MCH 32.2 11/25/2016     Lab Results   Component Value Date    MCHC 35.0 11/25/2016     Lab Results   Component Value Date    RDW 12.2 11/25/2016     Lab Results   Component Value Date     11/25/2016     Lab Results   Component Value Date    CHOL 258 05/08/2017     Lab Results   Component Value Date    HDL 27 05/08/2017     Lab Results   Component Value Date    LDL  05/08/2017     Cannot estimate LDL when triglyceride exceeds 400 mg/dL     Lab Results   Component Value Date    TRIG 403 05/08/2017     No results found for: CHOLHDLRATIO

## 2017-10-27 ENCOUNTER — OFFICE VISIT (OUTPATIENT)
Dept: PSYCHIATRY | Facility: OTHER | Age: 37
End: 2017-10-27
Attending: PSYCHIATRY & NEUROLOGY
Payer: COMMERCIAL

## 2017-10-27 VITALS
HEART RATE: 101 BPM | WEIGHT: 215 LBS | BODY MASS INDEX: 29.16 KG/M2 | SYSTOLIC BLOOD PRESSURE: 108 MMHG | DIASTOLIC BLOOD PRESSURE: 68 MMHG | TEMPERATURE: 98.7 F

## 2017-10-27 DIAGNOSIS — F31.9 BIPOLAR I DISORDER (H): Primary | ICD-10-CM

## 2017-10-27 PROCEDURE — 99214 OFFICE O/P EST MOD 30 MIN: CPT | Performed by: PSYCHIATRY & NEUROLOGY

## 2017-10-27 PROCEDURE — 99212 OFFICE O/P EST SF 10 MIN: CPT

## 2017-10-27 RX ORDER — ASENAPINE 5 MG/1
5 TABLET SUBLINGUAL EVERY EVENING
Qty: 60 TABLET | Refills: 1 | Status: SHIPPED | OUTPATIENT
Start: 2017-10-27 | End: 2017-11-10

## 2017-10-27 ASSESSMENT — ANXIETY QUESTIONNAIRES
6. BECOMING EASILY ANNOYED OR IRRITABLE: SEVERAL DAYS
5. BEING SO RESTLESS THAT IT IS HARD TO SIT STILL: NOT AT ALL
1. FEELING NERVOUS, ANXIOUS, OR ON EDGE: MORE THAN HALF THE DAYS
GAD7 TOTAL SCORE: 9
7. FEELING AFRAID AS IF SOMETHING AWFUL MIGHT HAPPEN: SEVERAL DAYS
IF YOU CHECKED OFF ANY PROBLEMS ON THIS QUESTIONNAIRE, HOW DIFFICULT HAVE THESE PROBLEMS MADE IT FOR YOU TO DO YOUR WORK, TAKE CARE OF THINGS AT HOME, OR GET ALONG WITH OTHER PEOPLE: VERY DIFFICULT
3. WORRYING TOO MUCH ABOUT DIFFERENT THINGS: NEARLY EVERY DAY
2. NOT BEING ABLE TO STOP OR CONTROL WORRYING: NOT AT ALL

## 2017-10-27 ASSESSMENT — PAIN SCALES - GENERAL: PAINLEVEL: NO PAIN (0)

## 2017-10-27 ASSESSMENT — PATIENT HEALTH QUESTIONNAIRE - PHQ9
SUM OF ALL RESPONSES TO PHQ QUESTIONS 1-9: 9
5. POOR APPETITE OR OVEREATING: MORE THAN HALF THE DAYS

## 2017-10-27 NOTE — PATIENT INSTRUCTIONS
Seroquel is currently 100 mg am and 600 mg evening. Decrease the evening from 600 to 500 mg for 3 days; then down to 400 mg for 3 days. Once you get down to 400 mg evening we will have you start the new one Saphris: Saphris start at 5 mg every evening.     So you will be on both for a while: 400 mg evening of Seroquel and 5 mg of Saphris.

## 2017-10-27 NOTE — PROGRESS NOTES
PSYCHIATRY CLINIC PROGRESS NOTE   20 minute medication management, more than 50% of time spent counseling patient on medications, medication side effects, symptom history and management   SUBJECTIVE / INTERIM HISTORY                                                                        Social- with ISABEL Sandhu and her kids Children- Phoebe's kids are 5, 7 , and 10 yo  Last visit:  we are thinking about Tegretol, Abilify, Latuda: going to have him look up on internet including on forums.     - Tegretol: rash so d/c  - has read up bit as we discussed the above medications and is concerned that if goes off Seroquel symptoms including insomnia will get worse. Also concern of potential SEs of meds (weight, lipids)  - we reviewed triglycerides / labs from May and I noted very likely Seroquel contributing to the elevated lipids. Jani noted he is trying to eat better and for example reducing portion sizes, not eating big heavy brunches like he used to.   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  - still not sleeping much: 4 hours, 6 would be good if he could. Wakes up 5-6 times per night and then if doesn't sleep well night sleeps in am  -  Thinks Seroquel helps somewhat and has been on higher dose in past of 800-900.  - aggressive in past while manic.  SUBSTANCE USE- smokes 1/2 PPD since age 15 yo.  past use  Meth will be year in about [place] clean. , MJ. tx [place] and [facility]. EtOH now rare.    SYMPTOMS-  + middle and early insomnia.  nightmares, flashbacks, detached, angry outbursts, self-destructive, startle response, hypervigilance and fear Sleep now: about 2-3 hours then up for awhile, then another hour. His goal would be 6 continuous.  MEDICAL ROS-  SOB: usually at night. But sometimes during day as well not even with exertion.  increased appetite / weight  MEDICAL / SURGICAL HISTORY                   Patient Active Problem List   Diagnosis     Bipolar disorder with psychotic features  "(H)     Insomnia     History of mental disorder     Attention deficit disorder     Posttraumatic stress disorder     Decreased sex drive     Gastroesophageal reflux disease without esophagitis     ALLERGY   Bee venom; Latex; Percocet [oxycodone-acetaminophen]; Toradol; Tramadol; and Tegretol [carbamazepine]  MEDICATIONS                                                                                             Current Outpatient Prescriptions   Medication Sig     QUEtiapine (SEROQUEL XR) 50 MG TB24 24 hr tablet TAKE 2 TABLETS BY MOUTH DAILY     QUEtiapine (SEROQUEL XR) 300 MG 24 hr tablet TAKE 2 TABLETS BY MOUTH AT BEDTIME     albuterol (PROAIR HFA/PROVENTIL HFA/VENTOLIN HFA) 108 (90 BASE) MCG/ACT Inhaler Inhale 2 puffs into the lungs every 6 hours as needed for shortness of breath / dyspnea or wheezing     omeprazole (PRILOSEC) 20 MG CR capsule Take 1 capsule (20 mg) by mouth daily     No current facility-administered medications for this visit.          PAST MEDICATION TRIALS    Prozac (fluoxetine), Zoloft (sertraline), Paxil (paroxetine) and Cymbalta (duloxetine). Paxil sexual SEs and constipation. Mirtazapine ineffective for insomnia.   Elavil (amitriptyline).   Lithium Carbonate, Depakote and Depakot ER (valproate) and Neurontin. Topamax didn't help (gabapentin). Lithium \"that was like eating pennies and nickels\" but helped / effective. Didn't like the blood level draws with lithium. Doesn't think has been on tegretol or trileptal.   Seroquel (quetiapine).   no older antipsychotics.   Xanax (alprazolam). Doesn't think has been on Buspar   Ambien (zolpidem) and Lunesta (eszopiclone). Lunesta helped initial insomnia, NOT middle. Ambien made him violent and agitated.       VITALS   /68 (BP Location: Left arm, Patient Position: Sitting, Cuff Size: Adult Large)  Pulse 101  Temp 98.7  F (37.1  C) (Tympanic)  Wt 215 lb (97.5 kg)  BMI 29.16 kg/m2     PHQ9                     [unfilled]  LABS             "                                                                                                               Recent Labs   Lab Test  05/08/17   0919   CHOL  258*   HDL  27*   LDL  Cannot estimate LDL when triglyceride exceeds 400 mg/dL   TRIG  403*     Fasting glucose 97 5/8/17     ms (on Seroquel) March 2017    MENTAL STATUS EXAM                                                                                        Alert. Oriented to person, place, and date / time. Casually groomed, calm, cooperative with good eye contact. No problems with speech or psychomotor behavior. Mood was described as tired and affect was congruent to speech content and full range. Thought process, including associations, was unremarkable and thought content was devoid of suicidal and homicidal ideation and psychotic thought. No hallucinations. Insight was good. Judgment was intact and adequate for safety. Fund of knowledge was intact. Pt demonstrates no obvious problems with attention, concentration, language, recent or remote memory although these were not formally tested.     ASSESSMENT                                                                                                      HISTORICAL:  Initial psych note 9/14/16        NOTES:  Depakote in past didn't like it. Lithium in past metallic past but did work: doesn't want to go on it again.  Jani Langford is a 38 yo male with history of bipolar disorder, PTSD and chem dep - on Seroquel total 700 mg and been on for awhile. Main issue is sleep : has not had luck with sleep meds so discussed I will certainly try but given he has not found past meds effective more unlikely we are going to find med that works. We tried Remeron with no luck. We tried Topamax and no luck. He saw Dr. Garcia  and started on Mirapex. Last visit we were going to try Doxepin but insurance wouldn't cover. We tried Belsomra and didn't ehlp.    We had discussion today again  re: Seroquel -- he has  "reasonable concerns with increased risk lipids, glucose and thus potential increased risk cardiovascular risk..There are alternatives however I do have concern his sx could worsen and so does he and his SO Phoebe worry aobut this. We decided on Tegretol and he ended up with a rash. We reviewed other ideas for mood stabilizer and given sleep / insomnia an issue I would favor one he could take evening with hope of it being sedating / helping him sleep. We agreed on Saphris and we devised a start of a cross-taper. Going to have him check in with me ocuple weeks and we will touch base.    OF NOTE\" EKG from MARCH '17 with normal QTC we reviewed this today as combo of Saphris and Seroquel all the more could prolong QTc. We will likely check in on EKG in month or so make sure still is normal.      TREATMENT RISK STATEMENT:  The risks, benefits, alternatives and potential adverse effects have been explained and are understood by the pt.  The pt agrees to the treatment plan with the ability to do so.   The pt knows to call the clinic for any problems or access emergency care if needed.        DIAGNOSES                 (Use of Axes system will continue, even though absent from DSM 5)         Axis I   - Bipolar I disorder with history of psychotic features               PTSD   Axis II  - no dx  Axis III - no major medical issues  Axis IV-  Psychosocial Stressors include: mod  Axis V - Global Assessment of Functioning current: 45    PLAN                                                                                                                    1)  MEDICATIONS:         -- Seroquel is currently 100 mg am and 600 mg evening. Decrease the evening from 600 to 500 mg for 3 days; then down to 400 mg for 3 days. Once you get down to 400 mg evening we will have you start the new one Saphris: Saphris start at 5 mg every evening.     So you will be on both for a while: 400 mg evening of Seroquel and 5 mg of Saphris. EKG March " '17    2)  THERAPY:  No Change    3)  LABS:  lipid panel and glucose, LFTs, BMP including fasting glucose were done in May    4)  PT MONITOR [call for probs]:  Worsening symptoms, SI/HI, SEs from meds    5)  REFERRALS [CD, medical, other]: none    6)  RTC: 3 weeks

## 2017-10-27 NOTE — MR AVS SNAPSHOT
After Visit Summary   10/27/2017    Jani Langford    MRN: [id number]           Patient Information     Date Of Birth          1980        Visit Information        Provider Department      10/27/2017 8:20 AM Renee Vuong MD [facility]        Today's Diagnoses     Bipolar I disorder (H)    -  1      Care Instructions    Seroquel is currently 100 mg am and 600 mg evening. Decrease the evening from 600 to 500 mg for 3 days; then down to 400 mg for 3 days. Once you get down to 400 mg evening we will have you start the new one Saphris: Saphris start at 5 mg every evening.     So you will be on both for a while: 400 mg evening of Seroquel and 5 mg of Saphris.           Follow-ups after your visit        Your next 10 appointments already scheduled     Nov 22, 2017  9:15 AM CST   (Arrive by 9:00 AM)   SHORT with Selwyn Lopez MD   [facility] ([facility] )    [address] [place] [address]   [phone]              Who to contact     If you have questions or need follow up information about today's clinic visit or your schedule please contact [facility] directly at [phone].  Normal or non-critical lab and imaging results will be communicated to you by MyChart, letter or phone within 4 business days after the clinic has received the results. If you do not hear from us within 7 days, please contact the clinic through MyChart or phone. If you have a critical or abnormal lab result, we will notify you by phone as soon as possible.  Submit refill requests through See[employer] or call your pharmacy and they will forward the refill request to us. Please allow 3 business days for your refill to be completed.          Additional Information About Your Visit        [employer] Information     SeeClickFix lets you send messages to your doctor, view your test results, renew your prescriptions, schedule appointments and more. To sign up, go to  "[email or web address] . Click on \"Log in\" on the left side of the screen, which will take you to the Welcome page. Then click on \"Sign up Now\" on the right side of the page.     You will be asked to enter the access code listed below, as well as some personal information. Please follow the directions to create your username and password.     Your access code is: [email or web address]  Expires: 2017 11:22 AM     Your access code will  in 90 days. If you need help or a new code, please call your [facility] clinic or [phone].        Care EveryWhere ID     This is your Care EveryWhere ID. This could be used by other organizations to access your [facility] medical records  [id number]        Your Vitals Were     Pulse Temperature BMI (Body Mass Index)             101 98.7  F (37.1  C) (Tympanic) 29.16 kg/m2          Blood Pressure from Last 3 Encounters:   10/27/17 108/68   17 122/72   17 114/72    Weight from Last 3 Encounters:   10/27/17 215 lb (97.5 kg)   17 210 lb (95.3 kg)   17 202 lb (91.6 kg)              Today, you had the following     No orders found for display         Today's Medication Changes          These changes are accurate as of: 10/27/17  9:04 AM.  If you have any questions, ask your nurse or doctor.               Start taking these medicines.        Dose/Directions    asenapine 5 MG Subl sublingual tablet   Commonly known as:  SAPHRIS   Used for:  Bipolar I disorder (H)   Started by:  Renee Vuong MD        Dose:  5 mg   Place 1 tablet (5 mg) under the tongue every evening   Quantity:  60 tablet   Refills:  1            Where to get your medicines      These medications were sent to [id number] - [place] - [address]  [address] [address]     Phone:  [phone]     asenapine 5 MG Subl sublingual tablet                Primary Care Provider Office Phone # Fax #    R Wilmer Mendez [phone]       [facility] [address] [place] [address]        Equal Access to Services     MIKE ADELINA : Hadii kalia ku hadtoryo Somirtaali, waaxda luqadaha, qaybta kaalmada dulcemohamudregulo, oh up parvezjennyfer ariasronaldsacha card. So [facility] [phone].    ATENCIÓN: Si habla español, tiene a davis disposición servicios gratuitos de asistencia lingüística. Llame al [phone].    We comply with applicable federal civil rights laws and [place] laws. We do not discriminate on the basis of race, color, national origin, age, disability, sex, sexual orientation, or gender identity.            Thank you!     Thank you for choosing [facility]  for your care. Our goal is always to provide you with excellent care. Hearing back from our patients is one way we can continue to improve our services. Please take a few minutes to complete the written survey that you may receive in the mail after your visit with us. Thank you!             Your Updated Medication List - Protect others around you: Learn how to safely use, store and throw away your medicines at www.disposemymeds.org.          This list is accurate as of: 10/27/17  9:04 AM.  Always use your most recent med list.                   Brand Name Dispense Instructions for use Diagnosis    albuterol 108 (90 BASE) MCG/ACT Inhaler    PROAIR HFA/PROVENTIL HFA/VENTOLIN HFA    1 Inhaler    Inhale 2 puffs into the lungs every 6 hours as needed for shortness of breath / dyspnea or wheezing    Dyspnea, unspecified type       asenapine 5 MG Subl sublingual tablet    SAPHRIS    60 tablet    Place 1 tablet (5 mg) under the tongue every evening    Bipolar I disorder (H)       omeprazole 20 MG CR capsule    priLOSEC    30 capsule    Take 1 capsule (20 mg) by mouth daily    Gastroesophageal reflux disease without esophagitis       * QUEtiapine 300 MG 24 hr tablet    SEROquel XR    60 tablet    TAKE 2 TABLETS BY MOUTH AT BEDTIME    Bipolar I disorder, most recent episode (or current)  manic (H)       * QUEtiapine 50 MG Tb24 24 hr tablet    SEROquel XR    60 tablet    TAKE 2 TABLETS BY MOUTH DAILY    Bipolar I disorder, most recent episode (or current) manic (H)       * Notice:  This list has 2 medication(s) that are the same as other medications prescribed for you. Read the directions carefully, and ask your doctor or other care provider to review them with you.

## 2017-10-27 NOTE — NURSING NOTE
Chief Complaint   Patient presents with     RECHECK     Mental health.  Patient stopped Tegretol d/t a rash.  Also trying to get off Seroquel.        Initial /68 (BP Location: Left arm, Patient Position: Sitting, Cuff Size: Adult Large)  Pulse 101  Temp 98.7  F (37.1  C) (Tympanic)  Wt 215 lb (97.5 kg)  BMI 29.16 kg/m2 Estimated body mass index is 29.16 kg/(m^2) as calculated from the following:    Height as of 8/28/17: 6' (1.829 m).    Weight as of this encounter: 215 lb (97.5 kg).  Medication Reconciliation: complete     KIERAN GARCIA

## 2017-10-28 ASSESSMENT — ANXIETY QUESTIONNAIRES: GAD7 TOTAL SCORE: 9

## 2017-11-02 ENCOUNTER — TELEPHONE (OUTPATIENT)
Dept: PSYCHIATRY | Facility: OTHER | Age: 37
End: 2017-11-02

## 2017-11-02 NOTE — TELEPHONE ENCOUNTER
Received PA form from [facility] for the medication Saphris.  Will complete, fax and wait for determination

## 2017-11-10 ENCOUNTER — OFFICE VISIT (OUTPATIENT)
Dept: PSYCHIATRY | Facility: OTHER | Age: 37
End: 2017-11-10
Attending: PSYCHIATRY & NEUROLOGY
Payer: COMMERCIAL

## 2017-11-10 ENCOUNTER — TELEPHONE (OUTPATIENT)
Dept: PSYCHIATRY | Facility: OTHER | Age: 37
End: 2017-11-10

## 2017-11-10 VITALS
BODY MASS INDEX: 29.16 KG/M2 | SYSTOLIC BLOOD PRESSURE: 114 MMHG | HEART RATE: 79 BPM | DIASTOLIC BLOOD PRESSURE: 70 MMHG | TEMPERATURE: 98 F | WEIGHT: 215 LBS

## 2017-11-10 DIAGNOSIS — F31.9 BIPOLAR I DISORDER (H): Primary | ICD-10-CM

## 2017-11-10 PROCEDURE — 99212 OFFICE O/P EST SF 10 MIN: CPT

## 2017-11-10 PROCEDURE — 99214 OFFICE O/P EST MOD 30 MIN: CPT | Performed by: PSYCHIATRY & NEUROLOGY

## 2017-11-10 RX ORDER — ARIPIPRAZOLE 5 MG/1
5 TABLET ORAL DAILY
Qty: 30 TABLET | Refills: 3 | Status: SHIPPED | OUTPATIENT
Start: 2017-11-10 | End: 2017-12-18

## 2017-11-10 ASSESSMENT — ANXIETY QUESTIONNAIRES
1. FEELING NERVOUS, ANXIOUS, OR ON EDGE: MORE THAN HALF THE DAYS
7. FEELING AFRAID AS IF SOMETHING AWFUL MIGHT HAPPEN: NOT AT ALL
IF YOU CHECKED OFF ANY PROBLEMS ON THIS QUESTIONNAIRE, HOW DIFFICULT HAVE THESE PROBLEMS MADE IT FOR YOU TO DO YOUR WORK, TAKE CARE OF THINGS AT HOME, OR GET ALONG WITH OTHER PEOPLE: VERY DIFFICULT
GAD7 TOTAL SCORE: 10
5. BEING SO RESTLESS THAT IT IS HARD TO SIT STILL: MORE THAN HALF THE DAYS
6. BECOMING EASILY ANNOYED OR IRRITABLE: SEVERAL DAYS
2. NOT BEING ABLE TO STOP OR CONTROL WORRYING: SEVERAL DAYS
3. WORRYING TOO MUCH ABOUT DIFFERENT THINGS: SEVERAL DAYS

## 2017-11-10 ASSESSMENT — PATIENT HEALTH QUESTIONNAIRE - PHQ9
SUM OF ALL RESPONSES TO PHQ QUESTIONS 1-9: 10
5. POOR APPETITE OR OVEREATING: NEARLY EVERY DAY

## 2017-11-10 ASSESSMENT — PAIN SCALES - GENERAL: PAINLEVEL: NO PAIN (0)

## 2017-11-10 NOTE — TELEPHONE ENCOUNTER
Received PA denial notice for the medication Saphris from [facility].  [facility] non-formulary criteria require documentation that a member has tried two formulary alternatives, for example:  Aripiprazole ODT, Olanzapine ODT, before proceeding to the requested drug.  Thank you

## 2017-11-10 NOTE — PATIENT INSTRUCTIONS
Seroquel is currently 100 mg am and 600 mg evening. Decrease the evening from 600 to 500 mg for 3 days; then down to 400 mg for 3 days. Once you get down to 400 mg evening we will have you start the new one Abilify (aripiprazole): Abilify start at 5 mg daily.

## 2017-11-10 NOTE — NURSING NOTE
Chief Complaint   Patient presents with     RECHECK     Mental health.  Discuss medications.  Saphris not covered by insurance       Initial /70 (BP Location: Right arm, Patient Position: Sitting, Cuff Size: Adult Regular)  Pulse 79  Temp 98  F (36.7  C) (Tympanic)  Wt 215 lb (97.5 kg)  BMI 29.16 kg/m2 Estimated body mass index is 29.16 kg/(m^2) as calculated from the following:    Height as of 8/28/17: 6' (1.829 m).    Weight as of this encounter: 215 lb (97.5 kg).  Medication Reconciliation: complete     KIERAN GARCIA

## 2017-11-10 NOTE — MR AVS SNAPSHOT
"              After Visit Summary   11/10/2017    Jani Langford    MRN: [id number]           Patient Information     Date Of Birth          1980        Visit Information        Provider Department      11/10/2017 11:40 AM Renee Vuong MD [facility]        Today's Diagnoses     Bipolar I disorder (H)    -  1      Care Instructions     Seroquel is currently 100 mg am and 600 mg evening. Decrease the evening from 600 to 500 mg for 3 days; then down to 400 mg for 3 days. Once you get down to 400 mg evening we will have you start the new one Abilify (aripiprazole): Abilify start at 5 mg daily.            Follow-ups after your visit        Your next 10 appointments already scheduled     Nov 22, 2017  9:15 AM CST   (Arrive by 9:00 AM)   SHORT with Selwyn Lopez MD   [facility] ([facility] )    [address] [facility] [address]   [phone]              Who to contact     If you have questions or need follow up information about today's clinic visit or your schedule please contact [facility] directly at [phone].  Normal or non-critical lab and imaging results will be communicated to you by MyChart, letter or phone within 4 business days after the clinic has received the results. If you do not hear from us within 7 days, please contact the clinic through MyChart or phone. If you have a critical or abnormal lab result, we will notify you by phone as soon as possible.  Submit refill requests through [employer]Hub or call your pharmacy and they will forward the refill request to us. Please allow 3 business days for your refill to be completed.          Additional Information About Your Visit        MyChart Information     WalkHub lets you send messages to your doctor, view your test results, renew your prescriptions, schedule appointments and more. To sign up, go to [email or web address] . Click on \"Log in\" on the left side of the screen, " "which will take you to the Welcome page. Then click on \"Sign up Now\" on the right side of the page.     You will be asked to enter the access code listed below, as well as some personal information. Please follow the directions to create your username and password.     Your access code is: [email or web address]  Expires: 2017 10:22 AM     Your access code will  in 90 days. If you need help or a new code, please call your [facility] or [phone].        Care EveryWhere ID     This is your Care EveryWhere ID. This could be used by other organizations to access your [facility] medical records  [id number]        Your Vitals Were     Pulse Temperature BMI (Body Mass Index)             79 98  F (36.7  C) (Tympanic) 29.16 kg/m2          Blood Pressure from Last 3 Encounters:   11/10/17 114/70   10/27/17 108/68   17 122/72    Weight from Last 3 Encounters:   11/10/17 215 lb (97.5 kg)   10/27/17 215 lb (97.5 kg)   17 210 lb (95.3 kg)              Today, you had the following     No orders found for display         Today's Medication Changes          These changes are accurate as of: 11/10/17 12:20 PM.  If you have any questions, ask your nurse or doctor.               Start taking these medicines.        Dose/Directions    ARIPiprazole 5 MG tablet   Commonly known as:  ABILIFY   Used for:  Bipolar I disorder (H)   Started by:  Renee Vuong MD        Dose:  5 mg   Take 1 tablet (5 mg) by mouth daily   Quantity:  30 tablet   Refills:  3         Stop taking these medicines if you haven't already. Please contact your care team if you have questions.     asenapine 5 MG Subl sublingual tablet   Commonly known as:  SAPHRIS   Stopped by:  Renee Vuong MD                Where to get your medicines      Call your pharmacy to confirm that your medication is ready for pickup. It may take up to 24 hours for them to receive the prescription. If the prescription is not ready within 3 business days, please " contact your clinic or your provider.     We will let you know when these medications are ready. If you don't hear back within 3 business days, please contact us.     ARIPiprazole 5 MG tablet                Primary Care Provider Office Phone # Fax #    R Wilmer Mendez [phone]       [facility] [place] [address] [address]        Equal Access to Services     [facility]ERT[facility] : Hadii aad ku hadasho Soomaali, waaxda luqadaha, qaybta kaalmada adeegyada, waxay idiin hayaan adeeg kharash la'aan . So [facility] [place] [facility] [phone].    ATENCIÓN: Si habla español, tiene a davis disposición servicios gratuitos de asistencia lingüística. Llame al [phone].    We comply with applicable federal civil rights laws and [place] laws. We do not discriminate on the basis of race, color, national origin, age, disability, sex, sexual orientation, or gender identity.            Thank you!     Thank you for choosing [facility]  [facility]. Our goal is always to provide you with excellent care. Hearing back from our patients is one way we can continue to improve our services. Please take a few minutes to complete the written survey that you may receive in the mail after your visit with us. Thank you!             Your Updated Medication List - Protect others around you: Learn how to safely use, store and throw away your medicines at www.disposemymeds.org.          This list is accurate as of: 11/10/17 12:20 PM.  Always use your most recent med list.                   Brand Name Dispense Instructions for use Diagnosis    albuterol 108 (90 BASE) MCG/ACT Inhaler    PROAIR HFA/PROVENTIL HFA/VENTOLIN HFA    1 Inhaler    Inhale 2 puffs into the lungs every 6 hours as needed for shortness of breath / dyspnea or wheezing    Dyspnea, unspecified type       ARIPiprazole 5 MG tablet    ABILIFY    30 tablet    Take 1 tablet (5 mg) by mouth daily    Bipolar I disorder (H)       omeprazole 20 MG CR capsule     priLOSEC    30 capsule    Take 1 capsule (20 mg) by mouth daily    Gastroesophageal reflux disease without esophagitis       * QUEtiapine 300 MG 24 hr tablet    SEROquel XR    60 tablet    TAKE 2 TABLETS BY MOUTH AT BEDTIME    Bipolar I disorder, most recent episode (or current) manic (H)       * QUEtiapine 50 MG Tb24 24 hr tablet    SEROquel XR    60 tablet    TAKE 2 TABLETS BY MOUTH DAILY    Bipolar I disorder, most recent episode (or current) manic (H)       * Notice:  This list has 2 medication(s) that are the same as other medications prescribed for you. Read the directions carefully, and ask your doctor or other care provider to review them with you.

## 2017-11-10 NOTE — PROGRESS NOTES
PSYCHIATRY CLINIC PROGRESS NOTE   20 minute medication management, more than 50% of time spent counseling patient on medications, medication side effects, symptom history and management   SUBJECTIVE / INTERIM HISTORY                                                                        Social- with ISABEL Sandhu and her kids Children- Phoebe's kids are 5, 7 , and 10 yo  Last visit:      -- Seroquel is currently 100 mg am and 600 mg evening. Decrease the evening from 600 to 500 mg for 3 days; then down to 400 mg for 3 days. Once you get down to 400 mg evening we will have you start the new one Abilify (aripiprazole): Abilify start at 5 mg daily.    So you will be on both for a while: 400 mg evening of Seroquel and 5 mg of Abilify. EKG March '17    - INSURANCE will NOT cover Saprhis even though we tried a PA   - Tegretol: rash so d/c  - we reviewed triglycerides / labs from May '17 and I noted very likely Seroquel contributing to the elevated lipids. Jani noted he is trying to eat better and for example reducing portion sizes, not eating big heavy brunches like he used to.   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  - still not sleeping much: 4 hours, 6 would be good if he could. Wakes up 5-6 times per night and then if doesn't sleep well night sleeps in am  -  Thinks Seroquel helps somewhat and has been on higher dose in past of 800-900.  - aggressive in past while manic.  SUBSTANCE USE- smokes 1/2 PPD since age 17 yo.  past use  Meth will be year in about [place] clean. , MJ. tx [place] and [facility]. EtOH now rare.    SYMPTOMS-  + middle and early insomnia.  nightmares, flashbacks, detached, angry outbursts, self-destructive, startle response, hypervigilance and fear Sleep now: about 2-3 hours then up for awhile, then another hour. His goal would be 6 continuous.  MEDICAL ROS-  SOB: usually at night. But sometimes during day as well not even with exertion.  increased appetite /  "weight  MEDICAL / SURGICAL HISTORY                   Patient Active Problem List   Diagnosis     Bipolar disorder with psychotic features (H)     Insomnia     History of mental disorder     Attention deficit disorder     Posttraumatic stress disorder     Decreased sex drive     Gastroesophageal reflux disease without esophagitis     ALLERGY   Bee venom; Latex; Percocet [oxycodone-acetaminophen]; Toradol; Tramadol; and Tegretol [carbamazepine]  MEDICATIONS                                                                                             Current Outpatient Prescriptions   Medication Sig     QUEtiapine (SEROQUEL XR) 50 MG TB24 24 hr tablet TAKE 2 TABLETS BY MOUTH DAILY     QUEtiapine (SEROQUEL XR) 300 MG 24 hr tablet TAKE 2 TABLETS BY MOUTH AT BEDTIME     albuterol (PROAIR HFA/PROVENTIL HFA/VENTOLIN HFA) 108 (90 BASE) MCG/ACT Inhaler Inhale 2 puffs into the lungs every 6 hours as needed for shortness of breath / dyspnea or wheezing     omeprazole (PRILOSEC) 20 MG CR capsule Take 1 capsule (20 mg) by mouth daily     No current facility-administered medications for this visit.          PAST MEDICATION TRIALS    Prozac (fluoxetine), Zoloft (sertraline), Paxil (paroxetine) and Cymbalta (duloxetine). Paxil sexual SEs and constipation. Mirtazapine ineffective for insomnia.   Elavil (amitriptyline).   Lithium Carbonate, Depakote and Depakot ER (valproate) and Neurontin. Topamax didn't help (gabapentin). Lithium \"that was like eating pennies and nickels\" but helped / effective. Didn't like the blood level draws with lithium. Doesn't think has been on tegretol or trileptal.   Seroquel (quetiapine).   no older antipsychotics.   Xanax (alprazolam). Doesn't think has been on Buspar   Ambien (zolpidem) and Lunesta (eszopiclone). Lunesta helped initial insomnia, NOT middle. Ambien made him violent and agitated.       VITALS   /70 (BP Location: Right arm, Patient Position: Sitting, Cuff Size: Adult Regular)  Pulse 79 "  Temp 98  F (36.7  C) (Tympanic)  Wt 215 lb (97.5 kg)  BMI 29.16 kg/m2     PHQ9                     [unfilled]  LABS                                                                                                                           Recent Labs   Lab Test  05/08/17   0919   CHOL  258*   HDL  27*   LDL  Cannot estimate LDL when triglyceride exceeds 400 mg/dL   TRIG  403*     Fasting glucose 97 5/8/17     ms (on Seroquel) March 2017    MENTAL STATUS EXAM                                                                                        Alert. Oriented to person, place, and date / time. Casually groomed, calm, cooperative with good eye contact. No problems with speech or psychomotor behavior. Mood was described as tired and affect was congruent to speech content and full range. Thought process, including associations, was unremarkable and thought content was devoid of suicidal and homicidal ideation and psychotic thought. No hallucinations. Insight was good. Judgment was intact and adequate for safety. Fund of knowledge was intact. Pt demonstrates no obvious problems with attention, concentration, language, recent or remote memory although these were not formally tested.     ASSESSMENT                                                                                                      HISTORICAL:  Initial psych note 9/14/16        NOTES:  Depakote in past didn't like it. Lithium in past metallic past but did work: doesn't want to go on it again.  Jani Langford is a 36 yo male with history of bipolar disorder, PTSD and chem dep - on Seroquel total 700 mg and been on for awhile. Main issue is sleep : has not had luck with sleep meds so discussed I will certainly try but given he has not found past meds effective more unlikely we are going to find med that works. We tried Remeron with no luck. We tried Topamax and no luck. He saw Dr. Garcia  and started on Mirapex. Last visit we were going to  "try Doxepin but insurance wouldn't cover. We tried Belsomra and didn't ehlp.    We have had discussions re: Seroquel -- he has reasonable concerns with increased risk lipids, glucose and thus potential increased risk cardiovascular risk..There are alternatives however I do have concern his sx could worsen and so does he and his SO Phoebe worry aobut this. We decided on Tegretol and he ended up with a rash. We reviewed other ideas for mood stabilizer and given sleep / insomnia an issue I would favor one he could take evening with hope of it being sedating / helping him sleep. We agreed on Saphris and insurance won't cover... Insurance gave us list he has to try first so today we agreed on Abilify.    OF NOTE\" EKG from MARCH '17 with normal QTC we reviewed this today as combo of Saphris and Seroquel all the more could prolong QTc. We will likely check in on EKG in month or so make sure still is normal.      TREATMENT RISK STATEMENT:  The risks, benefits, alternatives and potential adverse effects have been explained and are understood by the pt.  The pt agrees to the treatment plan with the ability to do so.   The pt knows to call the clinic for any problems or access emergency care if needed.        DIAGNOSES                 (Use of Axes system will continue, even though absent from DSM 5)         Axis I   - Bipolar I disorder with history of psychotic features               PTSD   Axis II  - no dx  Axis III - no major medical issues  Axis IV-  Psychosocial Stressors include: mod  Axis V - Global Assessment of Functioning current: 45    PLAN                                                                                                                    1)  MEDICATIONS:         -- Seroquel is currently 100 mg am and 600 mg evening. Decrease the evening from 600 to 500 mg for 3 days; then down to 400 mg for 3 days. Once you get down to 400 mg evening we will have you start the new one Abilify (aripiprazole): Abilify " start at 5 mg daily.    So you will be on both for a while: 400 mg evening of Seroquel and 5 mg of Abilify. EKG March '17    2)  THERAPY:  No Change    3)  LABS:  lipid panel and glucose, LFTs, BMP including fasting glucose were done in May    4)  PT MONITOR [call for probs]:  Worsening symptoms, SI/HI, SEs from meds    5)  REFERRALS [CD, medical, other]: none    6)  RTC: 3 - 4 weeks

## 2017-11-11 ASSESSMENT — ANXIETY QUESTIONNAIRES: GAD7 TOTAL SCORE: 10

## 2017-11-22 ENCOUNTER — OFFICE VISIT (OUTPATIENT)
Dept: FAMILY MEDICINE | Facility: OTHER | Age: 37
End: 2017-11-22
Attending: FAMILY MEDICINE
Payer: COMMERCIAL

## 2017-11-22 VITALS
HEIGHT: 73 IN | SYSTOLIC BLOOD PRESSURE: 94 MMHG | HEART RATE: 103 BPM | BODY MASS INDEX: 27.43 KG/M2 | TEMPERATURE: 99.2 F | RESPIRATION RATE: 16 BRPM | OXYGEN SATURATION: 95 % | DIASTOLIC BLOOD PRESSURE: 62 MMHG | WEIGHT: 207 LBS

## 2017-11-22 DIAGNOSIS — Z71.6 TOBACCO ABUSE COUNSELING: ICD-10-CM

## 2017-11-22 DIAGNOSIS — F31.9 BIPOLAR DISORDER WITH PSYCHOTIC FEATURES (H): ICD-10-CM

## 2017-11-22 DIAGNOSIS — Z72.0 TOBACCO ABUSE: ICD-10-CM

## 2017-11-22 DIAGNOSIS — K21.9 GASTROESOPHAGEAL REFLUX DISEASE WITHOUT ESOPHAGITIS: Primary | ICD-10-CM

## 2017-11-22 LAB
ANION GAP SERPL CALCULATED.3IONS-SCNC: 10 MMOL/L (ref 3–14)
BUN SERPL-MCNC: 8 MG/DL (ref 7–30)
CALCIUM SERPL-MCNC: 9.1 MG/DL (ref 8.5–10.1)
CHLORIDE SERPL-SCNC: 104 MMOL/L (ref 94–109)
CHOLEST SERPL-MCNC: 216 MG/DL
CO2 SERPL-SCNC: 25 MMOL/L (ref 20–32)
CREAT SERPL-MCNC: 0.86 MG/DL (ref 0.66–1.25)
GFR SERPL CREATININE-BSD FRML MDRD: >90 ML/MIN/1.7M2
GLUCOSE SERPL-MCNC: 126 MG/DL (ref 70–99)
HDLC SERPL-MCNC: 25 MG/DL
LDLC SERPL CALC-MCNC: 132 MG/DL
NONHDLC SERPL-MCNC: 191 MG/DL
POTASSIUM SERPL-SCNC: 3.7 MMOL/L (ref 3.4–5.3)
SODIUM SERPL-SCNC: 139 MMOL/L (ref 133–144)
TRIGL SERPL-MCNC: 295 MG/DL

## 2017-11-22 PROCEDURE — 36415 COLL VENOUS BLD VENIPUNCTURE: CPT | Mod: ZL | Performed by: FAMILY MEDICINE

## 2017-11-22 PROCEDURE — 99212 OFFICE O/P EST SF 10 MIN: CPT

## 2017-11-22 PROCEDURE — 80061 LIPID PANEL: CPT | Mod: ZL | Performed by: FAMILY MEDICINE

## 2017-11-22 PROCEDURE — 80048 BASIC METABOLIC PNL TOTAL CA: CPT | Mod: ZL | Performed by: FAMILY MEDICINE

## 2017-11-22 PROCEDURE — 99214 OFFICE O/P EST MOD 30 MIN: CPT | Performed by: FAMILY MEDICINE

## 2017-11-22 ASSESSMENT — ANXIETY QUESTIONNAIRES
6. BECOMING EASILY ANNOYED OR IRRITABLE: SEVERAL DAYS
2. NOT BEING ABLE TO STOP OR CONTROL WORRYING: MORE THAN HALF THE DAYS
3. WORRYING TOO MUCH ABOUT DIFFERENT THINGS: NEARLY EVERY DAY
1. FEELING NERVOUS, ANXIOUS, OR ON EDGE: MORE THAN HALF THE DAYS
IF YOU CHECKED OFF ANY PROBLEMS ON THIS QUESTIONNAIRE, HOW DIFFICULT HAVE THESE PROBLEMS MADE IT FOR YOU TO DO YOUR WORK, TAKE CARE OF THINGS AT HOME, OR GET ALONG WITH OTHER PEOPLE: EXTREMELY DIFFICULT
5. BEING SO RESTLESS THAT IT IS HARD TO SIT STILL: SEVERAL DAYS
7. FEELING AFRAID AS IF SOMETHING AWFUL MIGHT HAPPEN: MORE THAN HALF THE DAYS
GAD7 TOTAL SCORE: 14

## 2017-11-22 ASSESSMENT — PATIENT HEALTH QUESTIONNAIRE - PHQ9
5. POOR APPETITE OR OVEREATING: NEARLY EVERY DAY
SUM OF ALL RESPONSES TO PHQ QUESTIONS 1-9: 11

## 2017-11-22 ASSESSMENT — PAIN SCALES - GENERAL: PAINLEVEL: NO PAIN (0)

## 2017-11-22 NOTE — MR AVS SNAPSHOT
After Visit Summary   11/22/2017    Jain Langford    MRN: [id number]           Patient Information     Date Of Birth          1980        Visit Information        Provider Department      11/22/2017 9:15 AM Selwyn Lopez MD [facility]        Today's Diagnoses     Gastroesophageal reflux disease without esophagitis    -  1    Tobacco abuse        Tobacco abuse counseling        Bipolar disorder with psychotic features (H)          Care Instructions      HOW TO QUIT SMOKING  Smoking is one of the hardest habits to break. About half of all those who have ever smoked have been able to quit, and most of those (about 70%) who still smoke want to quit. Here are some of the best ways to stop smoking.     KEEP TRYING:  It takes most smokers about 8 tries before they are finally able to fully quit. So, the more often you try and fail, the better your chance of quitting the next time! So, don't give up!    GO COLD TURKEY:  Most ex-smokers quit cold turkey. Trying to cut back gradually doesn't seem to work as well, perhaps because it continues the smoking habit. Also, it is possible to fool yourself by inhaling more while smoking fewer cigarettes. This results in the same amount of nicotine in your body!    GET SUPPORT:  Support programs can make an important difference, especially for the heavy smoker. These groups offer lectures, methods to change your behavior and peer support. Call the free national Quitline for more information. 800-QUIT-NOW (564-672-0115). Low-cost or free programs are offered by many hospitals, local chapters of the American Lung Association (369-668-4726) and the American Cancer Society (309-556-7431). Support at home is important too. Non-smokers can help by offering praise and encouragement. If the smoker fails to quit, encourage them to try again!    OVER-THE-COUNTER MEDICINES:  For those who can't quit on their own, Nicotine Replacement Therapy (NRT) may make  quitting much easier. Certain aids such as the nicotine patch, gum and lozenge are available without a prescription. However, it is best to use these under the guidance of your doctor. The skin patch provides a steady supply of nicotine to the body. Nicotine gum and lozenge gives temporary bursts of low levels of nicotine. Both methods take the edge off the craving for cigarettes. WARNING: If you feel symptoms of nicotine overdose, such as nausea, vomiting, dizziness, weakness, or fast heartbeat, stop using these and see your doctor.    PRESCRIPTION MEDICINES:  After evaluating your smoking patterns and prior attempts at quitting, your doctor may offer a prescription medicine such as bupropion (Zyban, Wellbutrin), varenicline (Chantix, Champix), a niocotine inhaler or nasal spray. Each has its unique advantage and side effects which your doctor can review with you.    HEALTH BENEFITS OF QUITTING:  The benefits of quitting start right away and keep improving the longer you go without smokin minutes: blood pressure and pulse return to normal  8 hours: oxygen levels return to normal  2 days: ability to smell and taste begins to improve as damaged nerves start to regrow  2-3 weeks: circulation and lung function improves  1-9 months: decreased cough, congestion and shortness of breath; less tired  1 year: risk of heart attack decreases by half  5 years: risk of lung cancer decreases by half; risk of stroke becomes the same as a non-smoker  For information about how to quit smoking, visit the following links:  National Cancer [place] ,   Clearing the Air, Quit Smoking Today   - an online booklet. http://www.smokefree.gov/pubs/clearing_the_air.pdf  Smokefree.gov http://smokefree.gov/  QuitNet http://www.quitnet.com/    8160-7367 Natalia [employer], [address] [place] [address], [place] [address]. All rights reserved. This information is not intended as a substitute for professional medical care. Always follow your  healthcare professional's instructions.    The Benefits of Living Smoke Free  What do you want to gain from quitting? Check off some reasons to quit.  Health Benefits  ___ Reduce my risk of lung cancer, heart disease, chronic lung disease  ___ Have fewer wrinkles and softer skin  ___ Improve my sense of taste and smell  ___ For pregnant women--reduce the risk of having a miscarriage, stillbirth, premature birth, or low-birth-weight baby  Personal Benefits  ___ Feel more in control of my life  ___ Have better-smelling hair, breath, clothes, home, and car  ___ Save time by not having to take smoke breaks, buy cigarettes, or hunt for a light  ___ Have whiter teeth  Family Benefits  ___ Reduce my children s respiratory tract infections  ___ Set a good example for my children  ___ Reduce my family s cancer risk  Financial Benefits  ___ Save hundreds of dollars each year that would be spent on cigarettes  ___ Save money on medical bills  ___ Save on life, health, and car insurance premiums    Those Dollars Add Up!  Cigarettes are expensive, and getting more expensive all the time. Do you realize how much money you are spending on cigarettes per year? What is the average amount you spend on a pack of cigarettes? What is the average number of packs that you smoke per day? Using your answers to these questions, fill in this formula to help you find out:  ($ _____ per pack) ×  ( _____ number of packs per day) × (365 days) =  $ _____ yearly cost of smoking  Besides tobacco, there are other costs, including extra cleaning bills and replacement costs for clothing and furniture; medical expenses for smoking-related illnesses; and higher health, life, and car insurance premiums.    Cigars and Pipes Count Too!  Cigars and pipes are also dangerous. So are smokeless (chewing) tobacco and snuff. All of these products contain nicotine, a highly addictive substance that has harmful effects on your body. Quitting smoking means giving up  "all tobacco products.      8874-3041 Natal[facility], [address] [place] [address], [place] [address]. All rights reserved. This information is not intended as a substitute for professional medical care. Always follow your healthcare professional's instructions.          Follow-ups after your visit        Your next 10 appointments already scheduled     Dec 04, 2017  9:40 AM CST   (Arrive by 9:25 AM)   Return Visit with Renee Vuong MD   [facility] [place] ([facility] [place] )    [address] [place] [address]  [place] [address] [phone]              Who to contact     If you have questions or need follow up information about today's clinic visit or your schedule please contact [facility] directly at [phone].  Normal or non-critical lab and imaging results will be communicated to you by MyChart, letter or phone within 4 business days after the clinic has received the results. If you do not hear from us within 7 days, please contact the clinic through MyChart or phone. If you have a critical or abnormal lab result, we will notify you by phone as soon as possible.  Submit refill requests through [employer] or call your pharmacy and they will forward the refill request to us. Please allow 3 business days for your refill to be completed.          Additional Information About Your Visit        Care EveryWhere ID     This is your Care EveryWhere ID. This could be used by other organizations to access your [place] medical records  [id number]        Your Vitals Were     Pulse Temperature Respirations Height Pulse Oximetry BMI (Body Mass Index)    103 99.2  F (37.3  C) (Tympanic) 16 6' 0.75\" (1.848 m) 95% 27.5 kg/m2       Blood Pressure from Last 3 Encounters:   11/22/17 94/62   11/10/17 114/70   10/27/17 108/68    Weight from Last 3 Encounters:   11/22/17 207 lb (93.9 kg)   11/10/17 215 lb (97.5 kg)   10/27/17 215 lb (97.5 kg)              We Performed the Following     Basic metabolic " panel     Lipid Profile (Chol, Trig, HDL, LDL calc)     Tobacco Cessation - Order to Satisfy Health Maintenance          Today's Medication Changes          These changes are accurate as of: 11/22/17  9:15 AM.  If you have any questions, ask your nurse or doctor.               These medicines have changed or have updated prescriptions.        Dose/Directions    QUEtiapine 300 MG 24 hr tablet   Commonly known as:  SEROquel XR   This may have changed:  Another medication with the same name was removed. Continue taking this medication, and follow the directions you see here.   Used for:  Bipolar I disorder, most recent episode (or current) manic (H)   Changed by:  Renee Vuong MD        TAKE 2 TABLETS BY MOUTH AT BEDTIME   Quantity:  60 tablet   Refills:  11                Primary Care Provider Office Phone # Fax #    R Wilmer Mendez [phone]       [place] [address]        Equal Access to Services     [facility]: Hadii aad ku hadasho Soomaali, waaxda luqadaha, qaybta kaalmada adeegyada, waxay idiin hayaan dulce mcleod . So [facility] [phone].    ATENCIÓN: Si habla español, tiene a davis disposición servicios gratuitos de asistencia lingüística. Isak al [phone].    We comply with applicable federal civil rights laws and [place] laws. We do not discriminate on the basis of race, color, national origin, age, disability, sex, sexual orientation, or gender identity.            Thank you!     Thank you for choosing [facility]  for your care. Our goal is always to provide you with excellent care. Hearing back from our patients is one way we can continue to improve our services. Please take a few minutes to complete the written survey that you may receive in the mail after your visit with us. Thank you!             Your Updated Medication List - Protect others around you: Learn how to safely use, store and throw away your  medicines at www.disposemymeds.org.          This list is accurate as of: 11/22/17  9:15 AM.  Always use your most recent med list.                   Brand Name Dispense Instructions for use Diagnosis    albuterol 108 (90 BASE) MCG/ACT Inhaler    PROAIR HFA/PROVENTIL HFA/VENTOLIN HFA    1 Inhaler    Inhale 2 puffs into the lungs every 6 hours as needed for shortness of breath / dyspnea or wheezing    Dyspnea, unspecified type       ARIPiprazole 5 MG tablet    ABILIFY    30 tablet    Take 1 tablet (5 mg) by mouth daily    Bipolar I disorder (H)       omeprazole 20 MG CR capsule    priLOSEC    30 capsule    Take 1 capsule (20 mg) by mouth daily    Gastroesophageal reflux disease without esophagitis       QUEtiapine 300 MG 24 hr tablet    SEROquel XR    60 tablet    TAKE 2 TABLETS BY MOUTH AT BEDTIME    Bipolar I disorder, most recent episode (or current) manic (H)

## 2017-11-22 NOTE — PROGRESS NOTES
Yvonnelaisha Langford    November 22, 2017    Chief Complaint   Patient presents with     Recheck Medication     Pt is in for a FU on medication and to have his cholesterol rechecked.       SUBJECTIVE:  Here for f/u.  Doing a bit worse mentally.  See below.  Otherwise gerd ok and using prilosec only as needed; he doesn't usually need it.      History reviewed. No pertinent past medical history.    Past Surgical History:   Procedure Laterality Date     DENTAL SURGERY      pulled 2 teeth month ago     ENT SURGERY      sinus       Current Outpatient Prescriptions   Medication Sig Dispense Refill     ARIPiprazole (ABILIFY) 5 MG tablet Take 1 tablet (5 mg) by mouth daily 30 tablet 3     QUEtiapine (SEROQUEL XR) 300 MG 24 hr tablet TAKE 2 TABLETS BY MOUTH AT BEDTIME 60 tablet 11     albuterol (PROAIR HFA/PROVENTIL HFA/VENTOLIN HFA) 108 (90 BASE) MCG/ACT Inhaler Inhale 2 puffs into the lungs every 6 hours as needed for shortness of breath / dyspnea or wheezing 1 Inhaler 1     omeprazole (PRILOSEC) 20 MG CR capsule Take 1 capsule (20 mg) by mouth daily 30 capsule 3     [DISCONTINUED] QUEtiapine (SEROQUEL XR) 50 MG TB24 24 hr tablet TAKE 2 TABLETS BY MOUTH DAILY 60 tablet 11       Allergies   Allergen Reactions     Bee Venom      Latex Hives     Percocet [Oxycodone-Acetaminophen]      Short of breath vomiting     Toradol      seizure     Tramadol      Short of breath and vomiting     Tegretol [Carbamazepine] Rash       Family History   Problem Relation Age of Onset     MENTAL ILLNESS Mother      Depression Mother        Social History     Social History     Marital status: Single     Spouse name: N/A     Number of children: N/A     Years of education: N/A     Occupational History     Not on file.     Social History Main Topics     Smoking status: Current Every Day Smoker     Packs/day: 0.75     Smokeless tobacco: Never Used      Comment: trying to quit     Alcohol use Yes      Comment: rare     Drug use: Yes     Special: Marijuana      Sexual activity: Not on file     Other Topics Concern     Parent/Sibling W/ Cabg, Mi Or Angioplasty Before 65f 55m? No     Social History Narrative       5 point ROS negative except as noted above in HPI, including Gen., Resp., CV, GI &  system review.     OBJECTIVE:  B/P: 94/62, T: 99.2, P: 103, R: 16    GENERAL APPEARANCE: Alert, no acute distress  CV: regular rate and rhythm, no murmur, rub or gallop  RESP: lungs clear to auscultation bilaterally  ABDOMEN: normal bowel sounds, soft, nontender, no hepatosplenomegaly or other masses  SKIN: no suspicious lesions or rashes to visualized skin  NEURO: Alert, oriented x 3, speech and mentation normal    ASSESSMENT and PLAN:  (K21.9) Gastroesophageal reflux disease without esophagitis  (primary encounter diagnosis)  Comment: stable.   Plan: generally takes PRN.  No change there.      (Z72.0) Tobacco abuse  Comment: not interested in quitting.   Plan: Tobacco Cessation - Order to Satisfy Health         Maintenance        He will let us know if he wants to quit.      (Z71.6) Tobacco abuse counseling  Comment: as above.   Plan: as above.     (F31.9) Bipolar disorder with psychotic features (H)  Comment[facility].  He's getting by but struggling.    Plan: Lipid Profile (Chol, Trig, HDL, LDL calc),         Basic metabolic panel        Tough to sleep.  Appreciate Dr. Vuong's help.  weening down the seroquel and up on the abilify with Dr. Vuong.  I am checking lipid and bmp today to monitor for metabolic side effects.

## 2017-11-22 NOTE — PATIENT INSTRUCTIONS

## 2017-11-22 NOTE — NURSING NOTE
"Chief Complaint   Patient presents with     Recheck Medication     Pt is in for a FU on medication and to have his cholesterol rechecked.       Initial BP 94/62 (BP Location: Right arm, Patient Position: Chair, Cuff Size: Adult Large)  Pulse 103  Temp 99.2  F (37.3  C) (Tympanic)  Resp 16  Ht 6' 0.75\" (1.848 m)  Wt 207 lb (93.9 kg)  SpO2 95%  BMI 27.5 kg/m2 Estimated body mass index is 27.5 kg/(m^2) as calculated from the following:    Height as of this encounter: 6' 0.75\" (1.848 m).    Weight as of this encounter: 207 lb (93.9 kg).  Medication Reconciliation: complete   Annette Figueroa    "

## 2017-11-23 ASSESSMENT — ANXIETY QUESTIONNAIRES: GAD7 TOTAL SCORE: 14

## 2017-12-04 ENCOUNTER — OFFICE VISIT (OUTPATIENT)
Dept: PSYCHIATRY | Facility: OTHER | Age: 37
End: 2017-12-04
Attending: PSYCHIATRY & NEUROLOGY
Payer: COMMERCIAL

## 2017-12-04 VITALS
HEART RATE: 102 BPM | WEIGHT: 207 LBS | TEMPERATURE: 98.8 F | DIASTOLIC BLOOD PRESSURE: 70 MMHG | SYSTOLIC BLOOD PRESSURE: 112 MMHG | BODY MASS INDEX: 27.5 KG/M2

## 2017-12-04 DIAGNOSIS — F31.9 BIPOLAR I DISORDER (H): Primary | ICD-10-CM

## 2017-12-04 PROCEDURE — 99212 OFFICE O/P EST SF 10 MIN: CPT

## 2017-12-04 PROCEDURE — 99214 OFFICE O/P EST MOD 30 MIN: CPT | Performed by: PSYCHIATRY & NEUROLOGY

## 2017-12-04 RX ORDER — ARIPIPRAZOLE 10 MG/1
10 TABLET ORAL DAILY
Qty: 30 TABLET | Refills: 3 | Status: SHIPPED | OUTPATIENT
Start: 2017-12-04 | End: 2018-02-28

## 2017-12-04 RX ORDER — QUETIAPINE FUMARATE 50 MG/1
TABLET, EXTENDED RELEASE ORAL
Qty: 60 EACH | Refills: 3 | Status: SHIPPED | OUTPATIENT
Start: 2017-12-04 | End: 2018-01-08

## 2017-12-04 RX ORDER — CLONAZEPAM 0.5 MG/1
0.5 TABLET ORAL DAILY PRN
Qty: 30 TABLET | Refills: 3 | Status: SHIPPED | OUTPATIENT
Start: 2017-12-04 | End: 2018-01-08 | Stop reason: DRUGHIGH

## 2017-12-04 ASSESSMENT — ANXIETY QUESTIONNAIRES
1. FEELING NERVOUS, ANXIOUS, OR ON EDGE: MORE THAN HALF THE DAYS
2. NOT BEING ABLE TO STOP OR CONTROL WORRYING: MORE THAN HALF THE DAYS
3. WORRYING TOO MUCH ABOUT DIFFERENT THINGS: NEARLY EVERY DAY
IF YOU CHECKED OFF ANY PROBLEMS ON THIS QUESTIONNAIRE, HOW DIFFICULT HAVE THESE PROBLEMS MADE IT FOR YOU TO DO YOUR WORK, TAKE CARE OF THINGS AT HOME, OR GET ALONG WITH OTHER PEOPLE: EXTREMELY DIFFICULT
5. BEING SO RESTLESS THAT IT IS HARD TO SIT STILL: MORE THAN HALF THE DAYS
7. FEELING AFRAID AS IF SOMETHING AWFUL MIGHT HAPPEN: MORE THAN HALF THE DAYS
GAD7 TOTAL SCORE: 17
6. BECOMING EASILY ANNOYED OR IRRITABLE: NEARLY EVERY DAY

## 2017-12-04 ASSESSMENT — PATIENT HEALTH QUESTIONNAIRE - PHQ9
SUM OF ALL RESPONSES TO PHQ QUESTIONS 1-9: 16
5. POOR APPETITE OR OVEREATING: NEARLY EVERY DAY

## 2017-12-04 ASSESSMENT — PAIN SCALES - GENERAL: PAINLEVEL: NO PAIN (0)

## 2017-12-04 NOTE — PROGRESS NOTES
PSYCHIATRY CLINIC PROGRESS NOTE   20 minute medication management, more than 50% of time spent counseling patient on medications, medication side effects, symptom history and management   SUBJECTIVE / INTERIM HISTORY                                                                        Social- with ISABEL Sandhu and her kids Children- Phoebe's kids are 5, 7 , and 12 yo  Last visit:      -- Seroquel is currently 100 mg am and 600 mg evening. Decrease the evening from 600 to 500 mg for 3 days; then down to 400 mg for 3 days. Once you get down to 400 mg evening we will have you start the new one Abilify (aripiprazole): Abilify start at 5 mg daily.    So you will be on both for a while: 400 mg evening of Seroquel and 5 mg of Abilify. EKG March '17    - dropped down on Seroquel and mood irritable, not sleeping as well. Now on 400 mg and bit of Abilify. Thus far it has been difficult ...  - get together Thanksgiving - got physical  - INSURANCE will NOT cover Saprhis even though we tried a PA   - Tegretol: rash so d/c  - we reviewed triglycerides / labs from May '17 and I noted very likely Seroquel contributing to the elevated lipids. Jani noted he is trying to eat better and for example reducing portion sizes, not eating big heavy brunches like he used to.   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  - still not sleeping much: 4 hours, 6 would be good if he could. Wakes up 5-6 times per night and then if doesn't sleep well night sleeps in am  -  Thinks Seroquel helps somewhat and has been on higher dose in past of 800-900.  - aggressive in past while manic.  SUBSTANCE USE- smokes 1/2 PPD since age 15 yo.  past use  Meth will be year in about [place] clean. , MJ. tx [place] and [facility]. EtOH now rare.    SYMPTOMS-  + middle and early insomnia.  nightmares, flashbacks, detached, angry outbursts, self-destructive, startle response, hypervigilance and fear Sleep now: about 2-3 hours then up for  "awhile, then another hour. His goal would be 6 continuous.  MEDICAL ROS-  SOB: usually at night. But sometimes during day as well not even with exertion.  increased appetite / weight  MEDICAL / SURGICAL HISTORY                   Patient Active Problem List   Diagnosis     Bipolar disorder with psychotic features (H)     Insomnia     History of mental disorder     Attention deficit disorder     Posttraumatic stress disorder     Decreased sex drive     Gastroesophageal reflux disease without esophagitis     ALLERGY   Bee venom; Latex; Percocet [oxycodone-acetaminophen]; Toradol; Tramadol; and Tegretol [carbamazepine]  MEDICATIONS                                                                                             Current Outpatient Prescriptions   Medication Sig     ARIPiprazole (ABILIFY) 5 MG tablet Take 1 tablet (5 mg) by mouth daily     QUEtiapine (SEROQUEL XR) 300 MG 24 hr tablet TAKE 2 TABLETS BY MOUTH AT BEDTIME     albuterol (PROAIR HFA/PROVENTIL HFA/VENTOLIN HFA) 108 (90 BASE) MCG/ACT Inhaler Inhale 2 puffs into the lungs every 6 hours as needed for shortness of breath / dyspnea or wheezing     omeprazole (PRILOSEC) 20 MG CR capsule Take 1 capsule (20 mg) by mouth daily     No current facility-administered medications for this visit.          PAST MEDICATION TRIALS    Prozac (fluoxetine), Zoloft (sertraline), Paxil (paroxetine) and Cymbalta (duloxetine). Paxil sexual SEs and constipation. Mirtazapine ineffective for insomnia.   Elavil (amitriptyline).   Lithium Carbonate, Depakote and Depakot ER (valproate) and Neurontin. Topamax didn't help (gabapentin). Lithium \"that was like eating pennies and nickels\" but helped / effective. Didn't like the blood level draws with lithium. Doesn't think has been on tegretol or trileptal.   Seroquel (quetiapine).   no older antipsychotics.   Xanax (alprazolam). Doesn't think has been on Buspar   Ambien (zolpidem) and Lunesta (eszopiclone). Lunesta helped initial " insomnia, NOT middle. Ambien made him violent and agitated.       VITALS   /70 (BP Location: Right arm, Patient Position: Sitting, Cuff Size: Adult Regular)  Pulse 102  Temp 98.8  F (37.1  C) (Tympanic)  Wt 207 lb (93.9 kg)  BMI 27.5 kg/m2     PHQ9                     [unfilled]  LABS                                                                                                                           Recent Labs   Lab Test  11/22/17   0914  05/08/17   0919   CHOL  216*  258*   HDL  25*  27*   LDL  132*  Cannot estimate LDL when triglyceride exceeds 400 mg/dL   TRIG  295*  403*     Glucose 126 11/22/17 and was 97 5/8/17.      ms (on Seroquel) March 2017    MENTAL STATUS EXAM                                                                                        Alert. Oriented to person, place, and date / time. Casually groomed, calm, cooperative with good eye contact. No problems with speech or psychomotor behavior. Mood was described as tired and affect was congruent to speech content and full range. Thought process, including associations, was unremarkable and thought content was devoid of suicidal and homicidal ideation and psychotic thought. No hallucinations. Insight was good. Judgment was intact and adequate for safety. Fund of knowledge was intact. Pt demonstrates no obvious problems with attention, concentration, language, recent or remote memory although these were not formally tested.     ASSESSMENT                                                                                                      HISTORICAL:  Initial psych note 9/14/16        NOTES:  Depakote in past didn't like it. Lithium in past metallic past but did work: doesn't want to go on it again.  Jani Langford is a 38 yo male with history of bipolar disorder, PTSD and chem dep - on Seroquel total 700 mg and been on for awhile. Main issue is sleep : has not had luck with sleep meds so discussed I will certainly try but  given he has not found past meds effective more unlikely we are going to find med that works. We tried Remeron with no luck. We tried Topamax and no luck. He saw Dr. Garcia  and started on Mirapex. Last visit we were going to try Doxepin but insurance wouldn't cover. We tried Belsomra and didn't ehlp.    We have had discussions re: Seroquel -- he has reasonable concerns with increased risk lipids, glucose and thus potential increased risk cardiovascular risk..There are alternatives however I do have concern his sx could worsen and so does he and his SO Phoebe worry aobut this. We decided on Tegretol and he ended up with a rash. We reviewed other ideas for mood stabilizer and given sleep / insomnia an issue I would favor one he could take evening with hope of it being sedating / helping him sleep. We agreed on Saphris and insurance won't cover... Insurance gave us list he has to try first so today we agreed on Abilify.    This is definitely a risk vs. Benefit of medicaton change: for now he feels like continuing to try going down on Seroquel. And then increase Abilify. # s coming down on lipid panel but we will see how this goes... It's been tough mentally for him going down on quetipaine.      TREATMENT RISK STATEMENT:  The risks, benefits, alternatives and potential adverse effects have been explained and are understood by the pt.  The pt agrees to the treatment plan with the ability to do so.   The pt knows to call the clinic for any problems or access emergency care if needed.        DIAGNOSES                 (Use of Axes system will continue, even though absent from DSM 5)         Axis I   - Bipolar I disorder with history of psychotic features               PTSD   Axis II  - no dx  Axis III - no major medical issues  Axis IV-  Psychosocial Stressors include: mod  Axis V - Global Assessment of Functioning current: 45    PLAN                                                                                                                     1)  MEDICATIONS:         -- Seroquel is currently 400: we will go down to 300 for 3 days then down to 200 mg for 3 days; then down to 150 mg daily. Once at 150 mg of Seroquel you can increase the Ability from 5 mg to 10 mg. Also we will have you try Klonopin 0.5 mg while we are making this switch for sleep.      2)  THERAPY:  No Change    3)  LABS:  lipid panel and glucose, LFTs, BMP including fasting glucose were done in May '17 and then in nov '17 as was glucose. EKG 3/2017    4)  PT MONITOR [call for probs]:  Worsening symptoms, SI/HI, SEs from meds    5)  REFERRALS [CD, medical, other]: none    6)  RTC: 3 - 4 weeks

## 2017-12-04 NOTE — PATIENT INSTRUCTIONS
-- Seroquel is currently 400: we will go down to 300 for 3 days then down to 200 mg for 3 days; then down to 150 mg daily. Once at 150 mg of Seroquel you can increase the Ability from 5 mg to 10 mg. Also we will have you try Klonopin 0.5 mg while we are making this switch for sleep.

## 2017-12-04 NOTE — NURSING NOTE
"Chief Complaint   Patient presents with     RECHECK     Mental health.       Initial /70 (BP Location: Right arm, Patient Position: Sitting, Cuff Size: Adult Regular)  Pulse 102  Temp 98.8  F (37.1  C) (Tympanic)  Wt 207 lb (93.9 kg)  BMI 27.5 kg/m2 Estimated body mass index is 27.5 kg/(m^2) as calculated from the following:    Height as of 11/22/17: 6' 0.75\" (1.848 m).    Weight as of this encounter: 207 lb (93.9 kg).  Medication Reconciliation: complete     KIERAN GARCIA      "

## 2017-12-04 NOTE — MR AVS SNAPSHOT
After Visit Summary   12/4/2017    Jani Langford    MRN: [id number]           Patient Information     Date Of Birth          1980        Visit Information        Provider Department      12/4/2017 9:40 AM Renee Vuong MD [facility]        Today's Diagnoses     Bipolar I disorder (H)    -  1      Care Instructions     -- Seroquel is currently 400: we will go down to 300 for 3 days then down to 200 mg for 3 days; then down to 150 mg daily. Once at 150 mg of Seroquel you can increase the Ability from 5 mg to 10 mg. Also we will have you try Klonopin 0.5 mg while we are making this switch for sleep.          Follow-ups after your visit        Who to contact     If you have questions or need follow up information about today's clinic visit or your schedule please contact [facility] directly at [phone].  Normal or non-critical lab and imaging results will be communicated to you by MyChart, letter or phone within 4 business days after the clinic has received the results. If you do not hear from us within 7 days, please contact the clinic through MyChart or phone. If you have a critical or abnormal lab result, we will notify you by phone as soon as possible.  Submit refill requests through Tele[employer] or call your pharmacy and they will forward the refill request to us. Please allow 3 business days for your refill to be completed.          Additional Information About Your Visit        Care EveryWhere ID     This is your Care EveryWhere ID. This could be used by other organizations to access your [facility] medical records  [id number]        Your Vitals Were     Pulse Temperature BMI (Body Mass Index)             102 98.8  F (37.1  C) (Tympanic) 27.5 kg/m2          Blood Pressure from Last 3 Encounters:   12/04/17 112/70   11/22/17 94/62   11/10/17 114/70    Weight from Last 3 Encounters:   12/04/17 207 lb (93.9 kg)   11/22/17 207 lb (93.9 kg)   11/10/17 215 lb (97.5 kg)               Today, you had the following     No orders found for display         Today's Medication Changes          These changes are accurate as of: 12/4/17 10:25 AM.  If you have any questions, ask your nurse or doctor.               Start taking these medicines.        Dose/Directions    clonazePAM 0.5 MG tablet   Commonly known as:  klonoPIN   Used for:  Bipolar I disorder (H)   Started by:  Renee Vuong MD        Dose:  0.5 mg   Take 1 tablet (0.5 mg) by mouth daily as needed for anxiety   Quantity:  30 tablet   Refills:  3         These medicines have changed or have updated prescriptions.        Dose/Directions    * QUEtiapine 300 MG 24 hr tablet   Commonly known as:  SEROquel XR   This may have changed:  Another medication with the same name was added. Make sure you understand how and when to take each.   Used for:  Bipolar I disorder, most recent episode (or current) manic (H)   Changed by:  Renee Vuong MD        TAKE 2 TABLETS BY MOUTH AT BEDTIME   Quantity:  60 tablet   Refills:  11       * QUEtiapine 50 MG Tb24 24 hr tablet   Commonly known as:  SEROquel XR   This may have changed:  You were already taking a medication with the same name, and this prescription was added. Make sure you understand how and when to take each.   Used for:  Bipolar I disorder (H)   Changed by:  Renee Vuong MD        Take 6 tablets night at for 3 days then decrease down to 4 tablets night for 3 days then down to 3 tablets every night   Quantity:  60 each   Refills:  3       * Notice:  This list has 2 medication(s) that are the same as other medications prescribed for you. Read the directions carefully, and ask your doctor or other care provider to review them with you.         Where to get your medicines      Some of these will need a paper prescription and others can be bought over the counter.  Ask your nurse if you have questions.     Bring a paper prescription for each of these medications     clonazePAM  0.5 MG tablet         Call your pharmacy to confirm that your medication is ready for pickup. It may take up to 24 hours for them to receive the prescription. If the prescription is not ready within 3 business days, please contact your clinic or your provider.     We will let you know when these medications are ready. If you don't hear back within 3 business days, please contact us.     QUEtiapine 50 MG Tb24 24 hr tablet                Primary Care Provider Office Phone # Fax #    R Wilmer Mendez [phone]       [facility] [place] [address] [place] [id number]        Equal Access to Services     [facility]: Hadii kalia lima hadasho Somiller, waaxda luqadaha, qaybta kaalmada dulceyaregulo, [facility] . So [facility] [phone].    ATENCIÓN: Si habla español, tiene a davis disposición servicios gratuitos de asistencia lingüística. [facility] [phone].    We comply with applicable federal civil rights laws and [place] laws. We do not discriminate on the basis of race, color, national origin, age, disability, sex, sexual orientation, or gender identity.            Thank you!     Thank you for choosing [facility]  for your care. Our goal is always to provide you with excellent care. Hearing back from our patients is one way we can continue to improve our services. Please take a few minutes to complete the written survey that you may receive in the mail after your visit with us. Thank you!             Your Updated Medication List - Protect others around you: Learn how to safely use, store and throw away your medicines at www.disposemymeds.org.          This list is accurate as of: 12/4/17 10:25 AM.  Always use your most recent med list.                   Brand Name Dispense Instructions for use Diagnosis    albuterol 108 (90 BASE) MCG/ACT Inhaler    PROAIR HFA/PROVENTIL HFA/VENTOLIN HFA    1 Inhaler    Inhale 2 puffs into the lungs every 6 hours as  needed for shortness of breath / dyspnea or wheezing    Dyspnea, unspecified type       ARIPiprazole 5 MG tablet    ABILIFY    30 tablet    Take 1 tablet (5 mg) by mouth daily    Bipolar I disorder (H)       clonazePAM 0.5 MG tablet    klonoPIN    30 tablet    Take 1 tablet (0.5 mg) by mouth daily as needed for anxiety    Bipolar I disorder (H)       omeprazole 20 MG CR capsule    priLOSEC    30 capsule    Take 1 capsule (20 mg) by mouth daily    Gastroesophageal reflux disease without esophagitis       * QUEtiapine 300 MG 24 hr tablet    SEROquel XR    60 tablet    TAKE 2 TABLETS BY MOUTH AT BEDTIME    Bipolar I disorder, most recent episode (or current) manic (H)       * QUEtiapine 50 MG Tb24 24 hr tablet    SEROquel XR    60 each    Take 6 tablets night at for 3 days then decrease down to 4 tablets night for 3 days then down to 3 tablets every night    Bipolar I disorder (H)       * Notice:  This list has 2 medication(s) that are the same as other medications prescribed for you. Read the directions carefully, and ask your doctor or other care provider to review them with you.

## 2017-12-05 ASSESSMENT — ANXIETY QUESTIONNAIRES: GAD7 TOTAL SCORE: 17

## 2017-12-18 ENCOUNTER — OFFICE VISIT (OUTPATIENT)
Dept: FAMILY MEDICINE | Facility: OTHER | Age: 37
End: 2017-12-18
Attending: FAMILY MEDICINE
Payer: COMMERCIAL

## 2017-12-18 VITALS
HEIGHT: 73 IN | SYSTOLIC BLOOD PRESSURE: 102 MMHG | WEIGHT: 205 LBS | TEMPERATURE: 99.2 F | BODY MASS INDEX: 27.17 KG/M2 | OXYGEN SATURATION: 94 % | DIASTOLIC BLOOD PRESSURE: 58 MMHG | HEART RATE: 92 BPM

## 2017-12-18 DIAGNOSIS — Z72.0 TOBACCO ABUSE: ICD-10-CM

## 2017-12-18 DIAGNOSIS — J01.00 ACUTE MAXILLARY SINUSITIS, RECURRENCE NOT SPECIFIED: ICD-10-CM

## 2017-12-18 DIAGNOSIS — B34.9 VIRAL SYNDROME: Primary | ICD-10-CM

## 2017-12-18 DIAGNOSIS — Z71.6 TOBACCO ABUSE COUNSELING: ICD-10-CM

## 2017-12-18 LAB
ANION GAP SERPL CALCULATED.3IONS-SCNC: 7 MMOL/L (ref 3–14)
BASOPHILS # BLD AUTO: 0 10E9/L (ref 0–0.2)
BASOPHILS NFR BLD AUTO: 0.2 %
BUN SERPL-MCNC: 6 MG/DL (ref 7–30)
CALCIUM SERPL-MCNC: 9.3 MG/DL (ref 8.5–10.1)
CHLORIDE SERPL-SCNC: 103 MMOL/L (ref 94–109)
CO2 SERPL-SCNC: 30 MMOL/L (ref 20–32)
CREAT SERPL-MCNC: 0.96 MG/DL (ref 0.66–1.25)
DEPRECATED S PYO AG THROAT QL EIA: NORMAL
DIFFERENTIAL METHOD BLD: ABNORMAL
EOSINOPHIL # BLD AUTO: 0.1 10E9/L (ref 0–0.7)
EOSINOPHIL NFR BLD AUTO: 0.7 %
ERYTHROCYTE [DISTWIDTH] IN BLOOD BY AUTOMATED COUNT: 12.7 % (ref 10–15)
GFR SERPL CREATININE-BSD FRML MDRD: 89 ML/MIN/1.7M2
GLUCOSE SERPL-MCNC: 93 MG/DL (ref 70–99)
HCT VFR BLD AUTO: 45.5 % (ref 40–53)
HGB BLD-MCNC: 16 G/DL (ref 13.3–17.7)
LYMPHOCYTES # BLD AUTO: 2.9 10E9/L (ref 0.8–5.3)
LYMPHOCYTES NFR BLD AUTO: 18 %
MCH RBC QN AUTO: 32.8 PG (ref 26.5–33)
MCHC RBC AUTO-ENTMCNC: 35.2 G/DL (ref 31.5–36.5)
MCV RBC AUTO: 93 FL (ref 78–100)
MONOCYTES # BLD AUTO: 0.9 10E9/L (ref 0–1.3)
MONOCYTES NFR BLD AUTO: 5.4 %
NEUTROPHILS # BLD AUTO: 12.2 10E9/L (ref 1.6–8.3)
NEUTROPHILS NFR BLD AUTO: 75.7 %
PLATELET # BLD AUTO: 242 10E9/L (ref 150–450)
POTASSIUM SERPL-SCNC: 4 MMOL/L (ref 3.4–5.3)
RBC # BLD AUTO: 4.88 10E12/L (ref 4.4–5.9)
SODIUM SERPL-SCNC: 140 MMOL/L (ref 133–144)
SPECIMEN SOURCE: NORMAL
WBC # BLD AUTO: 16.1 10E9/L (ref 4–11)

## 2017-12-18 PROCEDURE — 80048 BASIC METABOLIC PNL TOTAL CA: CPT | Mod: ZL | Performed by: FAMILY MEDICINE

## 2017-12-18 PROCEDURE — 36415 COLL VENOUS BLD VENIPUNCTURE: CPT | Mod: ZL | Performed by: FAMILY MEDICINE

## 2017-12-18 PROCEDURE — 99213 OFFICE O/P EST LOW 20 MIN: CPT | Performed by: FAMILY MEDICINE

## 2017-12-18 PROCEDURE — 87880 STREP A ASSAY W/OPTIC: CPT | Mod: ZL | Performed by: FAMILY MEDICINE

## 2017-12-18 PROCEDURE — 87081 CULTURE SCREEN ONLY: CPT | Mod: ZL | Performed by: FAMILY MEDICINE

## 2017-12-18 PROCEDURE — 99212 OFFICE O/P EST SF 10 MIN: CPT

## 2017-12-18 PROCEDURE — 85025 COMPLETE CBC W/AUTO DIFF WBC: CPT | Mod: ZL | Performed by: FAMILY MEDICINE

## 2017-12-18 ASSESSMENT — ANXIETY QUESTIONNAIRES
3. WORRYING TOO MUCH ABOUT DIFFERENT THINGS: MORE THAN HALF THE DAYS
2. NOT BEING ABLE TO STOP OR CONTROL WORRYING: MORE THAN HALF THE DAYS
6. BECOMING EASILY ANNOYED OR IRRITABLE: MORE THAN HALF THE DAYS
7. FEELING AFRAID AS IF SOMETHING AWFUL MIGHT HAPPEN: MORE THAN HALF THE DAYS
GAD7 TOTAL SCORE: 16
4. TROUBLE RELAXING: NEARLY EVERY DAY
5. BEING SO RESTLESS THAT IT IS HARD TO SIT STILL: NEARLY EVERY DAY
1. FEELING NERVOUS, ANXIOUS, OR ON EDGE: MORE THAN HALF THE DAYS

## 2017-12-18 ASSESSMENT — PATIENT HEALTH QUESTIONNAIRE - PHQ9: SUM OF ALL RESPONSES TO PHQ QUESTIONS 1-9: 12

## 2017-12-18 ASSESSMENT — PAIN SCALES - GENERAL: PAINLEVEL: WORST PAIN (10)

## 2017-12-18 NOTE — PATIENT INSTRUCTIONS

## 2017-12-18 NOTE — MR AVS SNAPSHOT
After Visit Summary   12/18/2017    Jani Langford    MRN: [id number]           Patient Information     Date Of Birth          1980        Visit Information        Provider Department      12/18/2017 1:30 PM Selwyn Lopez MD [facility]        Today's Diagnoses     Viral syndrome    -  1    Tobacco abuse        Tobacco abuse counseling        Acute maxillary sinusitis, recurrence not specified          Care Instructions      HOW TO QUIT SMOKING  Smoking is one of the hardest habits to break. About half of all those who have ever smoked have been able to quit, and most of those (about 70%) who still smoke want to quit. Here are some of the best ways to stop smoking.     KEEP TRYING:  It takes most smokers about 8 tries before they are finally able to fully quit. So, the more often you try and fail, the better your chance of quitting the next time! So, don't give up!    GO COLD TURKEY:  Most ex-smokers quit cold turkey. Trying to cut back gradually doesn't seem to work as well, perhaps because it continues the smoking habit. Also, it is possible to fool yourself by inhaling more while smoking fewer cigarettes. This results in the same amount of nicotine in your body!    GET SUPPORT:  Support programs can make an important difference, especially for the heavy smoker. These groups offer lectures, methods to change your behavior and peer support. Call the free national Quitline for more information. 800-QUIT-NOW (449-498-3952). Low-cost or free programs are offered by many hospitals, local chapters of the American Lung Association (395-046-7394) and the American Cancer Society (398-794-5190). Support at home is important too. Non-smokers can help by offering praise and encouragement. If the smoker fails to quit, encourage them to try again!    OVER-THE-COUNTER MEDICINES:  For those who can't quit on their own, Nicotine Replacement Therapy (NRT) may make quitting much easier. Certain  aids such as the nicotine patch, gum and lozenge are available without a prescription. However, it is best to use these under the guidance of your doctor. The skin patch provides a steady supply of nicotine to the body. Nicotine gum and lozenge gives temporary bursts of low levels of nicotine. Both methods take the edge off the craving for cigarettes. WARNING: If you feel symptoms of nicotine overdose, such as nausea, vomiting, dizziness, weakness, or fast heartbeat, stop using these and see your doctor.    PRESCRIPTION MEDICINES:  After evaluating your smoking patterns and prior attempts at quitting, your doctor may offer a prescription medicine such as bupropion (Zyban, Wellbutrin), varenicline (Chantix, Champix), a niocotine inhaler or nasal spray. Each has its unique advantage and side effects which your doctor can review with you.    HEALTH BENEFITS OF QUITTING:  The benefits of quitting start right away and keep improving the longer you go without smokin minutes: blood pressure and pulse return to normal  8 hours: oxygen levels return to normal  2 days: ability to smell and taste begins to improve as damaged nerves start to regrow  2-3 weeks: circulation and lung function improves  1-9 months: decreased cough, congestion and shortness of breath; less tired  1 year: risk of heart attack decreases by half  5 years: risk of lung cancer decreases by half; risk of stroke becomes the same as a non-smoker  For information about how to quit smoking, visit the following links:  [employer] ,   Clearing the Air, Quit Smoking Today   - an online booklet. http://www.smokefree.gov/pubs/clearing_the_air.pdf  Smokefree.gov http://smokefree.gov/  QuitNet http://www.quitnet.com/    6990-6621 [employer], [address] [place] [address], [place] [address]. All rights reserved. This information is not intended as a substitute for professional medical care. Always follow your healthcare professional's  instructions.    The Benefits of Living Smoke Free  What do you want to gain from quitting? Check off some reasons to quit.  Health Benefits  ___ Reduce my risk of lung cancer, heart disease, chronic lung disease  ___ Have fewer wrinkles and softer skin  ___ Improve my sense of taste and smell  ___ For pregnant women--reduce the risk of having a miscarriage, stillbirth, premature birth, or low-birth-weight baby  Personal Benefits  ___ Feel more in control of my life  ___ Have better-smelling hair, breath, clothes, home, and car  ___ Save time by not having to take smoke breaks, buy cigarettes, or hunt for a light  ___ Have whiter teeth  Family Benefits  ___ Reduce my children s respiratory tract infections  ___ Set a good example for my children  ___ Reduce my family s cancer risk  Financial Benefits  ___ Save hundreds of dollars each year that would be spent on cigarettes  ___ Save money on medical bills  ___ Save on life, health, and car insurance premiums    Those Dollars Add Up!  Cigarettes are expensive, and getting more expensive all the time. Do you realize how much money you are spending on cigarettes per year? What is the average amount you spend on a pack of cigarettes? What is the average number of packs that you smoke per day? Using your answers to these questions, fill in this formula to help you find out:  ($ _____ per pack) ×  ( _____ number of packs per day) × (365 days) =  $ _____ yearly cost of smoking  Besides tobacco, there are other costs, including extra cleaning bills and replacement costs for clothing and furniture; medical expenses for smoking-related illnesses; and higher health, life, and car insurance premiums.    Cigars and Pipes Count Too!  Cigars and pipes are also dangerous. So are smokeless (chewing) tobacco and snuff. All of these products contain nicotine, a highly addictive substance that has harmful effects on your body. Quitting smoking means giving up all tobacco products.       "0690-9340 [facility], [address] [place] [address], [place] [address]. All rights reserved. This information is not intended as a substitute for professional medical care. Always follow your healthcare professional's instructions.          Follow-ups after your visit        Your next 10 appointments already scheduled     Jan 08, 2018  9:00 AM CST   (Arrive by 8:45 AM)   Return Visit with Renee Vuong MD   [facility] ([facility] [place] )    [address] [place] [address]  [place] [address] [phone]              Who to contact     If you have questions or need follow up information about today's clinic visit or your schedule please contact [facility] directly at [phone].  Normal or non-critical lab and imaging results will be communicated to you by MyChart, letter or phone within 4 business days after the clinic has received the results. If you do not hear from us within 7 days, please contact the clinic through MyChart or phone. If you have a critical or abnormal lab result, we will notify you by phone as soon as possible.  Submit refill requests through [employer] or call your pharmacy and they will forward the refill request to us. Please allow 3 business days for your refill to be completed.          Additional Information About Your Visit        Care EveryWhere ID     This is your Care EveryWhere ID. This could be used by other organizations to access your [facility] medical records  [id number]        Your Vitals Were     Pulse Temperature Height Pulse Oximetry BMI (Body Mass Index)       92 99.2  F (37.3  C) 6' 0.75\" (1.848 m) 94% 27.23 kg/m2        Blood Pressure from Last 3 Encounters:   12/18/17 102/58   12/04/17 112/70   11/22/17 94/62    Weight from Last 3 Encounters:   12/18/17 205 lb (93 kg)   12/04/17 207 lb (93.9 kg)   11/22/17 207 lb (93.9 kg)              We Performed the Following     Basic metabolic panel     Beta strep group A culture     CBC with " platelets and differential     Rapid strep screen     Tobacco Cessation - Order to Satisfy Health Maintenance          Today's Medication Changes          These changes are accurate as of: 12/18/17  1:51 PM.  If you have any questions, ask your nurse or doctor.               Start taking these medicines.        Dose/Directions    amoxicillin-clavulanate 875-125 MG per tablet   Commonly known as:  AUGMENTIN   Used for:  Acute maxillary sinusitis, recurrence not specified   Started by:  Selwyn Lopez MD        Dose:  1 tablet   Take 1 tablet by mouth 2 times daily   Quantity:  20 tablet   Refills:  0         These medicines have changed or have updated prescriptions.        Dose/Directions    ARIPiprazole 10 MG tablet   Commonly known as:  ABILIFY   This may have changed:  Another medication with the same name was removed. Continue taking this medication, and follow the directions you see here.   Used for:  Bipolar I disorder (H)   Changed by:  Renee Vuong MD        Dose:  10 mg   Take 1 tablet (10 mg) by mouth daily * start once down to 150 mg of quetiapine   Quantity:  30 tablet   Refills:  3            Where to get your medicines      These medications were sent to [id number] - [place], [place] - [address] [id number] [facility] [address]     Phone:  [phone]     amoxicillin-clavulanate 875-125 MG per tablet                Primary Care Provider Office Phone # Fax #    R Wilmer Mendez [phone]       [facility] [place] [address] [place], [place] [address] [address]        Equal Access to Services     [facility] AH: Hadii aad ku hadasho Soomaali, waaxda luqadaha, qaybta kaalmada adeegyada, waxay idiin hayirina mcleod . So [facility] [phone].    ATENCIÓN: Si habla español, tiene a davis disposición servicios gratuitos de asistencia lingüística. Llame al [phone].    We comply with applicable federal civil rights laws and [place] laws. We do not  discriminate on the basis of race, color, national origin, age, disability, sex, sexual orientation, or gender identity.            Thank you!     Thank you for choosing [facility]  for your care. Our goal is always to provide you with excellent care. Hearing back from our patients is one way we can continue to improve our services. Please take a few minutes to complete the written survey that you may receive in the mail after your visit with us. Thank you!             Your Updated Medication List - Protect others around you: Learn how to safely use, store and throw away your medicines at www.disposemymeds.org.          This list is accurate as of: 12/18/17  1:51 PM.  Always use your most recent med list.                   Brand Name Dispense Instructions for use Diagnosis    albuterol 108 (90 BASE) MCG/ACT Inhaler    PROAIR HFA/PROVENTIL HFA/VENTOLIN HFA    1 Inhaler    Inhale 2 puffs into the lungs every 6 hours as needed for shortness of breath / dyspnea or wheezing    Dyspnea, unspecified type       amoxicillin-clavulanate 875-125 MG per tablet    AUGMENTIN    20 tablet    Take 1 tablet by mouth 2 times daily    Acute maxillary sinusitis, recurrence not specified       ARIPiprazole 10 MG tablet    ABILIFY    30 tablet    Take 1 tablet (10 mg) by mouth daily * start once down to 150 mg of quetiapine    Bipolar I disorder (H)       clonazePAM 0.5 MG tablet    klonoPIN    30 tablet    Take 1 tablet (0.5 mg) by mouth daily as needed for anxiety    Bipolar I disorder (H)       omeprazole 20 MG CR capsule    priLOSEC    30 capsule    Take 1 capsule (20 mg) by mouth daily    Gastroesophageal reflux disease without esophagitis       * QUEtiapine 300 MG 24 hr tablet    SEROquel XR    60 tablet    TAKE 2 TABLETS BY MOUTH AT BEDTIME    Bipolar I disorder, most recent episode (or current) manic (H)       * QUEtiapine 50 MG Tb24 24 hr tablet    SEROquel XR    60 each    Take 6 tablets night at for 3 days then  decrease down to 4 tablets night for 3 days then down to 3 tablets every night    Bipolar I disorder (H)       * Notice:  This list has 2 medication(s) that are the same as other medications prescribed for you. Read the directions carefully, and ask your doctor or other care provider to review them with you.

## 2017-12-18 NOTE — LETTER
December 19, 2017      Jani Langford  [address] [place] [address]        Dear ,    We are writing to inform you of your test results.    Your test results fall within the expected ranges. WBC (white blood cell count) is elevated due to illness. Please continue with current treatment plan.    Resulted Orders   CBC with platelets and differential   Result Value Ref Range    WBC 16.1 (H) 4.0 - 11.0 10e9/L    RBC Count 4.88 4.4 - 5.9 10e12/L    Hemoglobin 16.0 13.3 - 17.7 g/dL    Hematocrit 45.5 40.0 - 53.0 %    MCV 93 78 - 100 fl    MCH 32.8 26.5 - 33.0 pg    MCHC 35.2 31.5 - 36.5 g/dL    RDW 12.7 10.0 - 15.0 %    Platelet Count 242 150 - 450 10e9/L    Diff Method Automated Method     % Neutrophils 75.7 %    % Lymphocytes 18.0 %    % Monocytes 5.4 %    % Eosinophils 0.7 %    % Basophils 0.2 %    Absolute Neutrophil 12.2 (H) 1.6 - 8.3 10e9/L    Absolute Lymphocytes 2.9 0.8 - 5.3 10e9/L    Absolute Monocytes 0.9 0.0 - 1.3 10e9/L    Absolute Eosinophils 0.1 0.0 - 0.7 10e9/L    Absolute Basophils 0.0 0.0 - 0.2 10e9/L   Rapid strep screen   Result Value Ref Range    Specimen Description Throat     Rapid Strep A Screen       NEGATIVE: No Group A streptococcal antigen detected by immunoassay, await culture report.   Basic metabolic panel   Result Value Ref Range    Sodium 140 133 - 144 mmol/L    Potassium 4.0 3.4 - 5.3 mmol/L    Chloride 103 94 - 109 mmol/L    Carbon Dioxide 30 20 - 32 mmol/L    Anion Gap 7 3 - 14 mmol/L    Glucose 93 70 - 99 mg/dL    Urea Nitrogen 6 (L) 7 - 30 mg/dL    Creatinine 0.96 0.66 - 1.25 mg/dL    GFR Estimate 89 >60 mL/min/1.7m2      Comment:      Non  GFR Calc    GFR Estimate If Black >90 >60 mL/min/1.7m2      Comment:       GFR Calc    Calcium 9.3 8.5 - 10.1 mg/dL       If you have any questions or concerns, please call the clinic at the number listed above.       Sincerely,        Selwyn Lopez MD

## 2017-12-18 NOTE — PROGRESS NOTES
Jani Langford    December 18, 2017    Chief Complaint   Patient presents with     URI     nasal congestion, cough, fatigue, sore throat, sinus pressure, headaches, chills, vomiting x 1 week       SUBJECTIVE:  Vomiting about 3 to 4 times/day.  Coughing and having very ST as well.  This has been about a week.  He is transitioning on his meds, weening seroquel and increasing abilify.   Having URI with cough and now purulent sputum and sinus pain and pressure.      History reviewed. No pertinent past medical history.    Past Surgical History:   Procedure Laterality Date     DENTAL SURGERY      pulled 2 teeth month ago     ENT SURGERY      sinus       Current Outpatient Prescriptions   Medication Sig Dispense Refill     QUEtiapine (SEROQUEL XR) 50 MG TB24 24 hr tablet Take 6 tablets night at for 3 days then decrease down to 4 tablets night for 3 days then down to 3 tablets every night 60 each 3     clonazePAM (KLONOPIN) 0.5 MG tablet Take 1 tablet (0.5 mg) by mouth daily as needed for anxiety 30 tablet 3     ARIPiprazole (ABILIFY) 10 MG tablet Take 1 tablet (10 mg) by mouth daily * start once down to 150 mg of quetiapine 30 tablet 3     QUEtiapine (SEROQUEL XR) 300 MG 24 hr tablet TAKE 2 TABLETS BY MOUTH AT BEDTIME 60 tablet 11     albuterol (PROAIR HFA/PROVENTIL HFA/VENTOLIN HFA) 108 (90 BASE) MCG/ACT Inhaler Inhale 2 puffs into the lungs every 6 hours as needed for shortness of breath / dyspnea or wheezing 1 Inhaler 1     omeprazole (PRILOSEC) 20 MG CR capsule Take 1 capsule (20 mg) by mouth daily 30 capsule 3     [DISCONTINUED] ARIPiprazole (ABILIFY) 5 MG tablet Take 1 tablet (5 mg) by mouth daily 30 tablet 3       Allergies   Allergen Reactions     Bee Venom      Latex Hives     Percocet [Oxycodone-Acetaminophen]      Short of breath vomiting     Toradol      seizure     Tramadol      Short of breath and vomiting     Tegretol [Carbamazepine] Rash       Family History   Problem Relation Age of Onset     MENTAL  ILLNESS Mother      Depression Mother        Social History     Social History     Marital status: Single     Spouse name: N/A     Number of children: N/A     Years of education: N/A     Occupational History     Not on file.     Social History Main Topics     Smoking status: Current Every Day Smoker     Packs/day: 0.75     Smokeless tobacco: Never Used      Comment: trying to quit     Alcohol use Yes      Comment: rare     Drug use: Yes     Special: Marijuana     Sexual activity: Not on file     Other Topics Concern     Parent/Sibling W/ Cabg, Mi Or Angioplasty Before 65f 55m? No     Social History Narrative       5 point ROS negative except as noted above in HPI, including Gen., Resp., CV, GI &  system review.     OBJECTIVE:  B/P: 102/58, Temperature: 99.2, Pulse: 92, Respirations: Data Unavailable    GENERAL APPEARANCE: Alert, no acute distress  CV: regular rate and rhythm, no murmur, rub or gallop  RESP: lungs clear to auscultation bilaterally  ABDOMEN: normal bowel sounds, soft, nontender, no hepatosplenomegaly or other masses  SKIN: no suspicious lesions or rashes to visualized skin  NEURO: Alert, oriented x 3, speech and mentation normal    ASSESSMENT and PLAN:  (B34.9) Viral syndrome  (primary encounter diagnosis)  Comment: both GI and respiratory elements.   Plan: CBC with platelets and differential, Rapid         strep screen, Basic metabolic panel, Beta strep        group A culture        Discussed.  Negative strep but wbc elevated some.  Sx c/w sinusitis.  Discussed and tx with augmentin.  We talked a bit about the viral onset but now the secondary bacterial infection.     (Z72.0) Tobacco abuse  Comment: recommend cessation.   Plan: Tobacco Cessation - Order to Satisfy Health         Maintenance        As above.     (Z71.6) Tobacco abuse counseling  Comment: as above.   Plan: as above.     (J01.00) Acute maxillary sinusitis, recurrence not specified  Comment: reviewed.   Plan: augmentin and f/u with  ongoing concerns.

## 2017-12-19 ASSESSMENT — ANXIETY QUESTIONNAIRES: GAD7 TOTAL SCORE: 16

## 2017-12-20 LAB
BACTERIA SPEC CULT: NORMAL
SPECIMEN SOURCE: NORMAL

## 2018-01-08 ENCOUNTER — OFFICE VISIT (OUTPATIENT)
Dept: PSYCHIATRY | Facility: OTHER | Age: 38
End: 2018-01-08
Attending: PSYCHIATRY & NEUROLOGY
Payer: COMMERCIAL

## 2018-01-08 VITALS
BODY MASS INDEX: 27.9 KG/M2 | TEMPERATURE: 98.1 F | DIASTOLIC BLOOD PRESSURE: 60 MMHG | HEART RATE: 87 BPM | WEIGHT: 210 LBS | SYSTOLIC BLOOD PRESSURE: 116 MMHG

## 2018-01-08 DIAGNOSIS — F31.9 BIPOLAR I DISORDER (H): ICD-10-CM

## 2018-01-08 PROCEDURE — G0463 HOSPITAL OUTPT CLINIC VISIT: HCPCS

## 2018-01-08 PROCEDURE — 99214 OFFICE O/P EST MOD 30 MIN: CPT | Performed by: PSYCHIATRY & NEUROLOGY

## 2018-01-08 RX ORDER — QUETIAPINE FUMARATE 50 MG/1
50 TABLET, EXTENDED RELEASE ORAL AT BEDTIME
Qty: 30 EACH | Refills: 3 | Status: SHIPPED | OUTPATIENT
Start: 2018-01-08 | End: 2018-02-09

## 2018-01-08 RX ORDER — CLONAZEPAM 1 MG/1
1 TABLET ORAL AT BEDTIME
Qty: 30 TABLET | Refills: 1 | Status: SHIPPED | OUTPATIENT
Start: 2018-01-08 | End: 2018-02-28

## 2018-01-08 ASSESSMENT — ANXIETY QUESTIONNAIRES
2. NOT BEING ABLE TO STOP OR CONTROL WORRYING: SEVERAL DAYS
5. BEING SO RESTLESS THAT IT IS HARD TO SIT STILL: MORE THAN HALF THE DAYS
GAD7 TOTAL SCORE: 7
7. FEELING AFRAID AS IF SOMETHING AWFUL MIGHT HAPPEN: NOT AT ALL
1. FEELING NERVOUS, ANXIOUS, OR ON EDGE: SEVERAL DAYS
IF YOU CHECKED OFF ANY PROBLEMS ON THIS QUESTIONNAIRE, HOW DIFFICULT HAVE THESE PROBLEMS MADE IT FOR YOU TO DO YOUR WORK, TAKE CARE OF THINGS AT HOME, OR GET ALONG WITH OTHER PEOPLE: SOMEWHAT DIFFICULT
6. BECOMING EASILY ANNOYED OR IRRITABLE: NOT AT ALL
3. WORRYING TOO MUCH ABOUT DIFFERENT THINGS: SEVERAL DAYS

## 2018-01-08 ASSESSMENT — PATIENT HEALTH QUESTIONNAIRE - PHQ9
5. POOR APPETITE OR OVEREATING: MORE THAN HALF THE DAYS
SUM OF ALL RESPONSES TO PHQ QUESTIONS 1-9: 10

## 2018-01-08 ASSESSMENT — PAIN SCALES - GENERAL: PAINLEVEL: NO PAIN (0)

## 2018-01-08 NOTE — NURSING NOTE
"Chief Complaint   Patient presents with     RECHECK     Mental health.  Patient reports an improvement with symptoms.  More energy.  Sleep is still an issue, but is better quality       Initial /60 (BP Location: Left arm, Patient Position: Sitting, Cuff Size: Adult Regular)  Pulse 87  Temp 98.1  F (36.7  C) (Tympanic)  Wt 210 lb (95.3 kg)  BMI 27.9 kg/m2 Estimated body mass index is 27.9 kg/(m^2) as calculated from the following:    Height as of 12/18/17: 6' 0.75\" (1.848 m).    Weight as of this encounter: 210 lb (95.3 kg).  Medication Reconciliation: complete     KIERAN GARCIA      "

## 2018-01-08 NOTE — PROGRESS NOTES
"  PSYCHIATRY CLINIC PROGRESS NOTE   20 minute medication management, more than 50% of time spent counseling patient on medications, medication side effects, symptom history and management   SUBJECTIVE / INTERIM HISTORY                                                                        Social- with ISABEL Sandhu and her kids Children- Phoebe's kids are 5, 7 , and 12 yo  Last visit 12/4/17: Seroquel is currently 400: we will go down to 300 for 3 days then down to 200 mg for 3 days; then down to 150 mg daily. Once at 150 mg of Seroquel you can increase the Ability from 5 mg to 10 mg. Also we will have you try Klonopin 0.5 mg while we are making this switch for sleep.    - generally doing pretty good with switch over to Abilify. Is waking up 1-2 times per night which for him is GOOD. He actually feels refreshed when wakes up. When he wakes up he can go back to sleep.  - Klonopin is helping him more with mood , anxieyt whereas in past when he took Xanax which \"I'd be sleeping in half hour\"  - LESS of paranoia \"It's like I'm able to shrug it off more\"  - more energy: Phoebe notes \"you have a will to do stuff\"  - we reviewed triglycerides / labs from May '17 and I noted very likely Seroquel contributing to the elevated lipids. Jani noted he is trying to eat better and for example reducing portion sizes, not eating big heavy brunches like he used to.   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  - aggressive in past while manic.  SUBSTANCE USE- smokes 1/2 PPD since age 15 yo.  past use  Meth will be year in about Kitty clean. , MJ. tx [facility] and [facility]. EtOH now rare.    SYMPTOMS-  Insomnia still but is better. Paranoia has improved. No manic sx.  nightmares, flashbacks, detached, angry outbursts, self-destructive, startle response, hypervigilance and fear Sleep now: about 2-3 hours then up for awhile, then another hour. His goal would be 6 continuous.  MEDICAL ROS- loss off appetite.  SOB: " "usually at night. But sometimes during day as well not even with exertion.  increased appetite / weight  MEDICAL / SURGICAL HISTORY                   Patient Active Problem List   Diagnosis     Bipolar disorder with psychotic features (H)     Insomnia     History of mental disorder     Attention deficit disorder     Posttraumatic stress disorder     Decreased sex drive     Gastroesophageal reflux disease without esophagitis     ALLERGY   Bee venom; Latex; Percocet [oxycodone-acetaminophen]; Toradol; Tramadol; and Tegretol [carbamazepine]  MEDICATIONS                                                                                             Current Outpatient Prescriptions   Medication Sig     QUEtiapine (SEROQUEL XR) 50 MG TB24 24 hr tablet Take 6 tablets night at for 3 days then decrease down to 4 tablets night for 3 days then down to 3 tablets every night     clonazePAM (KLONOPIN) 0.5 MG tablet Take 1 tablet (0.5 mg) by mouth daily as needed for anxiety     ARIPiprazole (ABILIFY) 10 MG tablet Take 1 tablet (10 mg) by mouth daily * start once down to 150 mg of quetiapine     albuterol (PROAIR HFA/PROVENTIL HFA/VENTOLIN HFA) 108 (90 BASE) MCG/ACT Inhaler Inhale 2 puffs into the lungs every 6 hours as needed for shortness of breath / dyspnea or wheezing     omeprazole (PRILOSEC) 20 MG CR capsule Take 1 capsule (20 mg) by mouth daily     [DISCONTINUED] QUEtiapine (SEROQUEL XR) 300 MG 24 hr tablet TAKE 2 TABLETS BY MOUTH AT BEDTIME     No current facility-administered medications for this visit.          PAST MEDICATION TRIALS    Prozac (fluoxetine), Zoloft (sertraline), Paxil (paroxetine) and Cymbalta (duloxetine). Paxil sexual SEs and constipation. Mirtazapine ineffective for insomnia.   Elavil (amitriptyline).   Lithium Carbonate, Depakote and Depakot ER (valproate) and Neurontin. Topamax didn't help (gabapentin). Lithium \"that was like eating pennies and nickels\" but helped / effective. Didn't like the blood level " draws with lithium. Doesn't think has been on tegretol or trileptal.   Seroquel (quetiapine).   no older antipsychotics.   Xanax (alprazolam). Doesn't think has been on Buspar   Ambien (zolpidem) and Lunesta (eszopiclone). Lunesta helped initial insomnia, NOT middle. Ambien made him violent and agitated.       VITALS   /60  Pulse 87  Temp 98.1  F (36.7  C) (Tympanic)  Wt 210 lb (95.3 kg)  BMI 27.9 kg/m2     PHQ9                     [unfilled]  LABS                                                                                                                           Recent Labs   Lab Test  11/22/17   0914  05/08/17   0919   CHOL  216*  258*   HDL  25*  27*   LDL  132*  Cannot estimate LDL when triglyceride exceeds 400 mg/dL   TRIG  295*  403*     Glucose 126 11/22/17 and was 97 5/8/17.      ms (on Seroquel) March 2017    MENTAL STATUS EXAM                                                                                        Alert. Oriented to person, place, and date / time. Casually groomed, calm, cooperative with good eye contact. No problems with speech or psychomotor behavior. Mood was described as tired and affect was congruent to speech content and full range. Thought process, including associations, was unremarkable and thought content was devoid of suicidal and homicidal ideation and psychotic thought. No hallucinations. Insight was good. Judgment was intact and adequate for safety. Fund of knowledge was intact. Pt demonstrates no obvious problems with attention, concentration, language, recent or remote memory although these were not formally tested.     ASSESSMENT                                                                                                      HISTORICAL:  Initial psych note 9/14/16        NOTES:  Depakote in past didn't like it. Lithium in past metallic past but did work: doesn't want to go on it again.  Jani Langford is a 38 yo male with history of bipolar  disorder, PTSD and chem dep - on Seroquel total 700 mg and been on for awhile. Main issue is sleep : has not had luck with sleep meds so discussed I will certainly try but given he has not found past meds effective more unlikely we are going to find med that works. We tried Remeron with no luck. We tried Topamax and no luck. He saw Dr. Garcia  and started on Mirapex. Last visit we were going to try Doxepin but insurance wouldn't cover. We tried Belsomra and didn't ehlp.      This is definitely a risk vs. Benefit of medicaton change. Thus far we are both happy with how the change from Seroquel to Abilify is going. Sleep is actually gettig better, more freshed, more ambition, less paranoia. No manic sx. I'm fine with keeping Klonopin for now and we will go with 1 mg HS. We will try for another month of Seroquel at 52 and then d/c. I'd much prefer stick with one antipyschotic but need be he is doing so much better than he used to that I woule be okay if we end up with 50 to 100 mg and Abilify.      TREATMENT RISK STATEMENT:  The risks, benefits, alternatives and potential adverse effects have been explained and are understood by the pt.  The pt agrees to the treatment plan with the ability to do so.   The pt knows to call the clinic for any problems or access emergency care if needed.        DIAGNOSES                     Bipolar I disorder with history of psychotic features               PTSD         PLAN                                                                                                                    1)  MEDICATIONS:         -- Klonopin we will change to 1 mg tab HS. We will go down from Seroquel 100 mg to 50 mg HS for 1 month then discontinue. Continue Abilify 10 mg daily. They know call me if feel need stick on seroquel longer.      2)  THERAPY:  No Change    3)  LABS:  lipid panel and glucose, LFTs, BMP including fasting glucose were done in May '17 and then in nov '17 as was glucose. EKG  3/2017    4)  PT MONITOR [call for probs]:  Worsening symptoms, SI/HI, SEs from meds    5)  REFERRALS [CD, medical, other]: none    6)  RTC: 1 month

## 2018-01-09 ASSESSMENT — ANXIETY QUESTIONNAIRES: GAD7 TOTAL SCORE: 7

## 2018-02-09 ENCOUNTER — OFFICE VISIT (OUTPATIENT)
Dept: PSYCHIATRY | Facility: OTHER | Age: 38
End: 2018-02-09
Attending: PSYCHIATRY & NEUROLOGY
Payer: COMMERCIAL

## 2018-02-09 VITALS
HEART RATE: 84 BPM | SYSTOLIC BLOOD PRESSURE: 128 MMHG | DIASTOLIC BLOOD PRESSURE: 68 MMHG | WEIGHT: 210 LBS | TEMPERATURE: 98.1 F | BODY MASS INDEX: 27.9 KG/M2

## 2018-02-09 DIAGNOSIS — G25.71 AKATHISIA: Primary | ICD-10-CM

## 2018-02-09 PROCEDURE — G0463 HOSPITAL OUTPT CLINIC VISIT: HCPCS

## 2018-02-09 PROCEDURE — 99214 OFFICE O/P EST MOD 30 MIN: CPT | Performed by: PSYCHIATRY & NEUROLOGY

## 2018-02-09 RX ORDER — PROPRANOLOL HYDROCHLORIDE 10 MG/1
10 TABLET ORAL 2 TIMES DAILY
Qty: 60 TABLET | Refills: 3 | Status: SHIPPED | OUTPATIENT
Start: 2018-02-09 | End: 2018-02-28 | Stop reason: DRUGHIGH

## 2018-02-09 ASSESSMENT — PATIENT HEALTH QUESTIONNAIRE - PHQ9: 5. POOR APPETITE OR OVEREATING: NEARLY EVERY DAY

## 2018-02-09 ASSESSMENT — ANXIETY QUESTIONNAIRES
5. BEING SO RESTLESS THAT IT IS HARD TO SIT STILL: NEARLY EVERY DAY
GAD7 TOTAL SCORE: 11
3. WORRYING TOO MUCH ABOUT DIFFERENT THINGS: MORE THAN HALF THE DAYS
2. NOT BEING ABLE TO STOP OR CONTROL WORRYING: SEVERAL DAYS
6. BECOMING EASILY ANNOYED OR IRRITABLE: SEVERAL DAYS
7. FEELING AFRAID AS IF SOMETHING AWFUL MIGHT HAPPEN: NOT AT ALL
IF YOU CHECKED OFF ANY PROBLEMS ON THIS QUESTIONNAIRE, HOW DIFFICULT HAVE THESE PROBLEMS MADE IT FOR YOU TO DO YOUR WORK, TAKE CARE OF THINGS AT HOME, OR GET ALONG WITH OTHER PEOPLE: VERY DIFFICULT
1. FEELING NERVOUS, ANXIOUS, OR ON EDGE: SEVERAL DAYS

## 2018-02-09 ASSESSMENT — PAIN SCALES - GENERAL: PAINLEVEL: NO PAIN (0)

## 2018-02-09 NOTE — MR AVS SNAPSHOT
After Visit Summary   2/9/2018    Jani Langford    MRN: [id number]           Patient Information     Date Of Birth          1980        Visit Information        Provider Department      2/9/2018 8:40 AM Renee Vuong MD [facility]        Today's Diagnoses     Akathisia    -  1       Follow-ups after your visit        Your next 10 appointments already scheduled     Feb 28, 2018  1:40 PM CST   (Arrive by 1:25 PM)   Return Visit with Renee Vuong MD   [facility] [place] ([facility] [place] )    [address]  [place] [id number] [phone]              Who to contact     If you have questions or need follow up information about today's clinic visit or your schedule please contact [facility] directly at [phone].  Normal or non-critical lab and imaging results will be communicated to you by MyChart, letter or phone within 4 business days after the clinic has received the results. If you do not hear from us within 7 days, please contact the clinic through MyChart or phone. If you have a critical or abnormal lab result, we will notify you by phone as soon as possible.  Submit refill requests through [employer] or call your pharmacy and they will forward the refill request to us. Please allow 3 business days for your refill to be completed.          Additional Information About Your Visit        Care EveryWhere ID     This is your Care EveryWhere ID. This could be used by other organizations to access your [place] medical records  [id number]        Your Vitals Were     Pulse Temperature BMI (Body Mass Index)             84 98.1  F (36.7  C) (Tympanic) 27.9 kg/m2          Blood Pressure from Last 3 Encounters:   02/09/18 128/68   01/08/18 116/60   12/18/17 102/58    Weight from Last 3 Encounters:   02/09/18 210 lb (95.3 kg)   01/08/18 210 lb (95.3 kg)   12/18/17 205 lb (93 kg)              Today, you had the following     No orders  found for display         Today's Medication Changes          These changes are accurate as of 2/9/18  9:25 AM.  If you have any questions, ask your nurse or doctor.               Start taking these medicines.        Dose/Directions    propranolol 10 MG tablet   Commonly known as:  INDERAL   Used for:  Akathisia   Started by:  Renee Vuong MD        Dose:  10 mg   Take 1 tablet (10 mg) by mouth 2 times daily   Quantity:  60 tablet   Refills:  3            Where to get your medicines      These medications were sent to [id number] - [place], [place] - [address]  [address] [facility] [address]     Phone:  [phone]     propranolol 10 MG tablet                Primary Care Provider Office Phone # Fax #    R Wilmer Mendez [phone]       [facility] [address] [place] [id number]        Equal Access to Services     [facility] : Hadii kalia lima hadasho Soomaali, waaxda luqadaha, qaybta kaalmada adeegyada, oh up hayirina mcleod . So [facility] [phone].    ATENCIÓN: Si habla español, tiene a davis disposición servicios gratuitos de asistencia lingüística. Adamame al [phone].    We comply with applicable federal civil rights laws and [place] laws. We do not discriminate on the basis of race, color, national origin, age, disability, sex, sexual orientation, or gender identity.            Thank you!     Thank you for choosing [facility]  for your care. Our goal is always to provide you with excellent care. Hearing back from our patients is one way we can continue to improve our services. Please take a few minutes to complete the written survey that you may receive in the mail after your visit with us. Thank you!             Your Updated Medication List - Protect others around you: Learn how to safely use, store and throw away your medicines at www.disposemymeds.org.          This list is accurate as of 2/9/18  9:25 AM.  Always  use your most recent med list.                   Brand Name Dispense Instructions for use Diagnosis    albuterol 108 (90 BASE) MCG/ACT Inhaler    PROAIR HFA/PROVENTIL HFA/VENTOLIN HFA    1 Inhaler    Inhale 2 puffs into the lungs every 6 hours as needed for shortness of breath / dyspnea or wheezing    Dyspnea, unspecified type       ARIPiprazole 10 MG tablet    ABILIFY    30 tablet    Take 1 tablet (10 mg) by mouth daily * start once down to 150 mg of quetiapine    Bipolar I disorder (H)       clonazePAM 1 MG tablet    klonoPIN    30 tablet    Take 1 tablet (1 mg) by mouth At Bedtime    Bipolar I disorder (H)       omeprazole 20 MG CR capsule    priLOSEC    30 capsule    Take 1 capsule (20 mg) by mouth daily    Gastroesophageal reflux disease without esophagitis       propranolol 10 MG tablet    INDERAL    60 tablet    Take 1 tablet (10 mg) by mouth 2 times daily    Akathisia

## 2018-02-09 NOTE — NURSING NOTE
"Chief Complaint   Patient presents with     RECHECK     Mental health.       Initial /68 (BP Location: Left arm, Patient Position: Sitting, Cuff Size: Adult Regular)  Pulse 84  Temp 98.1  F (36.7  C) (Tympanic)  Wt 210 lb (95.3 kg)  BMI 27.9 kg/m2 Estimated body mass index is 27.9 kg/(m^2) as calculated from the following:    Height as of 12/18/17: 6' 0.75\" (1.848 m).    Weight as of this encounter: 210 lb (95.3 kg).  Medication Reconciliation: complete     KIERAN GARCIA      "

## 2018-02-09 NOTE — PROGRESS NOTES
"  PSYCHIATRY CLINIC PROGRESS NOTE   20 minute medication management, more than 50% of time spent counseling patient on medications, medication side effects, symptom history and management   SUBJECTIVE / INTERIM HISTORY                                                                        Social- with ISABEL Sandhu and her kids Children- Phoebe's kids are 5, 7 , and 10 yo  Last visit 1/8/18:  Klonopin we will change to 1 mg tab HS. We will go down from Seroquel 100 mg to 50 mg HS for 1 month then discontinue. Continue Abilify 10 mg daily. They know call me if feel need stick on seroquel longer.    - Phoebe tells him he's been snappy, but \"not a lot\"  - sleep is rough: off 50 mg of the Seroquel  - lot of pacing, rocking back and forth  - questioning about keep abilify or not...  - RESTLESSNESS  - Klonopin is helping him more with mood , anxiety whereas in past when he took Xanax which \"I'd be sleeping in half hour\"  - has been dx PTSD in past: when was working with Bernabe Browning.....  - aggressive in past while manic.  SUBSTANCE USE- smokes 1/2 PPD since age 15 yo.  past use  Meth will be year in about [employer] clean. , MJ. tx [facility] and [facility]. EtOH now rare.    SYMPTOMS- Decreased appetite, increased energy  Insomnia still.  Paranoia has improved. No manic sx.  nightmares, flashbacks, detached, angry outbursts, self-destructive, startle response, hypervigilance and fear Sleep now: about 2-3 hours then up for awhile, then another hour. His goal would be 6 continuous.  MEDICAL ROS- loss off appetite.  SOB: usually at night. But sometimes during day as well not even with exertion.  increased appetite / weight  MEDICAL / SURGICAL HISTORY                   Patient Active Problem List   Diagnosis     Bipolar disorder with psychotic features (H)     Insomnia     History of mental disorder     Attention deficit disorder     Posttraumatic stress disorder     Decreased sex drive     Gastroesophageal reflux disease without " "esophagitis     ALLERGY   Bee venom; Latex; Percocet [oxycodone-acetaminophen]; Toradol; Tramadol; and Tegretol [carbamazepine]  MEDICATIONS                                                                                             Current Outpatient Prescriptions   Medication Sig     clonazePAM (KLONOPIN) 1 MG tablet Take 1 tablet (1 mg) by mouth At Bedtime     ARIPiprazole (ABILIFY) 10 MG tablet Take 1 tablet (10 mg) by mouth daily * start once down to 150 mg of quetiapine     albuterol (PROAIR HFA/PROVENTIL HFA/VENTOLIN HFA) 108 (90 BASE) MCG/ACT Inhaler Inhale 2 puffs into the lungs every 6 hours as needed for shortness of breath / dyspnea or wheezing     omeprazole (PRILOSEC) 20 MG CR capsule Take 1 capsule (20 mg) by mouth daily     No current facility-administered medications for this visit.          PAST MEDICATION TRIALS    Prozac (fluoxetine), Zoloft (sertraline), Paxil (paroxetine) and Cymbalta (duloxetine). Paxil sexual SEs and constipation. Mirtazapine ineffective for insomnia.   Elavil (amitriptyline).   Lithium Carbonate, Depakote and Depakot ER (valproate) and Neurontin. Topamax didn't help (gabapentin). Lithium \"that was like eating pennies and nickels\" but helped / effective. Didn't like the blood level draws with lithium. Doesn't think has been on tegretol or trileptal.   Seroquel (quetiapine).   no older antipsychotics.   Xanax (alprazolam). Doesn't think has been on Buspar   Ambien (zolpidem) and Lunesta (eszopiclone). Lunesta helped initial insomnia, NOT middle. Ambien made him violent and agitated.       VITALS   /68 (BP Location: Left arm, Patient Position: Sitting, Cuff Size: Adult Regular)  Pulse 84  Temp 98.1  F (36.7  C) (Tympanic)  Wt 210 lb (95.3 kg)  BMI 27.9 kg/m2     PHQ9                     [unfilled]  LABS                                                                                                                           Recent Labs   Lab Test  11/22/17   0914 " " 05/08/17   0919   CHOL  216*  258*   HDL  25*  27*   LDL  132*  Cannot estimate LDL when triglyceride exceeds 400 mg/dL   TRIG  295*  403*     Glucose 126 11/22/17 and was 97 5/8/17.      ms (on Seroquel) March 2017    MENTAL STATUS EXAM                                                                                        Alert. Oriented to person, place, and date / time. Casually groomed, calm, cooperative with good eye contact. No problems with speech. Psychomotor: fidgety, rocking back and forth. Mood was described as \"kind of amped up\" and affect was congruent to speech content and full range. Thought process, including associations, was unremarkable and thought content was devoid of suicidal and homicidal ideation and psychotic thought. No hallucinations. Insight was good. Judgment was intact and adequate for safety. Fund of knowledge was intact. Pt demonstrates no obvious problems with attention, concentration, language, recent or remote memory although these were not formally tested.     ASSESSMENT                                                                                                      HISTORICAL:  Initial psych note 9/14/16        NOTES:  Depakote in past didn't like it. Lithium in past metallic past but did work: doesn't want to go on it again.  Jani Langford is a 36 yo male with history of bipolar disorder, PTSD and chem dep - on Seroquel total 700 mg and been on for awhile. Main issue is sleep : has not had luck with sleep meds so discussed I will certainly try but given he has not found past meds effective more unlikely we are going to find med that works. We tried Remeron with no luck. We tried Topamax and no luck. He saw Dr. Garcia  and started on Mirapex. Last visit we were going to try Doxepin but insurance wouldn't cover. We tried Belsomra and didn't help.      Our change of Seroquel to Abilify for Jani has definitely been a risk vs. Benefit of medicaton change. Thus far we " are both happy with how the change from Seroquel to Abilify. He was fidgety today, some issues with attention / concentration but no FOI or ERIC. I don't think he's manic and neither does he feel he is.  I'm fine with keeping Klonopin for now. He is getting RESTLESSNESS , likely I think from aBilify. I think before we make another big switch , worth trying propranolol. I'm not keen on adding meds, but in this case I think we may be able to get rid of the restlessness.     OF NOTE he takes albuterol but notes rarely maybe every couple weeks. We discussed propranolol can exacerbate respiratory issues so he knows contact me / urgent care / ER need be if became issues.      TREATMENT RISK STATEMENT:  The risks, benefits, alternatives and potential adverse effects have been explained and are understood by the pt.  The pt agrees to the treatment plan with the ability to do so.   The pt knows to call the clinic for any problems or access emergency care if needed.        DIAGNOSES                     Bipolar I disorder with history of psychotic features               PTSD       PLAN                                                                                                                    1)  MEDICATIONS:         -- Start propranolol 10 mg BID. continue Klonopin 1 mg HS. Continue Abilify 10 mg daily.       2)  THERAPY:  No Change    3)  LABS:  lipid panel and glucose, LFTs, BMP including fasting glucose were done in May '17 and then in nov '17 as was glucose. EKG 3/2017    4)  PT MONITOR [call for probs]:  Worsening symptoms, SI/HI, SEs from meds    5)  REFERRALS [CD, medical, other]: none    6)  RTC: ~3 weeks

## 2018-02-10 ASSESSMENT — ANXIETY QUESTIONNAIRES: GAD7 TOTAL SCORE: 11

## 2018-02-10 ASSESSMENT — PATIENT HEALTH QUESTIONNAIRE - PHQ9: SUM OF ALL RESPONSES TO PHQ QUESTIONS 1-9: 13

## 2018-02-28 ENCOUNTER — OFFICE VISIT (OUTPATIENT)
Dept: PSYCHIATRY | Facility: OTHER | Age: 38
End: 2018-02-28
Attending: PSYCHIATRY & NEUROLOGY
Payer: COMMERCIAL

## 2018-02-28 VITALS
WEIGHT: 210 LBS | TEMPERATURE: 99 F | HEART RATE: 82 BPM | SYSTOLIC BLOOD PRESSURE: 108 MMHG | DIASTOLIC BLOOD PRESSURE: 58 MMHG | BODY MASS INDEX: 27.9 KG/M2

## 2018-02-28 DIAGNOSIS — F31.9 BIPOLAR I DISORDER (H): ICD-10-CM

## 2018-02-28 PROCEDURE — 99214 OFFICE O/P EST MOD 30 MIN: CPT | Performed by: PSYCHIATRY & NEUROLOGY

## 2018-02-28 PROCEDURE — G0463 HOSPITAL OUTPT CLINIC VISIT: HCPCS

## 2018-02-28 RX ORDER — PROPRANOLOL HYDROCHLORIDE 20 MG/1
20 TABLET ORAL 2 TIMES DAILY
Qty: 60 TABLET | Refills: 3 | Status: SHIPPED | OUTPATIENT
Start: 2018-02-28 | End: 2018-04-30

## 2018-02-28 RX ORDER — CLONAZEPAM 1 MG/1
1 TABLET ORAL AT BEDTIME
Qty: 30 TABLET | Refills: 3 | Status: SHIPPED | OUTPATIENT
Start: 2018-03-06 | End: 2018-06-22

## 2018-02-28 RX ORDER — ARIPIPRAZOLE 10 MG/1
10 TABLET ORAL DAILY
Qty: 30 TABLET | Refills: 3 | Status: SHIPPED | OUTPATIENT
Start: 2018-02-28 | End: 2018-04-30

## 2018-02-28 ASSESSMENT — ANXIETY QUESTIONNAIRES
GAD7 TOTAL SCORE: 10
3. WORRYING TOO MUCH ABOUT DIFFERENT THINGS: SEVERAL DAYS
6. BECOMING EASILY ANNOYED OR IRRITABLE: SEVERAL DAYS
IF YOU CHECKED OFF ANY PROBLEMS ON THIS QUESTIONNAIRE, HOW DIFFICULT HAVE THESE PROBLEMS MADE IT FOR YOU TO DO YOUR WORK, TAKE CARE OF THINGS AT HOME, OR GET ALONG WITH OTHER PEOPLE: VERY DIFFICULT
7. FEELING AFRAID AS IF SOMETHING AWFUL MIGHT HAPPEN: SEVERAL DAYS
1. FEELING NERVOUS, ANXIOUS, OR ON EDGE: MORE THAN HALF THE DAYS
5. BEING SO RESTLESS THAT IT IS HARD TO SIT STILL: MORE THAN HALF THE DAYS
2. NOT BEING ABLE TO STOP OR CONTROL WORRYING: SEVERAL DAYS

## 2018-02-28 ASSESSMENT — PATIENT HEALTH QUESTIONNAIRE - PHQ9: 5. POOR APPETITE OR OVEREATING: MORE THAN HALF THE DAYS

## 2018-02-28 ASSESSMENT — PAIN SCALES - GENERAL: PAINLEVEL: NO PAIN (0)

## 2018-02-28 NOTE — PROGRESS NOTES
"  PSYCHIATRY CLINIC PROGRESS NOTE   20 minute medication management, more than 50% of time spent counseling patient on medications, medication side effects, symptom history and management   SUBJECTIVE / INTERIM HISTORY                                                                        Social- with GF Phoebe and her kids Children- Phoebe's kids are 5, 7 , and 10 yo  Last visit:     - March 9 [place] see Phoebe's mom  - Phoebe tells him he's been snappy, but \"not a lot\"  - sleep is rough: off 50 mg of the Seroquel  - lot of pacing, rocking back and forth  - questioning about keep abilify or not...  - RESTLESSNESS still but not quite as bad as when started on abilify  - Klonopin is helping him more with mood , anxiety whereas in past when he took Xanax which \"I'd be sleeping in half hour\"  - has been dx PTSD in past: when was working with Bernabe Metcalfjenni.....  - aggressive in past while manic.  SUBSTANCE USE- smokes 1/2 PPD since age 15 yo.  past use  Meth will be year in about [employer]. , MJ. tx [place] and [facility]. EtOH now rare.    SYMPTOMS- Decreased appetite, increased energy  Insomnia still.  Paranoia has improved. No manic sx.  nightmares, flashbacks, detached, angry outbursts, self-destructive, startle response, hypervigilance and fear Sleep now: about 2-3 hours then up for awhile, then another hour. His goal would be 6 continuous.  MEDICAL ROS- loss off appetite.  SOB: usually at night. But sometimes during day as well not even with exertion.  increased appetite / weight  MEDICAL / SURGICAL HISTORY                   Patient Active Problem List   Diagnosis     Bipolar disorder with psychotic features (H)     Insomnia     History of mental disorder     Attention deficit disorder     Posttraumatic stress disorder     Decreased sex drive     Gastroesophageal reflux disease without esophagitis     ALLERGY   Bee venom; Latex; Percocet [oxycodone-acetaminophen]; Toradol; Tramadol; and Tegretol " "[carbamazepine]  MEDICATIONS                                                                                             Current Outpatient Prescriptions   Medication Sig     propranolol (INDERAL) 10 MG tablet Take 1 tablet (10 mg) by mouth 2 times daily     clonazePAM (KLONOPIN) 1 MG tablet Take 1 tablet (1 mg) by mouth At Bedtime     ARIPiprazole (ABILIFY) 10 MG tablet Take 1 tablet (10 mg) by mouth daily * start once down to 150 mg of quetiapine     albuterol (PROAIR HFA/PROVENTIL HFA/VENTOLIN HFA) 108 (90 BASE) MCG/ACT Inhaler Inhale 2 puffs into the lungs every 6 hours as needed for shortness of breath / dyspnea or wheezing     omeprazole (PRILOSEC) 20 MG CR capsule Take 1 capsule (20 mg) by mouth daily     No current facility-administered medications for this visit.          PAST MEDICATION TRIALS    Prozac (fluoxetine), Zoloft (sertraline), Paxil (paroxetine) and Cymbalta (duloxetine). Paxil sexual SEs and constipation. Mirtazapine ineffective for insomnia.   Elavil (amitriptyline).   Lithium Carbonate, Depakote and Depakot ER (valproate) and Neurontin. Topamax didn't help (gabapentin). Lithium \"that was like eating pennies and nickels\" but helped / effective. Didn't like the blood level draws with lithium. Doesn't think has been on tegretol or trileptal.   Seroquel (quetiapine).   no older antipsychotics.   Xanax (alprazolam). Doesn't think has been on Buspar   Ambien (zolpidem) and Lunesta (eszopiclone). Lunesta helped initial insomnia, NOT middle. Ambien made him violent and agitated.       VITALS   /58 (BP Location: Right arm, Patient Position: Sitting, Cuff Size: Adult Regular)  Pulse 82  Temp 99  F (37.2  C) (Tympanic)  Wt 210 lb (95.3 kg)  BMI 27.9 kg/m2     PHQ9                     [unfilled]  LABS                                                                                                                           Recent Labs   Lab Test  11/22/17   0914  05/08/17   0919   CHOL  " "216*  258*   HDL  25*  27*   LDL  132*  Cannot estimate LDL when triglyceride exceeds 400 mg/dL   TRIG  295*  403*     Glucose 126 11/22/17 and was 97 5/8/17.      ms (on Seroquel) March 2017    MENTAL STATUS EXAM                                                                                        Alert. Oriented to person, place, and date / time. Casually groomed, calm, cooperative with good eye contact. No problems with speech. Psychomotor: fidgety, rocking back and forth. Mood was described as \"kind of amped up\" and affect was congruent to speech content and full range. Thought process, including associations, was unremarkable and thought content was devoid of suicidal and homicidal ideation and psychotic thought. No hallucinations. Insight was good. Judgment was intact and adequate for safety. Fund of knowledge was intact. Pt demonstrates no obvious problems with attention, concentration, language, recent or remote memory although these were not formally tested.     ASSESSMENT                                                                                                      HISTORICAL:  Initial psych note 9/14/16        NOTES:  Depakote in past didn't like it. Lithium in past metallic past but did work: doesn't want to go on it again. Trazodone: restless leg.   Jani Langford is a 38 yo male with history of bipolar disorder, PTSD and chem dep - on Seroquel total 700 mg and been on for awhile. Main issue is sleep : has not had luck with sleep meds so discussed I will certainly try but given he has not found past meds effective more unlikely we are going to find med that works. We tried Remeron with no luck. We tried Topamax and no luck. He saw Dr. Garcia  and started on Mirapex. Last visit we were going to try Doxepin but insurance wouldn't cover. We tried Belsomra and didn't help.    Our change of Seroquel to Abilify for Jani has definitely been a risk vs. Benefit of medicaton change. Thus far we are " both happy with how the change from Seroquel to Abilify. He was fidgety last visit, some issues with attention / concentration but no FOI or ERIC. I didn't think he's manic and neither does he feel he is.  I'm fine with keeping Klonopin for now. He is getting RESTLESSNESS , likely I think from aBilify. I think before we make another big switch , worth trying increase of propranolol.     OF NOTE he takes albuterol but notes rarely maybe every couple weeks. We discussed propranolol can exacerbate respiratory issues so he knows contact me / urgent care / ER need be if became issues. Thus far doing okay... We are going to try increase of proprnaolol.       TREATMENT RISK STATEMENT:  The risks, benefits, alternatives and potential adverse effects have been explained and are understood by the pt.  The pt agrees to the treatment plan with the ability to do so.   The pt knows to call the clinic for any problems or access emergency care if needed.        DIAGNOSES                     Bipolar I disorder with history of psychotic features               PTSD       PLAN                                                                                                                    1)  MEDICATIONS:         --Increase propranolol 10 mg twice daily to 20 mg twice daily. . continue Klonopin 1 mg HS. Continue Abilify 10 mg daily.       2)  THERAPY:  No Change    3)  LABS:  lipid panel and glucose, LFTs, BMP including fasting glucose were done in May '17 and then in nov '17 as was glucose. EKG 3/2017    4)  PT MONITOR [call for probs]:  Worsening symptoms, SI/HI, SEs from meds    5)  REFERRALS [CD, medical, other]: none    6)  RTC: ~3 weeks

## 2018-02-28 NOTE — MR AVS SNAPSHOT
"              After Visit Summary   2/28/2018    Jani Langford    MRN: [id number]           Patient Information     Date Of Birth          1980        Visit Information        Provider Department      2/28/2018 1:40 PM [facility]        Today's Diagnoses     Bipolar I disorder (H)           Follow-ups after your visit        Your next 10 appointments already scheduled     Apr 09, 2018  8:40 AM CDT   (Arrive by 8:25 AM)   Return Visit with Renee Vuong MD   [facility] [place] ([facility] [place] )    [address] [place] [address]  [place] [address]   [phone]              Who to contact     If you have questions or need follow up information about today's clinic visit or your schedule please contact [facility] directly at [phone].  Normal or non-critical lab and imaging results will be communicated to you by MyChart, letter or phone within 4 business days after the clinic has received the results. If you do not hear from us within 7 days, please contact the clinic through MyChart or phone. If you have a critical or abnormal lab result, we will notify you by phone as soon as possible.  Submit refill requests through [employer] or call your pharmacy and they will forward the refill request to us. Please allow 3 business days for your refill to be completed.          Additional Information About Your Visit        MyChart Information     [employer] lets you send messages to your doctor, view your test results, renew your prescriptions, schedule appointments and more. To sign up, go to [email or web address] . Click on \"Log in\" on the left side of the screen, which will take you to the Welcome page. Then click on \"Sign up Now\" on the right side of the page.     You will be asked to enter the access code listed below, as well as some personal information. Please follow the directions to create your username and password.     Your access code is: " [email or web address]  Expires: 2018  2:20 PM     Your access code will  in 90 days. If you need help or a new code, please call your [facility] or [phone].        Care EveryWhere ID     This is your Care EveryWhere ID. This could be used by other organizations to access your [place] medical records  [id number]        Your Vitals Were     Pulse Temperature BMI (Body Mass Index)             82 99  F (37.2  C) (Tympanic) 27.9 kg/m2          Blood Pressure from Last 3 Encounters:   18 108/58   18 128/68   18 116/60    Weight from Last 3 Encounters:   18 210 lb (95.3 kg)   18 210 lb (95.3 kg)   18 210 lb (95.3 kg)              Today, you had the following     No orders found for display         Today's Medication Changes          These changes are accurate as of 18  2:20 PM.  If you have any questions, ask your nurse or doctor.               These medicines have changed or have updated prescriptions.        Dose/Directions    ARIPiprazole 10 MG tablet   Commonly known as:  ABILIFY   This may have changed:  additional instructions   Used for:  Bipolar I disorder (H)   Changed by:  Renee Vuong MD        Dose:  10 mg   Take 1 tablet (10 mg) by mouth daily   Quantity:  30 tablet   Refills:  3       propranolol 20 MG tablet   Commonly known as:  INDERAL   This may have changed:    - medication strength  - how much to take   Used for:  Bipolar I disorder (H)   Changed by:  Renee Vuong MD        Dose:  20 mg   Take 1 tablet (20 mg) by mouth 2 times daily   Quantity:  60 tablet   Refills:  3            Where to get your medicines      These medications were sent to [id number] - [place] - [address]  [address] [place] [address]     Phone:  [phone]     propranolol 20 MG tablet         Some of these will need a paper prescription and others can be bought over the counter.  Ask your nurse if you have questions.     Bring a paper  prescription for each of these medications     clonazePAM 1 MG tablet         Call your pharmacy to confirm that your medication is ready for pickup. It may take up to 24 hours for them to receive the prescription. If the prescription is not ready within 3 business days, please contact your clinic or your provider.     We will let you know when these medications are ready. If you don't hear back within 3 business days, please contact us.     ARIPiprazole 10 MG tablet                Primary Care Provider Office Phone # Fax #    R Wilmer Mendez [phone]       [facility] [place] [address]        Equal Access to Services     [facility]: Hadii kalia lima hadasho Soomaali, waaxda luqadaha, qaybta kaalmaregulo christine, [place] [facility] . So [facility] [phone].    ATENCIÓN: Si habla español, tiene a davis disposición servicios gratuitos de asistencia lingüística. [facility] [phone].    We comply with applicable federal civil rights laws and [place] laws. We do not discriminate on the basis of race, color, national origin, age, disability, sex, sexual orientation, or gender identity.            Thank you!     Thank you for choosing [facility]  for your care. Our goal is always to provide you with excellent care. Hearing back from our patients is one way we can continue to improve our services. Please take a few minutes to complete the written survey that you may receive in the mail after your visit with us. Thank you!             Your Updated Medication List - Protect others around you: Learn how to safely use, store and throw away your medicines at www.disposemymeds.org.          This list is accurate as of 2/28/18  2:20 PM.  Always use your most recent med list.                   Brand Name Dispense Instructions for use Diagnosis    albuterol 108 (90 BASE) MCG/ACT Inhaler    PROAIR HFA/PROVENTIL HFA/VENTOLIN HFA    1 Inhaler    Inhale 2 puffs into the lungs  every 6 hours as needed for shortness of breath / dyspnea or wheezing    Dyspnea, unspecified type       ARIPiprazole 10 MG tablet    ABILIFY    30 tablet    Take 1 tablet (10 mg) by mouth daily    Bipolar I disorder (H)       clonazePAM 1 MG tablet   Start taking on:  3/6/2018    klonoPIN    30 tablet    Take 1 tablet (1 mg) by mouth At Bedtime    Bipolar I disorder (H)       omeprazole 20 MG CR capsule    priLOSEC    30 capsule    Take 1 capsule (20 mg) by mouth daily    Gastroesophageal reflux disease without esophagitis       propranolol 20 MG tablet    INDERAL    60 tablet    Take 1 tablet (20 mg) by mouth 2 times daily    Bipolar I disorder (H)

## 2018-02-28 NOTE — NURSING NOTE
"Chief Complaint   Patient presents with     RECHECK     Mental health.  Patient states that he is still restless.  Discuss Klonopin       Initial /58 (BP Location: Right arm, Patient Position: Sitting, Cuff Size: Adult Regular)  Pulse 82  Temp 99  F (37.2  C) (Tympanic)  Wt 210 lb (95.3 kg)  BMI 27.9 kg/m2 Estimated body mass index is 27.9 kg/(m^2) as calculated from the following:    Height as of 12/18/17: 6' 0.75\" (1.848 m).    Weight as of this encounter: 210 lb (95.3 kg).  Medication Reconciliation: complete     KIERAN GARCIA      "

## 2018-03-01 ASSESSMENT — PATIENT HEALTH QUESTIONNAIRE - PHQ9: SUM OF ALL RESPONSES TO PHQ QUESTIONS 1-9: 12

## 2018-03-01 ASSESSMENT — ANXIETY QUESTIONNAIRES: GAD7 TOTAL SCORE: 10

## 2018-04-09 ENCOUNTER — OFFICE VISIT (OUTPATIENT)
Dept: PSYCHIATRY | Facility: OTHER | Age: 38
End: 2018-04-09
Attending: FAMILY MEDICINE
Payer: COMMERCIAL

## 2018-04-09 VITALS
TEMPERATURE: 98.2 F | WEIGHT: 195 LBS | SYSTOLIC BLOOD PRESSURE: 90 MMHG | HEART RATE: 65 BPM | BODY MASS INDEX: 25.9 KG/M2 | DIASTOLIC BLOOD PRESSURE: 52 MMHG

## 2018-04-09 DIAGNOSIS — F31.10 BIPOLAR I DISORDER, MOST RECENT EPISODE (OR CURRENT) MANIC (H): Primary | ICD-10-CM

## 2018-04-09 PROCEDURE — G0463 HOSPITAL OUTPT CLINIC VISIT: HCPCS

## 2018-04-09 PROCEDURE — 99214 OFFICE O/P EST MOD 30 MIN: CPT | Performed by: PSYCHIATRY & NEUROLOGY

## 2018-04-09 ASSESSMENT — ANXIETY QUESTIONNAIRES
2. NOT BEING ABLE TO STOP OR CONTROL WORRYING: SEVERAL DAYS
7. FEELING AFRAID AS IF SOMETHING AWFUL MIGHT HAPPEN: NOT AT ALL
3. WORRYING TOO MUCH ABOUT DIFFERENT THINGS: MORE THAN HALF THE DAYS
1. FEELING NERVOUS, ANXIOUS, OR ON EDGE: MORE THAN HALF THE DAYS
GAD7 TOTAL SCORE: 11
5. BEING SO RESTLESS THAT IT IS HARD TO SIT STILL: NEARLY EVERY DAY
6. BECOMING EASILY ANNOYED OR IRRITABLE: NOT AT ALL
IF YOU CHECKED OFF ANY PROBLEMS ON THIS QUESTIONNAIRE, HOW DIFFICULT HAVE THESE PROBLEMS MADE IT FOR YOU TO DO YOUR WORK, TAKE CARE OF THINGS AT HOME, OR GET ALONG WITH OTHER PEOPLE: VERY DIFFICULT

## 2018-04-09 ASSESSMENT — PAIN SCALES - GENERAL: PAINLEVEL: NO PAIN (0)

## 2018-04-09 ASSESSMENT — PATIENT HEALTH QUESTIONNAIRE - PHQ9: 5. POOR APPETITE OR OVEREATING: NEARLY EVERY DAY

## 2018-04-09 NOTE — NURSING NOTE
"Chief Complaint   Patient presents with     RECHECK     Mental health.  Discuss Propranolol and Abilify.  Patient c/o RLS.       Initial BP 90/52 (BP Location: Right arm, Patient Position: Sitting, Cuff Size: Adult Regular)  Pulse 65  Temp 98.2  F (36.8  C) (Tympanic)  Wt 195 lb (88.5 kg)  BMI 25.9 kg/m2 Estimated body mass index is 25.9 kg/(m^2) as calculated from the following:    Height as of 12/18/17: 6' 0.75\" (1.848 m).    Weight as of this encounter: 195 lb (88.5 kg).  Medication Reconciliation: complete     KIERAN GARCIA      "

## 2018-04-09 NOTE — MR AVS SNAPSHOT
"              After Visit Summary   4/9/2018    Jani Langford    MRN: [id number]           Patient Information     Date Of Birth          1980        Visit Information        Provider Department      4/9/2018 8:40 AM Renee Vuong MD [facility] [place]        Today's Diagnoses     Bipolar I disorder, most recent episode (or current) manic (H)    -  1       Follow-ups after your visit        Your next 10 appointments already scheduled     Apr 30, 2018  8:40 AM CDT   (Arrive by 8:25 AM)   Return Visit with Renee Vuong MD   [facility] [place] ([facility] [place] )    [address] [place] [phone] [phone]              Who to contact     If you have questions or need follow up information about today's clinic visit or your schedule please [facility] directly at [phone].  Normal or non-critical lab and imaging results will be communicated to you by MyChart, letter or phone within 4 business days after the clinic has received the results. If you do not hear from us within 7 days, please contact the clinic through MyChart or phone. If you have a critical or abnormal lab result, we will notify you by phone as soon as possible.  Submit refill requests through [employer] or call your pharmacy and they will forward the refill request to us. Please allow 3 business days for your refill to be completed.          Additional Information About Your Visit        MyChart Information     [employer] lets you send messages to your doctor, view your test results, renew your prescriptions, schedule appointments and more. To sign up, go to [email or web address] . Click on \"Log in\" on the left side of the screen, which will take you to the Welcome page. Then click on \"Sign up Now\" on the right side of the page.     You will be asked to enter the access code listed below, as well as some personal information. Please follow the directions to create your " username and password.     Your access code is: [email or web address]  Expires: 2018  3:20 PM     Your access code will  in 90 days. If you need help or a new code, please call your [place] clinic or [phone].        Care EveryWhere ID     This is your Care EveryWhere ID. This could be used by other organizations to access your [place] medical records  [id number]        Your Vitals Were     Pulse Temperature BMI (Body Mass Index)             65 98.2  F (36.8  C) (Tympanic) 25.9 kg/m2          Blood Pressure from Last 3 Encounters:   18 90/52   18 108/58   18 128/68    Weight from Last 3 Encounters:   18 195 lb (88.5 kg)   18 210 lb (95.3 kg)   18 210 lb (95.3 kg)              Today, you had the following     No orders found for display       Primary Care Provider Office Phone # Fax #    R Wilmer Mendez [phone]       [facility] [id number]        Equal Access to Services     [facility]: Hadii aad ku hadasho [place], waaxda luqadaha, qaybta kaalmada adeegyada, [place] [facility]od . So [facility] [phone].    ATENCIÓN: Si habla español, tiene a davis disposición servicios gratuitos de asistencia lingüística. Llame al [phone].    We comply with applicable federal civil rights laws and [place] laws. We do not discriminate on the basis of race, color, national origin, age, disability, sex, sexual orientation, or gender identity.            Thank you!     Thank you for choosing [facility]  for your care. Our goal is always to provide you with excellent care. Hearing back from our patients is one way we can continue to improve our services. Please take a few minutes to complete the written survey that you may receive in the mail after your visit with us. Thank you!             Your Updated Medication List - Protect others around you: Learn how to safely use, store and throw away your medicines at  www.disposemymeds.org.          This list is accurate as of 4/9/18  9:24 AM.  Always use your most recent med list.                   Brand Name Dispense Instructions for use Diagnosis    albuterol 108 (90 BASE) MCG/ACT Inhaler    PROAIR HFA/PROVENTIL HFA/VENTOLIN HFA    1 Inhaler    Inhale 2 puffs into the lungs every 6 hours as needed for shortness of breath / dyspnea or wheezing    Dyspnea, unspecified type       ARIPiprazole 10 MG tablet    ABILIFY    30 tablet    Take 1 tablet (10 mg) by mouth daily    Bipolar I disorder (H)       clonazePAM 1 MG tablet    klonoPIN    30 tablet    Take 1 tablet (1 mg) by mouth At Bedtime    Bipolar I disorder (H)       omeprazole 20 MG CR capsule    priLOSEC    30 capsule    Take 1 capsule (20 mg) by mouth daily    Gastroesophageal reflux disease without esophagitis       propranolol 20 MG tablet    INDERAL    60 tablet    Take 1 tablet (20 mg) by mouth 2 times daily    Bipolar I disorder (H)

## 2018-04-09 NOTE — PROGRESS NOTES
"  PSYCHIATRY CLINIC PROGRESS NOTE   20 minute medication management, more than 50% of time spent counseling patient on medications, medication side effects, symptom history and management   SUBJECTIVE / INTERIM HISTORY                                                                        Social- with ISABEL Sandhu and her kids Children- Phoebe's kids are 5, 7 , and 12 yo  Last visit: Increase propranolol 10 mg twice daily to 20 mg twice daily. . continue Klonopin 1 mg HS. Continue Abilify 10 mg daily.     - restless really bad... Can't sit still, pacing all the time  - roadtrips are really bad  - Seroquel 50 mg helps him sleep a bit   - lot of pacing, rocking back and forth  - Klonopin is helping him more with mood , anxiety whereas in past when he took Xanax which \"I'd be sleeping in half hour\"  - has been dx PTSD in past: when was working with Bernabe Browning.....  - aggressive in past while manic.  SUBSTANCE USE- smokes 1/2 PPD since age 17 yo.  past use  Meth will be year in about [employer] [facility]. , MJ. tx [facility] and [facility]. EtOH now rare.    SYMPTOMS- Decreased appetite, increased energy  Insomnia still.  Paranoia has improved. No manic sx.  nightmares, flashbacks, detached, angry outbursts, self-destructive, startle response, hypervigilance and fear Sleep now: about 2-3 hours then up for awhile, then another hour. His goal would be 6 continuous.  MEDICAL ROS- loss off appetite little better taking Seroquel 50 mg.  SOB: usually at night. But sometimes during day as well not even with exertion.  increased appetite / weight  MEDICAL / SURGICAL HISTORY                   Patient Active Problem List   Diagnosis     Bipolar disorder with psychotic features (H)     Insomnia     History of mental disorder     Attention deficit disorder     Posttraumatic stress disorder     Decreased sex drive     Gastroesophageal reflux disease without esophagitis     ALLERGY   Bee venom; Latex; Percocet [oxycodone-acetaminophen]; Toradol; " "Tramadol; and Tegretol [carbamazepine]  MEDICATIONS                                                                                             Current Outpatient Prescriptions   Medication Sig     ARIPiprazole (ABILIFY) 10 MG tablet Take 1 tablet (10 mg) by mouth daily     clonazePAM (KLONOPIN) 1 MG tablet Take 1 tablet (1 mg) by mouth At Bedtime     propranolol (INDERAL) 20 MG tablet Take 1 tablet (20 mg) by mouth 2 times daily     albuterol (PROAIR HFA/PROVENTIL HFA/VENTOLIN HFA) 108 (90 BASE) MCG/ACT Inhaler Inhale 2 puffs into the lungs every 6 hours as needed for shortness of breath / dyspnea or wheezing     omeprazole (PRILOSEC) 20 MG CR capsule Take 1 capsule (20 mg) by mouth daily     No current facility-administered medications for this visit.          PAST MEDICATION TRIALS    Prozac (fluoxetine), Zoloft (sertraline), Paxil (paroxetine) and Cymbalta (duloxetine). Paxil sexual SEs and constipation. Mirtazapine ineffective for insomnia.   Elavil (amitriptyline).   Lithium Carbonate, Depakote and Depakot ER (valproate) and Neurontin. Topamax didn't help (gabapentin). Lithium \"that was like eating pennies and nickels\" but helped / effective. Didn't like the blood level draws with lithium. Doesn't think has been on tegretol or trileptal.   Seroquel (quetiapine).   no older antipsychotics.   Xanax (alprazolam). Doesn't think has been on Buspar   Ambien (zolpidem) and Lunesta (eszopiclone). Lunesta helped initial insomnia, NOT middle. Ambien made him violent and agitated.       VITALS   BP 90/52 (BP Location: Right arm, Patient Position: Sitting, Cuff Size: Adult Regular)  Pulse 65  Temp 98.2  F (36.8  C) (Tympanic)  Wt 195 lb (88.5 kg)  BMI 25.9 kg/m2     PHQ9                     [unfilled]  LABS                                                                                                                           Recent Labs   Lab Test  11/22/17   0914  05/08/17   0919   CHOL  216*  258*   HDL  25* " " 27*   LDL  132*  Cannot estimate LDL when triglyceride exceeds 400 mg/dL   TRIG  295*  403*     Glucose 126 11/22/17 and was 97 5/8/17.      ms (on Seroquel) March 2017    MENTAL STATUS EXAM                                                                                        Alert. Oriented to person, place, and date / time. Casually groomed, calm, cooperative with good eye contact. No problems with speech. Psychomotor: restless. Mood was described as \"okay, but restless\" and affect was congruent to speech content and full range. Thought process, including associations, was unremarkable and thought content was devoid of suicidal and homicidal ideation and psychotic thought. No hallucinations. Insight was good. Judgment was intact and adequate for safety. Fund of knowledge was intact. Pt demonstrates no obvious problems with attention, concentration, language, recent or remote memory although these were not formally tested.     ASSESSMENT                                                                                                      HISTORICAL:  Initial psych note 9/14/16        NOTES:  Depakote in past didn't like it. Lithium in past metallic past but did work: doesn't want to go on it again. Trazodone: restless leg.   Jani Langford is a 38 yo male with history of bipolar disorder, PTSD and chem dep - on Seroquel total 700 mg and been on for awhile. Main issue is sleep : has not had luck with sleep meds so discussed I will certainly try but given he has not found past meds effective more unlikely we are going to find med that works. We tried Remeron with no luck. We tried Topamax and no luck. He saw Dr. Garcia  and started on Mirapex. Last visit we were going to try Doxepin but insurance wouldn't cover. We tried Belsomra and didn't help.    Our change of Seroquel to Abilify for Jani went well initially. Now, his akathisia is really bad. He has been taking Seroquel 50 mg at night which is helping. We " feel side effects negate benefits: for now we agreed on           TREATMENT RISK STATEMENT:  The risks, benefits, alternatives and potential adverse effects have been explained and are understood by the pt.  The pt agrees to the treatment plan with the ability to do so.   The pt knows to call the clinic for any problems or access emergency care if needed.        DIAGNOSES                     Bipolar I disorder with history of psychotic features               PTSD       PLAN                                                                                                                    1)  MEDICATIONS:         --Discontinue propranolol. Increase Seroquel 50 mg to 100 mg.  continue Klonopin 1 mg HS. Decrease Abilify 10 mg to 5 mg for week then discontinue.       2)  THERAPY:  No Change    3)  LABS:  lipid panel and glucose, LFTs, BMP including fasting glucose were done in May '17 and then in nov '17 as was glucose. EKG 3/2017    4)  PT MONITOR [call for probs]:  Worsening symptoms, SI/HI, SEs from meds    5)  REFERRALS [CD, medical, other]: none    6)  RTC: ~3-4 weeks

## 2018-04-10 ASSESSMENT — PATIENT HEALTH QUESTIONNAIRE - PHQ9: SUM OF ALL RESPONSES TO PHQ QUESTIONS 1-9: 16

## 2018-04-10 ASSESSMENT — ANXIETY QUESTIONNAIRES: GAD7 TOTAL SCORE: 11

## 2018-04-30 ENCOUNTER — OFFICE VISIT (OUTPATIENT)
Dept: PSYCHIATRY | Facility: OTHER | Age: 38
End: 2018-04-30
Attending: PSYCHIATRY & NEUROLOGY
Payer: COMMERCIAL

## 2018-04-30 VITALS
TEMPERATURE: 98 F | BODY MASS INDEX: 25.9 KG/M2 | DIASTOLIC BLOOD PRESSURE: 58 MMHG | SYSTOLIC BLOOD PRESSURE: 100 MMHG | WEIGHT: 195 LBS | HEART RATE: 64 BPM

## 2018-04-30 DIAGNOSIS — F31.9 BIPOLAR I DISORDER (H): ICD-10-CM

## 2018-04-30 PROCEDURE — 99213 OFFICE O/P EST LOW 20 MIN: CPT | Performed by: PSYCHIATRY & NEUROLOGY

## 2018-04-30 PROCEDURE — G0463 HOSPITAL OUTPT CLINIC VISIT: HCPCS

## 2018-04-30 RX ORDER — CLONAZEPAM 0.5 MG/1
0.5 TABLET, ORALLY DISINTEGRATING ORAL DAILY PRN
Qty: 30 TABLET | Refills: 2 | Status: SHIPPED | OUTPATIENT
Start: 2018-04-30 | End: 2018-08-28 | Stop reason: DRUGHIGH

## 2018-04-30 ASSESSMENT — ANXIETY QUESTIONNAIRES
6. BECOMING EASILY ANNOYED OR IRRITABLE: SEVERAL DAYS
2. NOT BEING ABLE TO STOP OR CONTROL WORRYING: MORE THAN HALF THE DAYS
3. WORRYING TOO MUCH ABOUT DIFFERENT THINGS: MORE THAN HALF THE DAYS
IF YOU CHECKED OFF ANY PROBLEMS ON THIS QUESTIONNAIRE, HOW DIFFICULT HAVE THESE PROBLEMS MADE IT FOR YOU TO DO YOUR WORK, TAKE CARE OF THINGS AT HOME, OR GET ALONG WITH OTHER PEOPLE: VERY DIFFICULT
7. FEELING AFRAID AS IF SOMETHING AWFUL MIGHT HAPPEN: MORE THAN HALF THE DAYS
1. FEELING NERVOUS, ANXIOUS, OR ON EDGE: MORE THAN HALF THE DAYS
5. BEING SO RESTLESS THAT IT IS HARD TO SIT STILL: NEARLY EVERY DAY
GAD7 TOTAL SCORE: 15

## 2018-04-30 ASSESSMENT — PATIENT HEALTH QUESTIONNAIRE - PHQ9: 5. POOR APPETITE OR OVEREATING: NEARLY EVERY DAY

## 2018-04-30 ASSESSMENT — PAIN SCALES - GENERAL: PAINLEVEL: NO PAIN (0)

## 2018-04-30 NOTE — MR AVS SNAPSHOT
"              After Visit Summary   4/30/2018    Jani Langford    MRN: [id number]           Patient Information     Date Of Birth          1980        Visit Information        Provider Department      4/30/2018 8:40 AM Renee Vuong MD [facility] [place]        Today's Diagnoses     Bipolar I disorder (H)           Follow-ups after your visit        Your next 10 appointments already scheduled     May 21, 2018  8:20 AM CDT   (Arrive by 8:05 AM)   Return Visit with Renee Vuong MD   [facility] [place] ([facility] [place] )    [address] [place] [address]  [place] [address]   [phone]              Who to contact     If you have questions or need follow up information about today's clinic visit or your schedule please contact [facility] directly at [phone].  Normal or non-critical lab and imaging results will be communicated to you by MyChart, letter or phone within 4 business days after the clinic has received the results. If you do not hear from us within 7 days, please contact the clinic through MyChart or phone. If you have a critical or abnormal lab result, we will notify you by phone as soon as possible.  Submit refill requests through [employer] or call your pharmacy and they will forward the refill request to us. Please allow 3 business days for your refill to be completed.          Additional Information About Your Visit        MyChart Information     [employer] lets you send messages to your doctor, view your test results, renew your prescriptions, schedule appointments and more. To sign up, go to [email or web address] . Click on \"Log in\" on the left side of the screen, which will take you to the Welcome page. Then click on \"Sign up Now\" on the right side of the page.     You will be asked to enter the access code listed below, as well as some personal information. Please follow the directions to create your username and password.     Your access code is: " [email or web address]  Expires: 2018  3:20 PM     Your access code will  in 90 days. If you need help or a new code, please call your [place] clinic or [phone].        Care EveryWhere ID     This is your Care EveryWhere ID. This could be used by other organizations to access your [facility] medical records  [id number]        Your Vitals Were     Pulse Temperature BMI (Body Mass Index)             64 98  F (36.7  C) (Tympanic) 25.9 kg/m2          Blood Pressure from Last 3 Encounters:   18 100/58   18 90/52   18 108/58    Weight from Last 3 Encounters:   18 195 lb (88.5 kg)   18 195 lb (88.5 kg)   18 210 lb (95.3 kg)              Today, you had the following     No orders found for display         Today's Medication Changes          These changes are accurate as of 18  9:26 AM.  If you have any questions, ask your nurse or doctor.               These medicines have changed or have updated prescriptions.        Dose/Directions    * clonazePAM 1 MG tablet   Commonly known as:  klonoPIN   This may have changed:  Another medication with the same name was added. Make sure you understand how and when to take each.   Used for:  Bipolar I disorder (H)   Changed by:  Renee Vuong MD        Dose:  1 mg   Take 1 tablet (1 mg) by mouth At Bedtime   Quantity:  30 tablet   Refills:  3       * clonazePAM 0.5 MG ODT tab   Commonly known as:  klonoPIN   This may have changed:  You were already taking a medication with the same name, and this prescription was added. Make sure you understand how and when to take each.   Used for:  Bipolar I disorder (H)   Changed by:  Renee Vuong MD        Dose:  0.5 mg   Take 1 tablet (0.5 mg) by mouth daily as needed for anxiety   Quantity:  30 tablet   Refills:  2       * Notice:  This list has 2 medication(s) that are the same as other medications prescribed for you. Read the directions carefully, and ask your doctor or other care provider  to review them with you.         Where to get your medicines      Some of these will need a paper prescription and others can be bought over the counter.  Ask your nurse if you have questions.     Bring a paper prescription for each of these medications     clonazePAM 0.5 MG ODT tab                Primary Care Provider Office Phone # Fax #    R Wilmer Mendez [phone] [address] [address]        Equal Access to Services     MIKE SAHU : Hadii aad ku hadasho Soomaali, waaxda luqadaha, qaybta kaalmada adeegyada, waxay idiin hayaan dulce ariasronaldsacha mcleod . So [facility] [phone].    ATENCIÓN: Si habla español, tiene a davis disposición servicios gratuitos de asistencia lingüística. Llame al [phone].    We comply with applicable federal civil rights laws and [place] laws. We do not discriminate on the basis of race, color, national origin, age, disability, sex, sexual orientation, or gender identity.            Thank you!     Thank you for choosing [facility]  for your care. Our goal is always to provide you with excellent care. Hearing back from our patients is one way we can continue to improve our services. Please take a few minutes to complete the written survey that you may receive in the mail after your visit with us. Thank you!             Your Updated Medication List - Protect others around you: Learn how to safely use, store and throw away your medicines at www.disposemymeds.org.          This list is accurate as of 4/30/18  9:26 AM.  Always use your most recent med list.                   Brand Name Dispense Instructions for use Diagnosis    albuterol 108 (90 Base) MCG/ACT Inhaler    PROAIR HFA/PROVENTIL HFA/VENTOLIN HFA    1 Inhaler    Inhale 2 puffs into the lungs every 6 hours as needed for shortness of breath / dyspnea or wheezing    Dyspnea, unspecified type       * clonazePAM 1 MG tablet    klonoPIN    30 tablet    Take 1 tablet (1 mg) by mouth  At Bedtime    Bipolar I disorder (H)       * clonazePAM 0.5 MG ODT tab    klonoPIN    30 tablet    Take 1 tablet (0.5 mg) by mouth daily as needed for anxiety    Bipolar I disorder (H)       omeprazole 20 MG CR capsule    priLOSEC    30 capsule    Take 1 capsule (20 mg) by mouth daily    Gastroesophageal reflux disease without esophagitis       * Notice:  This list has 2 medication(s) that are the same as other medications prescribed for you. Read the directions carefully, and ask your doctor or other care provider to review them with you.

## 2018-04-30 NOTE — PROGRESS NOTES
"  PSYCHIATRY CLINIC PROGRESS NOTE   20 minute medication management, more than 50% of time spent counseling patient on medications, medication side effects, symptom history and management   SUBJECTIVE / INTERIM HISTORY                                                                        Social- with ISABEL Sandhu and her kids Children- Phoebe's kids are 5, 7 , and 10 yo  Last visit: Discontinue propranolol. Increase Seroquel 50 mg to 100 mg.  continue Klonopin 1 mg HS. Decrease Abilify 10 mg to 5 mg for week then discontinue.     - restless is better since off Abilify  - bit of roller coaster in terms of mood  - Seroquel  helps him sleep a bit   - Klonopin is helping him more with mood , anxiety whereas in past when he took Xanax which \"I'd be sleeping in half hour\"  - has been dx PTSD in past: when was working with Bernabe Browning.....  - aggressive in past while manic.  SUBSTANCE USE- smokes 1/2 PPD since age 17 yo.  past use  Meth will be year in about [place] [facility]. , MJ. tx [facility] and [facility]. EtOH now rare.    SYMPTOMS- Decreased appetite, increased energy  Insomnia still.  Paranoia has improved. No manic sx.  nightmares, flashbacks, detached, angry outbursts, self-destructive, startle response, hypervigilance and fear Sleep now: about 2-3 hours then up for awhile, then another hour. His goal would be 6 continuous.  MEDICAL ROS- loss off appetite little better taking Seroquel 50 mg.  SOB: usually at night. But sometimes during day as well not even with exertion.  increased appetite / weight  MEDICAL / SURGICAL HISTORY                   Patient Active Problem List   Diagnosis     Bipolar disorder with psychotic features (H)     Insomnia     History of mental disorder     Attention deficit disorder     Posttraumatic stress disorder     Decreased sex drive     Gastroesophageal reflux disease without esophagitis     ALLERGY   Bee venom; Latex; Percocet [oxycodone-acetaminophen]; Toradol; Tramadol; and Tegretol " "[carbamazepine]  MEDICATIONS                                                                                             Current Outpatient Prescriptions   Medication Sig     albuterol (PROAIR HFA/PROVENTIL HFA/VENTOLIN HFA) 108 (90 BASE) MCG/ACT Inhaler Inhale 2 puffs into the lungs every 6 hours as needed for shortness of breath / dyspnea or wheezing     clonazePAM (KLONOPIN) 1 MG tablet Take 1 tablet (1 mg) by mouth At Bedtime     omeprazole (PRILOSEC) 20 MG CR capsule Take 1 capsule (20 mg) by mouth daily     No current facility-administered medications for this visit.          PAST MEDICATION TRIALS    Prozac (fluoxetine), Zoloft (sertraline), Paxil (paroxetine) and Cymbalta (duloxetine). Paxil sexual SEs and constipation. Mirtazapine ineffective for insomnia.   Elavil (amitriptyline).   Lithium Carbonate, Depakote and Depakot ER (valproate) and Neurontin. Topamax didn't help (gabapentin). Lithium \"that was like eating pennies and nickels\" but helped / effective. Didn't like the blood level draws with lithium. Doesn't think has been on tegretol or trileptal.   Seroquel (quetiapine).   no older antipsychotics.   Xanax (alprazolam). Doesn't think has been on Buspar   Ambien (zolpidem) and Lunesta (eszopiclone). Lunesta helped initial insomnia, NOT middle. Ambien made him violent and agitated.       VITALS   /58 (BP Location: Right arm, Patient Position: Sitting, Cuff Size: Adult Regular)  Pulse 64  Temp 98  F (36.7  C) (Tympanic)  Wt 195 lb (88.5 kg)  BMI 25.9 kg/m2     PHQ9                     [unfilled]  LABS                                                                                                                           Recent Labs   Lab Test  11/22/17   0914  05/08/17   0919   CHOL  216*  258*   HDL  25*  27*   LDL  132*  Cannot estimate LDL when triglyceride exceeds 400 mg/dL   TRIG  295*  403*     Glucose 126 11/22/17 and was 97 5/8/17.      ms (on Seroquel) March " "2017    MENTAL STATUS EXAM                                                                                        Alert. Oriented to person, place, and date / time. Casually groomed, calm, cooperative with good eye contact. No problems with speech. Psychomotor: NOT restless as he was last visit. Mood was described as \"okay\" and affect was congruent to speech content and full range. Thought process, including associations, was unremarkable and thought content was devoid of suicidal and homicidal ideation and psychotic thought. No hallucinations. Insight was good. Judgment was intact and adequate for safety. Fund of knowledge was intact. Pt demonstrates no obvious problems with attention, concentration, language, recent or remote memory although these were not formally tested.     ASSESSMENT                                                                                                      HISTORICAL:  Initial psych note 9/14/16        NOTES:  Depakote in past didn't like it. Lithium in past metallic past but did work: doesn't want to go on it again. Trazodone: restless leg. Abilify: restless / akathisia. Tegretol rash  Jani Langford is a 38 yo male with history of bipolar disorder, PTSD and chem dep - on Seroquel total 700 mg and been on for awhile. Main issue is sleep : has not had luck with sleep meds so discussed I will certainly try but given he has not found past meds effective more unlikely we are going to find med that works. We tried Remeron with no luck. We tried Topamax and no luck. He saw Dr. Garcia  and started on Mirapex. Last visit we were going to try Doxepin but insurance wouldn't cover. We tried Belsomra and didn't help.    Our change of Seroquel to Abilify for Jani went well initially. Then  akathisia is really bad. He has been taking Seroquel 100 mg. I was thinking carbamazepine but he had rash in past. Thinking latuda but Jani is hesitant as has seen some bad things about it on " commercials.        TREATMENT RISK STATEMENT:  The risks, benefits, alternatives and potential adverse effects have been explained and are understood by the pt.  The pt agrees to the treatment plan with the ability to do so.   The pt knows to call the clinic for any problems or access emergency care if needed.        DIAGNOSES                     Bipolar I disorder with history of psychotic features               PTSD       PLAN                                                                                                                    1)  MEDICATIONS:         --Continue Seroquel 50 mg to 100 mg.  continue Klonopin 1 mg HS and we will add 0.5 mg. Otherwise no meds for now today: he is going to look up saprhis and latuda as that's what I'm thinking as to next for mood stabilizer.      2)  THERAPY:  No Change    3)  LABS:  lipid panel and glucose, LFTs, BMP including fasting glucose were done in May '17 and then in nov '17 as was glucose. EKG 3/2017. Next visit we are going to recheck lipid and glucose.     4)  PT MONITOR [call for probs]:  Worsening symptoms, SI/HI, SEs from meds    5)  REFERRALS [CD, medical, other]: none    6)  RTC: ~3-4 weeks

## 2018-04-30 NOTE — NURSING NOTE
"Chief Complaint   Patient presents with     RECHECK     Mental health.       Initial /58 (BP Location: Right arm, Patient Position: Sitting, Cuff Size: Adult Regular)  Pulse 64  Temp 98  F (36.7  C) (Tympanic)  Wt 195 lb (88.5 kg)  BMI 25.9 kg/m2 Estimated body mass index is 25.9 kg/(m^2) as calculated from the following:    Height as of 12/18/17: 6' 0.75\" (1.848 m).    Weight as of this encounter: 195 lb (88.5 kg).  Medication Reconciliation: complete     KIERAN GARCIA      "

## 2018-04-30 NOTE — MR AVS SNAPSHOT
"              After Visit Summary   4/30/2018    Jani Langford    MRN: [id number]           Patient Information     Date Of Birth          1980        Visit Information        Provider Department      4/30/2018 8:40 AM Renee Vuong MD [facility] [place]        Today's Diagnoses     Bipolar I disorder (H)           Follow-ups after your visit        Your next 10 appointments already scheduled     May 21, 2018  8:20 AM CDT   (Arrive by 8:05 AM)   Return Visit with Renee Vuong MD   [facility] [place] ([facility] [place] )    [address] [place] [address]  [place] [address]   [phone]              Who to contact     If you have questions or need follow up information about today's clinic visit or your schedule please contact [facility] directly at [phone].  Normal or non-critical lab and imaging results will be communicated to you by MyChart, letter or phone within 4 business days after the clinic has received the results. If you do not hear from us within 7 days, please contact the clinic through MyChart or phone. If you have a critical or abnormal lab result, we will notify you by phone as soon as possible.  Submit refill requests through [employer] or call your pharmacy and they will forward the refill request to us. Please allow 3 business days for your refill to be completed.          Additional Information About Your Visit        MyChart Information     [employer] lets you send messages to your doctor, view your test results, renew your prescriptions, schedule appointments and more. To sign up, go to [email or web address] . Click on \"Log in\" on the left side of the screen, which will take you to the Welcome page. Then click on \"Sign up Now\" on the right side of the page.     You will be asked to enter the access code listed below, as well as some personal information. Please follow the directions to create your username and password.     Your access code is: " [email or web address]  Expires: 2018  3:20 PM     Your access code will  in 90 days. If you need help or a new code, please call your [place] clinic or [phone].        Care EveryWhere ID     This is your Care EveryWhere ID. This could be used by other organizations to access your [place] medical records  [id number]        Your Vitals Were     Pulse Temperature BMI (Body Mass Index)             64 98  F (36.7  C) (Tympanic) 25.9 kg/m2          Blood Pressure from Last 3 Encounters:   18 100/58   18 90/52   18 108/58    Weight from Last 3 Encounters:   18 195 lb (88.5 kg)   18 195 lb (88.5 kg)   18 210 lb (95.3 kg)              Today, you had the following     No orders found for display         Today's Medication Changes          These changes are accurate as of 18  9:26 AM.  If you have any questions, ask your nurse or doctor.               These medicines have changed or have updated prescriptions.        Dose/Directions    * clonazePAM 1 MG tablet   Commonly known as:  klonoPIN   This may have changed:  Another medication with the same name was added. Make sure you understand how and when to take each.   Used for:  Bipolar I disorder (H)   Changed by:  Renee Vuong MD        Dose:  1 mg   Take 1 tablet (1 mg) by mouth At Bedtime   Quantity:  30 tablet   Refills:  3       * clonazePAM 0.5 MG ODT tab   Commonly known as:  klonoPIN   This may have changed:  You were already taking a medication with the same name, and this prescription was added. Make sure you understand how and when to take each.   Used for:  Bipolar I disorder (H)   Changed by:  Renee Vuong MD        Dose:  0.5 mg   Take 1 tablet (0.5 mg) by mouth daily as needed for anxiety   Quantity:  30 tablet   Refills:  2       * Notice:  This list has 2 medication(s) that are the same as other medications prescribed for you. Read the directions carefully, and ask your doctor or other care provider  to review them with you.         Where to get your medicines      Some of these will need a paper prescription and others can be bought over the counter.  Ask your nurse if you have questions.     Bring a paper prescription for each of these medications     clonazePAM 0.5 MG ODT tab                Primary Care Provider Office Phone # Fax #    R Wilmer Mendez [phone] [address] [address]        Equal Access to Services     MIKE SAHU : Hadii aad ku hadasho Soomaali, waaxda luqadaha, qaybta kaalmada adeegyada, waxay idiin hayaan dulce ariasronaldsacha mcleod . So [facility] [phone].    ATENCIÓN: Si habla español, tiene a davis disposición servicios gratuitos de asistencia lingüística. Llame al [phone].    We comply with applicable federal civil rights laws and [place] laws. We do not discriminate on the basis of race, color, national origin, age, disability, sex, sexual orientation, or gender identity.            Thank you!     Thank you for choosing [facility]  for your care. Our goal is always to provide you with excellent care. Hearing back from our patients is one way we can continue to improve our services. Please take a few minutes to complete the written survey that you may receive in the mail after your visit with us. Thank you!             Your Updated Medication List - Protect others around you: Learn how to safely use, store and throw away your medicines at www.disposemymeds.org.          This list is accurate as of 4/30/18  9:26 AM.  Always use your most recent med list.                   Brand Name Dispense Instructions for use Diagnosis    albuterol 108 (90 Base) MCG/ACT Inhaler    PROAIR HFA/PROVENTIL HFA/VENTOLIN HFA    1 Inhaler    Inhale 2 puffs into the lungs every 6 hours as needed for shortness of breath / dyspnea or wheezing    Dyspnea, unspecified type       * clonazePAM 1 MG tablet    klonoPIN    30 tablet    Take 1 tablet (1 mg) by mouth  At Bedtime    Bipolar I disorder (H)       * clonazePAM 0.5 MG ODT tab    klonoPIN    30 tablet    Take 1 tablet (0.5 mg) by mouth daily as needed for anxiety    Bipolar I disorder (H)       omeprazole 20 MG CR capsule    priLOSEC    30 capsule    Take 1 capsule (20 mg) by mouth daily    Gastroesophageal reflux disease without esophagitis       * Notice:  This list has 2 medication(s) that are the same as other medications prescribed for you. Read the directions carefully, and ask your doctor or other care provider to review them with you.

## 2018-05-01 ASSESSMENT — PATIENT HEALTH QUESTIONNAIRE - PHQ9: SUM OF ALL RESPONSES TO PHQ QUESTIONS 1-9: 13

## 2018-05-01 ASSESSMENT — ANXIETY QUESTIONNAIRES: GAD7 TOTAL SCORE: 15

## 2018-05-21 ENCOUNTER — OFFICE VISIT (OUTPATIENT)
Dept: PSYCHIATRY | Facility: OTHER | Age: 38
End: 2018-05-21
Attending: PSYCHIATRY & NEUROLOGY
Payer: COMMERCIAL

## 2018-05-21 VITALS
TEMPERATURE: 97.1 F | BODY MASS INDEX: 23.91 KG/M2 | WEIGHT: 180 LBS | HEART RATE: 68 BPM | DIASTOLIC BLOOD PRESSURE: 56 MMHG | SYSTOLIC BLOOD PRESSURE: 92 MMHG

## 2018-05-21 DIAGNOSIS — F31.10 BIPOLAR I DISORDER, MOST RECENT EPISODE (OR CURRENT) MANIC (H): Primary | ICD-10-CM

## 2018-05-21 LAB
ANION GAP SERPL CALCULATED.3IONS-SCNC: 6 MMOL/L (ref 3–14)
BUN SERPL-MCNC: 8 MG/DL (ref 7–30)
CALCIUM SERPL-MCNC: 9.7 MG/DL (ref 8.5–10.1)
CHLORIDE SERPL-SCNC: 103 MMOL/L (ref 94–109)
CHOLEST SERPL-MCNC: 203 MG/DL
CO2 SERPL-SCNC: 29 MMOL/L (ref 20–32)
CREAT SERPL-MCNC: 1.09 MG/DL (ref 0.66–1.25)
GFR SERPL CREATININE-BSD FRML MDRD: 77 ML/MIN/1.7M2
GLUCOSE SERPL-MCNC: 81 MG/DL (ref 70–99)
HDLC SERPL-MCNC: 24 MG/DL
LDLC SERPL CALC-MCNC: 142 MG/DL
NONHDLC SERPL-MCNC: 179 MG/DL
POTASSIUM SERPL-SCNC: 3.8 MMOL/L (ref 3.4–5.3)
SODIUM SERPL-SCNC: 138 MMOL/L (ref 133–144)
TRIGL SERPL-MCNC: 184 MG/DL
TSH SERPL DL<=0.005 MIU/L-ACNC: 1.27 MU/L (ref 0.4–4)

## 2018-05-21 PROCEDURE — 36415 COLL VENOUS BLD VENIPUNCTURE: CPT | Mod: ZL | Performed by: PSYCHIATRY & NEUROLOGY

## 2018-05-21 PROCEDURE — 84443 ASSAY THYROID STIM HORMONE: CPT | Mod: ZL | Performed by: PSYCHIATRY & NEUROLOGY

## 2018-05-21 PROCEDURE — 80048 BASIC METABOLIC PNL TOTAL CA: CPT | Mod: ZL | Performed by: PSYCHIATRY & NEUROLOGY

## 2018-05-21 PROCEDURE — 80061 LIPID PANEL: CPT | Mod: ZL | Performed by: PSYCHIATRY & NEUROLOGY

## 2018-05-21 PROCEDURE — G0463 HOSPITAL OUTPT CLINIC VISIT: HCPCS

## 2018-05-21 PROCEDURE — 99214 OFFICE O/P EST MOD 30 MIN: CPT | Performed by: PSYCHIATRY & NEUROLOGY

## 2018-05-21 ASSESSMENT — PATIENT HEALTH QUESTIONNAIRE - PHQ9: 5. POOR APPETITE OR OVEREATING: NEARLY EVERY DAY

## 2018-05-21 ASSESSMENT — ANXIETY QUESTIONNAIRES
IF YOU CHECKED OFF ANY PROBLEMS ON THIS QUESTIONNAIRE, HOW DIFFICULT HAVE THESE PROBLEMS MADE IT FOR YOU TO DO YOUR WORK, TAKE CARE OF THINGS AT HOME, OR GET ALONG WITH OTHER PEOPLE: VERY DIFFICULT
3. WORRYING TOO MUCH ABOUT DIFFERENT THINGS: MORE THAN HALF THE DAYS
7. FEELING AFRAID AS IF SOMETHING AWFUL MIGHT HAPPEN: SEVERAL DAYS
5. BEING SO RESTLESS THAT IT IS HARD TO SIT STILL: SEVERAL DAYS
1. FEELING NERVOUS, ANXIOUS, OR ON EDGE: MORE THAN HALF THE DAYS
2. NOT BEING ABLE TO STOP OR CONTROL WORRYING: MORE THAN HALF THE DAYS
6. BECOMING EASILY ANNOYED OR IRRITABLE: SEVERAL DAYS
GAD7 TOTAL SCORE: 12

## 2018-05-21 ASSESSMENT — PAIN SCALES - GENERAL: PAINLEVEL: NO PAIN (0)

## 2018-05-21 NOTE — NURSING NOTE
"Chief Complaint   Patient presents with     [facility]     Mental health.  Lab work.  Discuss Saphris and Latuda today.       Initial BP 92/56 (BP Location: Left arm, Patient Position: Sitting, Cuff Size: Adult Regular)  Pulse 68  Temp 97.1  F (36.2  C) (Tympanic)  Wt 180 lb (81.6 kg)  BMI 23.91 kg/m2 Estimated body mass index is 23.91 kg/(m^2) as calculated from the following:    Height as of 12/18/17: 6' 0.75\" (1.848 m).    Weight as of this encounter: 180 lb (81.6 kg).  Medication Reconciliation: complete    KIERAN GARCIA LPN    "

## 2018-05-21 NOTE — LETTER
May 21, 2018        Hi Erren, your labs checked out pretty good. Your triglycerides continue to come down: awesome! About year ago triglycerides were around 400. LDL up bit since last visit but I'm most looking at triglycerides.     Recent Labs   Lab Test  05/21/18   0856  11/22/17   0914   CHOL  203*  216*   HDL  24*  25*   LDL  142*  132*   TRIG  184*  295*     Thyroid came back normal    TSH   Date Value Ref Range Status   05/21/2018 1.27 0.40 - 4.00 mU/L Final   ]    Last Basic Metabolic Panel:  Lab Results   Component Value Date     05/21/2018      Lab Results   Component Value Date    POTASSIUM 3.8 05/21/2018     Lab Results   Component Value Date    CHLORIDE 103 05/21/2018     Lab Results   Component Value Date    TOO 9.7 05/21/2018     Lab Results   Component Value Date    CO2 29 05/21/2018     Lab Results   Component Value Date    BUN 8 05/21/2018     Lab Results   Component Value Date    CR 1.09 05/21/2018     Lab Results   Component Value Date    GLC 81 05/21/2018     Above are electrolytes and fasting glucose which came back normal.         Sincerely,      Renee Vuong MD

## 2018-05-21 NOTE — PROGRESS NOTES
"  PSYCHIATRY CLINIC PROGRESS NOTE   20 minute medication management, more than 50% of time spent counseling patient on medications, medication side effects, symptom history and management   SUBJECTIVE / INTERIM HISTORY                                                                        Social- with ISABEL Sandhu and her kids Children- Phoebe's kids are 5, 7 , and 12 yo  Last visit: Continue Seroquel 50 mg to 100 mg.  continue Klonopin 1 mg HS and we will add 0.5 mg. Otherwise no meds for now today: he is going to look up saprhis and latuda as that's what I'm thinking as to next for mood stabilizer.    - restless is better since off Abilify. Looked up and he doesn't want to end up with restless, akathisia  - thinking down anad depressed. 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 Just dens't want to end up back to 700 mg like he was before  - Seroquel  helps him sleep a bit   - Klonopin is helping him more with mood , anxiety whereas in past when he took Xanax which \"I'd be sleeping in half hour\"  - has been dx PTSD in past: when was working with Bernabe Browning.....  - aggressive in past while manic.  SUBSTANCE USE- smokes 1/2 PPD since age 17 yo.  past use  Meth will be year in about [employer] clean. , MJ. tx [place] and [employer]. EtOH now rare.    SYMPTOMS- Decreased appetite, low energy, low mood. Insomnia still.  Paranoia has improved. No manic sx.  nightmares, flashbacks, detached, angry outbursts, self-destructive, startle response, hypervigilance and fear Sleep now: about 2-3 hours then up for awhile, then another hour. His goal would be 6 continuous.  MEDICAL ROS- loss off appetite still even though we added Seroquel back. SOB: usually at night. But sometimes during day as well not even with exertion.  increased appetite / weight  MEDICAL / SURGICAL HISTORY                   Patient Active Problem List   Diagnosis     " "Bipolar disorder with psychotic features (H)     Insomnia     History of mental disorder     Attention deficit disorder     Posttraumatic stress disorder     Decreased sex drive     Gastroesophageal reflux disease without esophagitis     ALLERGY   Bee venom; Latex; Percocet [oxycodone-acetaminophen]; Toradol; Tramadol; and Tegretol [carbamazepine]  MEDICATIONS                                                                                             Current Outpatient Prescriptions   Medication Sig     albuterol (PROAIR HFA/PROVENTIL HFA/VENTOLIN HFA) 108 (90 BASE) MCG/ACT Inhaler Inhale 2 puffs into the lungs every 6 hours as needed for shortness of breath / dyspnea or wheezing     clonazePAM (KLONOPIN) 0.5 MG ODT tab Take 1 tablet (0.5 mg) by mouth daily as needed for anxiety     clonazePAM (KLONOPIN) 1 MG tablet Take 1 tablet (1 mg) by mouth At Bedtime     omeprazole (PRILOSEC) 20 MG CR capsule Take 1 capsule (20 mg) by mouth daily     QUEtiapine Fumarate (SEROQUEL PO) Take 100 mg by mouth At Bedtime     No current facility-administered medications for this visit.          PAST MEDICATION TRIALS    Prozac (fluoxetine), Zoloft (sertraline), Paxil (paroxetine) and Cymbalta (duloxetine). Paxil sexual SEs and constipation. Mirtazapine ineffective for insomnia.   Elavil (amitriptyline).   Lithium Carbonate, Depakote and Depakot ER (valproate) and Neurontin. Topamax didn't help (gabapentin). Lithium \"that was like eating pennies and nickels\" but helped / effective. Didn't like the blood level draws with lithium. Doesn't think has been on tegretol or trileptal.   Seroquel (quetiapine).   no older antipsychotics.   Xanax (alprazolam). Doesn't think has been on Buspar   Ambien (zolpidem) and Lunesta (eszopiclone). Lunesta helped initial insomnia, NOT middle. Ambien made him violent and agitated.       VITALS   BP 92/56 (BP Location: Left arm, Patient Position: Sitting, Cuff Size: Adult Regular)  Pulse 68  Temp 97.1  F " "(36.2  C) (Tympanic)  Wt 180 lb (81.6 kg)  BMI 23.91 kg/m2     PHQ9                     [unfilled]  LABS                                                                                                                           Recent Labs   Lab Test  11/22/17   0914  05/08/17   0919   CHOL  216*  258*   HDL  25*  27*   LDL  132*  Cannot estimate LDL when triglyceride exceeds 400 mg/dL   TRIG  295*  403*     Glucose 126 11/22/17 and was 97 5/8/17.      ms (on Seroquel) March 2017    MENTAL STATUS EXAM                                                                                        Alert. Oriented to person, place, and date / time. Casually groomed, calm, cooperative with good eye contact. No problems with speech. Psychomotor: NOT restless as he was last visit. Mood was described as \"okay\" and affect was congruent to speech content and full range. Thought process, including associations, was unremarkable and thought content was devoid of suicidal and homicidal ideation and psychotic thought. No hallucinations. Insight was good. Judgment was intact and adequate for safety. Fund of knowledge was intact. Pt demonstrates no obvious problems with attention, concentration, language, recent or remote memory although these were not formally tested.     ASSESSMENT                                                                                                      HISTORICAL:  Initial psych note 9/14/16        NOTES:  Depakote in past didn't like it. Lithium in past metallic past but did work: doesn't want to go on it again. Trazodone: restless leg. Abilify: restless / akathisia. Tegretol rash  Jani Langford is a 38 yo male with history of bipolar disorder, PTSD and chem dep - on Seroquel total 700 mg and been on for awhile. Main issue is sleep : has not had luck with sleep meds so discussed I will certainly try but given he has not found past meds effective more unlikely we are going to find med that works. " We tried Remeron with no luck. We tried Topamax and no luck. He saw Dr. Garcia  and started on Mirapex. Last visit we were going to try Doxepin but insurance wouldn't cover. We tried Belsomra and didn't help.    Our change of Seroquel to Abilify for Jani went well initially. Then  akathisia is really bad. He has been taking Seroquel 100 mg. I was thinking carbamazepine but he had rash in past. Thinking latuda but Jani deosn't want to take that given risk for akathisia (had bad akthisia wth Abilify). Appetite actually been down even on low dose Seroquel: we agreed today little increase rahter than have him try Saphris our next option.      TREATMENT RISK STATEMENT:  The risks, benefits, alternatives and potential adverse effects have been explained and are understood by the pt.  The pt agrees to the treatment plan with the ability to do so.   The pt knows to call the clinic for any problems or access emergency care if needed.        DIAGNOSES                     Bipolar I disorder with history of psychotic features               PTSD       PLAN                                                                                                                    1)  MEDICATIONS:         --Seroquel we are having him increase form 100 to 150 mg HS.  continue Klonopin 1 mg HS and 0.5 mg. Otherwise we are opting out Latuda and maybe Saprhis in future...    2)  THERAPY:  No Change    3)  LABS:  lipid panel and glucose, LFTs, BMP including fasting glucose were done in May '17 and then in nov '17 as was glucose. EKG 3/2017.  recheck lipid and glucose and tSH toady     4)  PT MONITOR [call for probs]:  Worsening symptoms, SI/HI, SEs from meds    5)  REFERRALS [CD, medical, other]: none    6)  RTC: ~4 weeks

## 2018-05-21 NOTE — MR AVS SNAPSHOT
"              After Visit Summary   5/21/2018    Jani Langford    MRN: [id number]           Patient Information     Date Of Birth          1980        Visit Information        Provider Department      5/21/2018 8:20 AM Renee Vuong MD [facility] [place]        Today's Diagnoses     Bipolar I disorder, most recent episode (or current) manic (H)    -  1       Follow-ups after your visit        Your next 10 appointments already scheduled     Jun 22, 2018  8:40 AM CDT   (Arrive by 8:25 AM)   Return Visit with Renee Vuong MD   [facility] [place] ([facility] [place] )    [address] [place] [id number] [phone]              Who to contact     If you have questions or need follow up information about today's clinic visit or your schedule please [facility] at [phone].  Normal or non-critical lab and imaging results will be communicated to you by MyChart, letter or phone within 4 business days after the clinic has received the results. If you do not hear from us within 7 days, please contact the clinic through MyChart or phone. If you have a critical or abnormal lab result, we will notify you by phone as soon as possible.  Submit refill requests through [employer] or call your pharmacy and they will forward the refill request to us. Please allow 3 business days for your refill to be completed.          Additional Information About Your Visit        MyChart Information     [employer] lets you send messages to your doctor, view your test results, renew your prescriptions, schedule appointments and more. To sign up, go to [email or web address] . Click on \"Log in\" on the left side of the screen, which will take you to the Welcome page. Then click on \"Sign up Now\" on the right side of the page.     You will be asked to enter the access code listed below, as well as some personal information. Please follow the directions to create your " username and password.     Your access code is: [email or web address]  Expires: 2018  3:20 PM     Your access code will  in 90 days. If you need help or a new code, please call your [facility] clinic or [phone].        Care EveryWhere ID     This is your Care EveryWhere ID. This could be used by other organizations to access your [place] medical records  [id number]        Your Vitals Were     Pulse Temperature BMI (Body Mass Index)             68 97.1  F (36.2  C) (Tympanic) 23.91 kg/m2          Blood Pressure from Last 3 Encounters:   18 92/56   18 100/58   18 90/52    Weight from Last 3 Encounters:   18 180 lb (81.6 kg)   18 195 lb (88.5 kg)   18 195 lb (88.5 kg)              We Performed the Following     Basic metabolic panel     Lipid Profile     TSH with free T4 reflex        Primary Care Provider Office Phone # Fax #    R Wilmer Mendez [phone]       [facility] [address] [place] [address]        Equal Access to Services     [facility] AH: Hadii aad ku hadasho Soomaali, waaxda luqadaha, qaybta kaalmada adeegyada, oh [facility]cleod . So [facility] [phone].    ATENCIÓN: Si habla español, tiene a davis disposición servicios gratuitos de asistencia lingüística. Llame al [phone].    We comply with applicable federal civil rights laws and [place] laws. We do not discriminate on the basis of race, color, national origin, age, disability, sex, sexual orientation, or gender identity.            Thank you!     Thank you for choosing [facility]  for your care. Our goal is always to provide you with excellent care. Hearing back from our patients is one way we can continue to improve our services. Please take a few minutes to complete the written survey that you may receive in the mail after your visit with us. Thank you!             Your Updated Medication List - Protect others around you: Learn how to safely use, store  and throw away your medicines at www.disposemymeds.org.          This list is accurate as of 5/21/18  8:56 AM.  Always use your most recent med list.                   Brand Name Dispense Instructions for use Diagnosis    albuterol 108 (90 Base) MCG/ACT Inhaler    PROAIR HFA/PROVENTIL HFA/VENTOLIN HFA    1 Inhaler    Inhale 2 puffs into the lungs every 6 hours as needed for shortness of breath / dyspnea or wheezing    Dyspnea, unspecified type       * clonazePAM 1 MG tablet    klonoPIN    30 tablet    Take 1 tablet (1 mg) by mouth At Bedtime    Bipolar I disorder (H)       * clonazePAM 0.5 MG ODT tab    klonoPIN    30 tablet    Take 1 tablet (0.5 mg) by mouth daily as needed for anxiety    Bipolar I disorder (H)       omeprazole 20 MG CR capsule    priLOSEC    30 capsule    Take 1 capsule (20 mg) by mouth daily    Gastroesophageal reflux disease without esophagitis       SEROQUEL PO      Take 100 mg by mouth At Bedtime        * Notice:  This list has 2 medication(s) that are the same as other medications prescribed for you. Read the directions carefully, and ask your doctor or other care provider to review them with you.

## 2018-05-22 ASSESSMENT — PATIENT HEALTH QUESTIONNAIRE - PHQ9: SUM OF ALL RESPONSES TO PHQ QUESTIONS 1-9: 13

## 2018-05-22 ASSESSMENT — ANXIETY QUESTIONNAIRES: GAD7 TOTAL SCORE: 12

## 2018-06-22 ENCOUNTER — OFFICE VISIT (OUTPATIENT)
Dept: PSYCHIATRY | Facility: OTHER | Age: 38
End: 2018-06-22
Attending: PSYCHIATRY & NEUROLOGY
Payer: COMMERCIAL

## 2018-06-22 VITALS
DIASTOLIC BLOOD PRESSURE: 64 MMHG | BODY MASS INDEX: 23.91 KG/M2 | WEIGHT: 180 LBS | HEART RATE: 78 BPM | TEMPERATURE: 97.1 F | SYSTOLIC BLOOD PRESSURE: 106 MMHG

## 2018-06-22 DIAGNOSIS — F31.30 BIPOLAR I DISORDER, MOST RECENT EPISODE DEPRESSED (H): Primary | ICD-10-CM

## 2018-06-22 DIAGNOSIS — F31.9 BIPOLAR I DISORDER (H): ICD-10-CM

## 2018-06-22 PROCEDURE — 99214 OFFICE O/P EST MOD 30 MIN: CPT | Performed by: PSYCHIATRY & NEUROLOGY

## 2018-06-22 PROCEDURE — G0463 HOSPITAL OUTPT CLINIC VISIT: HCPCS

## 2018-06-22 RX ORDER — CLONAZEPAM 1 MG/1
1 TABLET ORAL AT BEDTIME
Qty: 30 TABLET | Refills: 3 | Status: SHIPPED | OUTPATIENT
Start: 2018-06-22 | End: 2018-11-14

## 2018-06-22 RX ORDER — QUETIAPINE FUMARATE 50 MG/1
50 TABLET, EXTENDED RELEASE ORAL AT BEDTIME
Qty: 30 EACH | Refills: 3 | Status: SHIPPED | OUTPATIENT
Start: 2018-06-22 | End: 2018-09-13

## 2018-06-22 RX ORDER — QUETIAPINE 200 MG/1
200 TABLET, FILM COATED, EXTENDED RELEASE ORAL AT BEDTIME
Qty: 30 TABLET | Refills: 3 | Status: SHIPPED | OUTPATIENT
Start: 2018-06-22 | End: 2018-08-28 | Stop reason: DRUGHIGH

## 2018-06-22 ASSESSMENT — ANXIETY QUESTIONNAIRES
IF YOU CHECKED OFF ANY PROBLEMS ON THIS QUESTIONNAIRE, HOW DIFFICULT HAVE THESE PROBLEMS MADE IT FOR YOU TO DO YOUR WORK, TAKE CARE OF THINGS AT HOME, OR GET ALONG WITH OTHER PEOPLE: VERY DIFFICULT
1. FEELING NERVOUS, ANXIOUS, OR ON EDGE: MORE THAN HALF THE DAYS
GAD7 TOTAL SCORE: 16
5. BEING SO RESTLESS THAT IT IS HARD TO SIT STILL: SEVERAL DAYS
6. BECOMING EASILY ANNOYED OR IRRITABLE: NEARLY EVERY DAY
2. NOT BEING ABLE TO STOP OR CONTROL WORRYING: NEARLY EVERY DAY
7. FEELING AFRAID AS IF SOMETHING AWFUL MIGHT HAPPEN: SEVERAL DAYS
3. WORRYING TOO MUCH ABOUT DIFFERENT THINGS: NEARLY EVERY DAY

## 2018-06-22 ASSESSMENT — PAIN SCALES - GENERAL: PAINLEVEL: NO PAIN (0)

## 2018-06-22 ASSESSMENT — PATIENT HEALTH QUESTIONNAIRE - PHQ9: 5. POOR APPETITE OR OVEREATING: NEARLY EVERY DAY

## 2018-06-22 NOTE — PROGRESS NOTES
"  PSYCHIATRY CLINIC PROGRESS NOTE   20 minute medication management, more than 50% of time spent counseling patient on medications, medication side effects, symptom history and management   SUBJECTIVE / INTERIM HISTORY                                                                        Social- with ISABEL Sandhu and her kids Children- Phoebe's kids are 5, 7 , and 12 yo. Ozzy is the 13 yo Wiener dog  Last visit 5/21/18: -Seroquel we are having him increase form 100 to 150 mg HS.  continue Klonopin 1 mg HS and 0.5 mg. Otherwise we are opting out Latuda and maybe Saprhis in future...    - sleeping, \"hiding\", been in a funk, still is in fact. Incident couple weeks ago got upset , took off  - poor appetite up to 3 days  - camping coming up to [place]  - Seroquel  helps him sleep a bit   - Klonopin is helping him more with mood , anxiety whereas in past when he took Xanax which \"I'd be sleeping in half hour\"  - has been dx PTSD in past: when was working with Bernabe Browning.....  - aggressive in past while manic.  SUBSTANCE USE- smokes 1/2 PPD since age 15 yo.  past use  Meth: ~2 years clean. , MJ. TX: [place] and [employer]. EtOH now rare.    SYMPTOMS- Decreased appetite, low energy, low mood. Insomnia still.  Passive SI: no intent or plan. Paranoia has improved. No manic sx.  nightmares, flashbacks, detached, angry outbursts, self-destructive, startle response, hypervigilance and fear Sleep now: about 2-3 hours then up for awhile, then another hour. His goal would be 6 continuous.  MEDICAL ROS- loss off appetite still even though we added Seroquel back. SOB: usually at night. But sometimes during day as well not even with exertion.  increased appetite / weight  MEDICAL / SURGICAL HISTORY                   Patient Active Problem List   Diagnosis     Bipolar disorder with psychotic features (H)     Insomnia     History of mental disorder     Attention deficit disorder     Posttraumatic stress disorder     Decreased sex " "drive     Gastroesophageal reflux disease without esophagitis     ALLERGY   Bee venom; Latex; Percocet [oxycodone-acetaminophen]; Toradol; Tramadol; and Tegretol [carbamazepine]  MEDICATIONS                                                                                             Current Outpatient Prescriptions   Medication Sig     albuterol (PROAIR HFA/PROVENTIL HFA/VENTOLIN HFA) 108 (90 BASE) MCG/ACT Inhaler Inhale 2 puffs into the lungs every 6 hours as needed for shortness of breath / dyspnea or wheezing     clonazePAM (KLONOPIN) 0.5 MG ODT tab Take 1 tablet (0.5 mg) by mouth daily as needed for anxiety     clonazePAM (KLONOPIN) 1 MG tablet Take 1 tablet (1 mg) by mouth At Bedtime     omeprazole (PRILOSEC) 20 MG CR capsule Take 1 capsule (20 mg) by mouth daily     QUEtiapine Fumarate (SEROQUEL PO) Take 100 mg by mouth At Bedtime     No current facility-administered medications for this visit.          PAST MEDICATION TRIALS    Prozac (fluoxetine), Zoloft (sertraline), Paxil (paroxetine) and Cymbalta (duloxetine). Paxil sexual SEs and constipation. Mirtazapine ineffective for insomnia.   Elavil (amitriptyline).   Lithium Carbonate, Depakote and Depakot ER (valproate) and Neurontin. Topamax didn't help (gabapentin). Lithium \"that was like eating pennies and nickels\" but helped / effective. Didn't like the blood level draws with lithium. Doesn't think has been on tegretol or trileptal.   Seroquel (quetiapine)., Abilify   no older antipsychotics.   Xanax (alprazolam). Doesn't think has been on Buspar   Ambien (zolpidem) and Lunesta (eszopiclone). Lunesta helped initial insomnia, NOT middle. Ambien made him violent and agitated.       VITALS   /64 (BP Location: Right arm, Patient Position: Sitting, Cuff Size: Adult Regular)  Pulse 78  Temp 97.1  F (36.2  C) (Tympanic)  Wt 180 lb (81.6 kg)  BMI 23.91 kg/m2     PHQ9                     [unfilled]  LABS                                                  "                                                                          Recent Labs   Lab Test  05/21/18   0856  11/22/17   0914   CHOL  203*  216*   HDL  24*  25*   LDL  142*  132*   TRIG  184*  295*     Lab Results   Component Value Date    TSH 1.27 05/21/2018     Last Basic Metabolic Panel:  Lab Results   Component Value Date     05/21/2018      Lab Results   Component Value Date    POTASSIUM 3.8 05/21/2018     Lab Results   Component Value Date    CHLORIDE 103 05/21/2018     Lab Results   Component Value Date    TOO 9.7 05/21/2018     Lab Results   Component Value Date    CO2 29 05/21/2018     Lab Results   Component Value Date    BUN 8 05/21/2018     Lab Results   Component Value Date    CR 1.09 05/21/2018     Lab Results   Component Value Date    GLC 81 05/21/2018          ms (on Seroquel) March 2017    MENTAL STATUS EXAM                                                                                        Alert. Oriented to person, place, and date / time. Casually groomed, calm, cooperative with good eye contact. No problems with speech. Psychomotor: NOT restless as he was last visit. Mood was depressed and affect was congruent to speech content and full range. Thought process, including associations, was unremarkable and thought content was devoid of suicidal and homicidal ideation and psychotic thought. No hallucinations. Insight was good. Judgment was intact and adequate for safety. Fund of knowledge was intact. Pt demonstrates no obvious problems with attention, concentration, language, recent or remote memory although these were not formally tested.     ASSESSMENT                                                                                                      HISTORICAL:  Initial psych note 9/14/16        NOTES:  Depakote in past didn't like it. Lithium in past metallic past but did work: doesn't want to go on it again. Trazodone: restless leg. Abilify: restless / akathisia. Tegretol  papi Langford is a 37 yo male with history of bipolar disorder, PTSD and chem dep - on Seroquel total 700 mg and been on for awhile. Main issue is sleep : has not had luck with sleep meds so discussed I will certainly try but given he has not found past meds effective more unlikely we are going to find med that works. We tried Remeron with no luck. We tried Topamax and no luck. He saw Dr. Garcia  and started on Mirapex.We tried Belsomra and didn't help.    Our change of Seroquel to Abilify for Jani went well initially. Then  akathisia really bad. He has been taking Seroquel 150 mg. I was thinking carbamazepine but he had rash in past. Thinking latuda but Jani deosn't want to take that given risk for akathisia (had bad akthisia wth Abilify). Appetite actually been down even on low dose Seroquel: we agreed today little increase rahter than have him try Saphris our next option. He's on the fence as to increase Seroquel or switch. We together agreed we are not planning on going any higher than 300 mg. So today we will increase by 50 mg over week or so       TREATMENT RISK STATEMENT:  The risks, benefits, alternatives and potential adverse effects have been explained and are understood by the pt.  The pt agrees to the treatment plan with the ability to do so.   The pt knows to call the clinic for any problems or access emergency care if needed.        DIAGNOSES                    Bipolar I disorder with history of psychotic features  PTSD       PLAN                                                                                                                    1)  MEDICATIONS:         --Seroquel  mg HS increase to 200 mg HS for 5 days then increase to 250 mg HS.  continue Klonopin 1 mg HS and 0.5 mg. Otherwise we are opting out Latuda and maybe Saprhis in future...    2)  THERAPY:  No Change    3)  LABS:  lipid panel and glucose, LFTs, BMP including fasting glucose were done in May '17 and then in nov  '17 as was glucose. EKG 3/2017.  recheck lipid and glucose and tSH toady     4)  PT MONITOR [call for probs]:  Worsening symptoms, SI/HI, SEs from meds    5)  REFERRALS [CD, medical, other]: none    6)  RTC: ~3-4 weeks

## 2018-06-22 NOTE — NURSING NOTE
"Chief Complaint   Patient presents with     RECHECK     Mental   Patient c/o hiding, avoiding human contact, feeling irritable and wanting to sleep more.       Initial /64 (BP Location: Right arm, Patient Position: Sitting, Cuff Size: Adult Regular)  Pulse 78  Temp 97.1  F (36.2  C) (Tympanic)  Wt 180 lb (81.6 kg)  BMI 23.91 kg/m2 Estimated body mass index is 23.91 kg/(m^2) as calculated from the following:    Height as of 12/18/17: 6' 0.75\" (1.848 m).    Weight as of this encounter: 180 lb (81.6 kg).  Medication Reconciliation: complete    KIERAN GARCIA LPN    "

## 2018-06-22 NOTE — MR AVS SNAPSHOT
After Visit Summary   6/22/2018    Jani Langford    MRN: [id number]           Patient Information     Date Of Birth          1980        Visit Information        Provider Department      6/22/2018 9:20 AM Renee Vuong MD [place] [facility]        Today's Diagnoses     Bipolar I disorder, most recent episode depressed (H)    -  1    Bipolar I disorder (H)           Follow-ups after your visit        Your next 10 appointments already scheduled     Jul 13, 2018  9:20 AM CDT   (Arrive by 9:05 AM)   Return Visit with MD Katie [facility] ([facility] )    [address] [place] [id number] [phone]              Who to contact     If you have questions or need follow up information about today's clinic visit or your schedule please contact [facility] directly at [phone].  Normal or non-critical lab and imaging results will be communicated to you by MyChart, letter or phone within 4 business days after the clinic has received the results. If you do not hear from us within 7 days, please contact the clinic through MyChart or phone. If you have a critical or abnormal lab result, we will notify you by phone as soon as possible.  Submit refill requests through [employer] or call your pharmacy and they will forward the refill request to us. Please allow 3 business days for your refill to be completed.          Additional Information About Your Visit        Care EveryWhere ID     This is your Care EveryWhere ID. This could be used by other organizations to access your [place] medical records  [id number]        Your Vitals Were     Pulse Temperature BMI (Body Mass Index)             78 97.1  F (36.2  C) (Tympanic) 23.91 kg/m2          Blood Pressure from Last 3 Encounters:   06/22/18 106/64   05/21/18 92/56   04/30/18 100/58    Weight from Last 3 Encounters:   06/22/18 180 lb (81.6 kg)   05/21/18 180 lb (81.6 kg)    04/30/18 195 lb (88.5 kg)              Today, you had the following     No orders found for display         Today's Medication Changes          These changes are accurate as of 6/22/18  9:59 AM.  If you have any questions, ask your nurse or doctor.               These medicines have changed or have updated prescriptions.        Dose/Directions    * SEROQUEL PO   This may have changed:  Another medication with the same name was added. Make sure you understand how and when to take each.   Changed by:  Renee Vuong MD        Dose:  100 mg   Take 100 mg by mouth At Bedtime   Refills:  0       * QUEtiapine 200 MG 24 hr tablet   Commonly known as:  SEROQUEL XR   This may have changed:  You were already taking a medication with the same name, and this prescription was added. Make sure you understand how and when to take each.   Used for:  Bipolar I disorder, most recent episode depressed (H)   Changed by:  Renee Vuong MD        Dose:  200 mg   Take 1 tablet (200 mg) by mouth At Bedtime For 5 days then add the 50 mg tab for total of 250 mg   Quantity:  30 tablet   Refills:  3       * QUEtiapine 50 MG Tb24 24 hr tablet   Commonly known as:  SEROQUEL XR   This may have changed:  You were already taking a medication with the same name, and this prescription was added. Make sure you understand how and when to take each.   Used for:  Bipolar I disorder, most recent episode depressed (H)   Changed by:  Renee Vuong MD        Dose:  50 mg   Take 1 tablet (50 mg) by mouth At Bedtime Along with 200 mg tabs   Quantity:  30 each   Refills:  3       * Notice:  This list has 3 medication(s) that are the same as other medications prescribed for you. Read the directions carefully, and ask your doctor or other care provider to review them with you.         Where to get your medicines      These medications were sent to [id number] - [place] - [address]  [address] [place] [address]      Phone:  [phone]     QUEtiapine 200 MG 24 hr tablet    QUEtiapine 50 MG Tb24 24 hr tablet         Some of these will need a paper prescription and others can be bought over the counter.  Ask your nurse if you have questions.     Bring a paper prescription for each of these medications     clonazePAM 1 MG tablet                Primary Care Provider Office Phone # Fax #    R Wilmer Mendez [phone]       [facility] [address]        Equal Access to Services     [facility] ADELINA : Hadii aad ku hadasho Soomaali, waaxda luqadaha, qaybta kaalmada adeegyada, waxay idiin hayaan adeeg kharasacha card. So [facility] [phone].    ATENCIÓN: Si habla español, tiene a davis disposición servicios gratuitos de asistencia lingüística. [facility] [phone].    We comply with applicable federal civil rights laws and [place] laws. We do not discriminate on the basis of race, color, national origin, age, disability, sex, sexual orientation, or gender identity.            Thank you!     Thank you for choosing [facility]  for your care. Our goal is always to provide you with excellent care. Hearing back from our patients is one way we can continue to improve our services. Please take a few minutes to complete the written survey that you may receive in the mail after your visit with us. Thank you!             Your Updated Medication List - Protect others around you: Learn how to safely use, store and throw away your medicines at www.disposemymeds.org.          This list is accurate as of 6/22/18  9:59 AM.  Always use your most recent med list.                   Brand Name Dispense Instructions for use Diagnosis    albuterol 108 (90 Base) MCG/ACT Inhaler    PROAIR HFA/PROVENTIL HFA/VENTOLIN HFA    1 Inhaler    Inhale 2 puffs into the lungs every 6 hours as needed for shortness of breath / dyspnea or wheezing    Dyspnea, unspecified type       * clonazePAM 0.5 MG ODT tab    klonoPIN    30  tablet    Take 1 tablet (0.5 mg) by mouth daily as needed for anxiety    Bipolar I disorder (H)       * clonazePAM 1 MG tablet    klonoPIN    30 tablet    Take 1 tablet (1 mg) by mouth At Bedtime    Bipolar I disorder (H)       omeprazole 20 MG CR capsule    priLOSEC    30 capsule    Take 1 capsule (20 mg) by mouth daily    Gastroesophageal reflux disease without esophagitis       * SEROQUEL PO      Take 100 mg by mouth At Bedtime        * QUEtiapine 200 MG 24 hr tablet    SEROQUEL XR    30 tablet    Take 1 tablet (200 mg) by mouth At Bedtime For 5 days then add the 50 mg tab for total of 250 mg    Bipolar I disorder, most recent episode depressed (H)       * QUEtiapine 50 MG Tb24 24 hr tablet    SEROQUEL XR    30 each    Take 1 tablet (50 mg) by mouth At Bedtime Along with 200 mg tabs    Bipolar I disorder, most recent episode depressed (H)       * Notice:  This list has 5 medication(s) that are the same as other medications prescribed for you. Read the directions carefully, and ask your doctor or other care provider to review them with you.

## 2018-06-23 ASSESSMENT — ANXIETY QUESTIONNAIRES: GAD7 TOTAL SCORE: 16

## 2018-06-23 ASSESSMENT — PATIENT HEALTH QUESTIONNAIRE - PHQ9: SUM OF ALL RESPONSES TO PHQ QUESTIONS 1-9: 17

## 2018-07-13 ENCOUNTER — OFFICE VISIT (OUTPATIENT)
Dept: PSYCHIATRY | Facility: OTHER | Age: 38
End: 2018-07-13
Attending: PSYCHIATRY & NEUROLOGY
Payer: COMMERCIAL

## 2018-07-13 VITALS
TEMPERATURE: 98.8 F | SYSTOLIC BLOOD PRESSURE: 110 MMHG | BODY MASS INDEX: 24.04 KG/M2 | WEIGHT: 181 LBS | HEART RATE: 88 BPM | DIASTOLIC BLOOD PRESSURE: 58 MMHG

## 2018-07-13 DIAGNOSIS — F31.30 BIPOLAR I DISORDER, MOST RECENT EPISODE DEPRESSED (H): Primary | ICD-10-CM

## 2018-07-13 PROCEDURE — G0463 HOSPITAL OUTPT CLINIC VISIT: HCPCS

## 2018-07-13 PROCEDURE — 99214 OFFICE O/P EST MOD 30 MIN: CPT | Performed by: PSYCHIATRY & NEUROLOGY

## 2018-07-13 RX ORDER — QUETIAPINE 300 MG/1
300 TABLET, FILM COATED, EXTENDED RELEASE ORAL AT BEDTIME
Qty: 30 TABLET | Refills: 3 | Status: SHIPPED | OUTPATIENT
Start: 2018-07-13 | End: 2018-11-19

## 2018-07-13 ASSESSMENT — PAIN SCALES - GENERAL: PAINLEVEL: NO PAIN (0)

## 2018-07-13 ASSESSMENT — ANXIETY QUESTIONNAIRES
GAD7 TOTAL SCORE: 12
5. BEING SO RESTLESS THAT IT IS HARD TO SIT STILL: SEVERAL DAYS
2. NOT BEING ABLE TO STOP OR CONTROL WORRYING: SEVERAL DAYS
3. WORRYING TOO MUCH ABOUT DIFFERENT THINGS: NEARLY EVERY DAY
1. FEELING NERVOUS, ANXIOUS, OR ON EDGE: MORE THAN HALF THE DAYS
IF YOU CHECKED OFF ANY PROBLEMS ON THIS QUESTIONNAIRE, HOW DIFFICULT HAVE THESE PROBLEMS MADE IT FOR YOU TO DO YOUR WORK, TAKE CARE OF THINGS AT HOME, OR GET ALONG WITH OTHER PEOPLE: VERY DIFFICULT
6. BECOMING EASILY ANNOYED OR IRRITABLE: MORE THAN HALF THE DAYS
7. FEELING AFRAID AS IF SOMETHING AWFUL MIGHT HAPPEN: NOT AT ALL

## 2018-07-13 ASSESSMENT — PATIENT HEALTH QUESTIONNAIRE - PHQ9: 5. POOR APPETITE OR OVEREATING: NEARLY EVERY DAY

## 2018-07-13 NOTE — PROGRESS NOTES
"  PSYCHIATRY CLINIC PROGRESS NOTE   20 minute medication management, more than 50% of time spent counseling patient on medications, medication side effects, symptom history and management   SUBJECTIVE / INTERIM HISTORY                                                                        Social- with ISABEL Sandhu and her kids Children- Phoebe's kids are 5, 7 , and 10 yo. Ozzy is the 13 yo Wiener dog  Last visit 5/21/18: -Seroquel we are having him increase form 100 to 150 mg HS.  continue Klonopin 1 mg HS and 0.5 mg. Otherwise we are opting out Latuda and maybe Saprhis in future...    - is takig 300 mg Seroquel and feels is good for now. Is NOT helping him sleep though. Does feel mood is better.   - appetite is better / feels comfortable with weight  - camping [place] coming up  -    - Klonopin is helping him more with mood , anxiety whereas in past when he took Xanax which \"I'd be sleeping in half hour\"  - has been dx PTSD in past: when was working with Bernabe Browning.....  - aggressive in past while manic.  SUBSTANCE USE- smokes 1/2 PPD since age 15 yo.  past use  Meth: ~2 years clean. , MJ. TX: [place] and [employer]. EtOH now rare.    SYMPTOMS- Decreased appetite, low energy, low mood. Insomnia still.  Passive SI: no intent or plan. Paranoia has improved. No manic sx.  nightmares, flashbacks, detached, angry outbursts, self-destructive, startle response, hypervigilance and fear Sleep now: about 2-3 hours then up for awhile, then another hour. His goal would be 6 continuous.  MEDICAL ROS- loss off appetite still even though we added Seroquel back. SOB: usually at night. But sometimes during day as well not even with exertion.  increased appetite / weight  MEDICAL / SURGICAL HISTORY                   Patient Active Problem List   Diagnosis     Bipolar disorder with psychotic features (H)     Insomnia     History of mental disorder     Attention deficit disorder     Posttraumatic stress disorder     Decreased sex " "drive     Gastroesophageal reflux disease without esophagitis     ALLERGY   Bee venom; Latex; Percocet [oxycodone-acetaminophen]; Toradol; Tramadol; and Tegretol [carbamazepine]  MEDICATIONS                                                                                             Current Outpatient Prescriptions   Medication Sig     albuterol (PROAIR HFA/PROVENTIL HFA/VENTOLIN HFA) 108 (90 BASE) MCG/ACT Inhaler Inhale 2 puffs into the lungs every 6 hours as needed for shortness of breath / dyspnea or wheezing     clonazePAM (KLONOPIN) 0.5 MG ODT tab Take 1 tablet (0.5 mg) by mouth daily as needed for anxiety     clonazePAM (KLONOPIN) 1 MG tablet Take 1 tablet (1 mg) by mouth At Bedtime     omeprazole (PRILOSEC) 20 MG CR capsule Take 1 capsule (20 mg) by mouth daily     QUEtiapine (SEROQUEL XR) 200 MG 24 hr tablet Take 1 tablet (200 mg) by mouth At Bedtime For 5 days then add the 50 mg tab for total of 250 mg     QUEtiapine (SEROQUEL XR) 50 MG TB24 24 hr tablet Take 1 tablet (50 mg) by mouth At Bedtime Along with 200 mg tabs     [DISCONTINUED] QUEtiapine Fumarate (SEROQUEL PO) Take 100 mg by mouth At Bedtime     No current facility-administered medications for this visit.          PAST MEDICATION TRIALS    Prozac (fluoxetine), Zoloft (sertraline), Paxil (paroxetine) and Cymbalta (duloxetine). Paxil sexual SEs and constipation. Mirtazapine ineffective for insomnia.   Elavil (amitriptyline).   Lithium Carbonate, Depakote and Depakot ER (valproate) and Neurontin. Topamax didn't help (gabapentin). Lithium \"that was like eating pennies and nickels\" but helped / effective. Didn't like the blood level draws with lithium. Doesn't think has been on tegretol or trileptal.   Seroquel (quetiapine)., Abilify   no older antipsychotics.   Xanax (alprazolam). Doesn't think has been on Buspar   Ambien (zolpidem) and Lunesta (eszopiclone). Lunesta helped initial insomnia, NOT middle. Ambien made him violent and agitated. " "      VITALS   /58 (BP Location: Left arm, Patient Position: Sitting, Cuff Size: Adult Regular)  Pulse 88  Temp 98.8  F (37.1  C) (Tympanic)  Wt 181 lb (82.1 kg)  BMI 24.04 kg/m2     PHQ9                     [unfilled]  LABS                                                                                                                           Recent Labs   Lab Test  05/21/18   0856  11/22/17   0914   CHOL  203*  216*   HDL  24*  25*   LDL  142*  132*   TRIG  184*  295*     Lab Results   Component Value Date    TSH 1.27 05/21/2018     Last Basic Metabolic Panel:  Lab Results   Component Value Date     05/21/2018      Lab Results   Component Value Date    POTASSIUM 3.8 05/21/2018     Lab Results   Component Value Date    CHLORIDE 103 05/21/2018     Lab Results   Component Value Date    TOO 9.7 05/21/2018     Lab Results   Component Value Date    CO2 29 05/21/2018     Lab Results   Component Value Date    BUN 8 05/21/2018     Lab Results   Component Value Date    CR 1.09 05/21/2018     Lab Results   Component Value Date    GLC 81 05/21/2018          ms (on Seroquel) March 2017    MENTAL STATUS EXAM                                                                                        Alert. Oriented to person, place, and date / time. Casually groomed, calm, cooperative with good eye contact. No problems with speech. Psychomotor: NOT restless as he was last visit. Mood was depressed but described as \"doing better though\"  and affect was congruent to speech content and full range. Thought process, including associations, was unremarkable and thought content was devoid of suicidal and homicidal ideation and psychotic thought. No hallucinations. Insight was good. Judgment was intact and adequate for safety. Fund of knowledge was intact. Pt demonstrates no obvious problems with attention, concentration, language, recent or remote memory although these were not formally tested.     ASSESSMENT   "                                                                                                    HISTORICAL:  Initial psych note 9/14/16        NOTES:  Depakote in past didn't like it. Lithium in past metallic past but did work: doesn't want to go on it again. Trazodone: restless leg. Abilify: restless / akathisia. Tegretol rash  Jani Langford is a 39 yo male with history of bipolar disorder, PTSD and chem dep - on Seroquel total 700 mg and been on for awhile. Main issue is sleep : has not had luck with sleep meds so discussed I will certainly try but given he has not found past meds effective more unlikely we are going to find med that works. We tried Remeron with no luck. We tried Topamax and no luck. He saw Dr. Garcia  and started on Mirapex.We tried Belsomra and didn't help.    Our change of Seroquel to Abilify for Jani went well initially. Then  akathisia really bad. He has been taking Seroquel 150 mg. I was thinking carbamazepine but he had rash in past. Thinking latuda but Jani deosn't want to take that given risk for akathisia (had bad akthisia wth Abilify). Appetite actually been down even on low dose Seroquel: last visit we increased rather than have him try Saphris our next option. He was on the fence as to increase Seroquel or switch but today he notes takng 300 mg and feels doing better in comparison to past couple months.        TREATMENT RISK STATEMENT:  The risks, benefits, alternatives and potential adverse effects have been explained and are understood by the pt.  The pt agrees to the treatment plan with the ability to do so.   The pt knows to call the clinic for any problems or access emergency care if needed.        DIAGNOSES                    Bipolar I disorder with history of psychotic features  PTSD       PLAN                                                                                                                    1)  MEDICATIONS:         --Seroquel  mg HS and I will fill.    continue Klonopin 1 mg HS and 0.5 mg. Otherwise we are opting out Latuda and maybe Saprhis in future...    2)  THERAPY:  No Change    3)  LABS:  lipid panel and glucose, LFTs, BMP including fasting glucose were done in May '17 and then in nov '17 as was glucose. EKG 3/2017.  recheck lipid and glucose and tSH toady     4)  PT MONITOR [call for probs]:  Worsening symptoms, SI/HI, SEs from meds    5)  REFERRALS [CD, medical, other]: none    6)  RTC: ~1 month

## 2018-07-13 NOTE — NURSING NOTE
"Chief Complaint   Patient presents with     RECHECK     Mental health.  Patient states that depression is better with the increase in Seroquel.  C/o sleep issues       Initial /58 (BP Location: Left arm, Patient Position: Sitting, Cuff Size: Adult Regular)  Pulse 88  Temp 98.8  F (37.1  C) (Tympanic)  Wt 181 lb (82.1 kg)  BMI 24.04 kg/m2 Estimated body mass index is 24.04 kg/(m^2) as calculated from the following:    Height as of 12/18/17: 6' 0.75\" (1.848 m).    Weight as of this encounter: 181 lb (82.1 kg).  Medication Reconciliation: complete    KIERAN GARCIA LPN    "

## 2018-07-13 NOTE — MR AVS SNAPSHOT
After Visit Summary   7/13/2018    Jani Langford    MRN: [id number]           Patient Information     Date Of Birth          1980        Visit Information        Provider Department      7/13/2018 9:20 AM Renee Vuong MD [facility] [place]        Today's Diagnoses     Bipolar I disorder, most recent episode depressed (H)    -  1       Follow-ups after your visit        Your next 10 appointments already scheduled     Aug 13, 2018 10:20 AM CDT   (Arrive by 10:05 AM)   Return Visit with Renee Vuong MD   [facility] [place] ([facility] [place] [facility] [place] )    [address] [place] [address]  [place] [id number] [phone]              Who to contact     If you have questions or need follow up information about today's clinic visit or your schedule please contact [facility] directly at [phone].  Normal or non-critical lab and imaging results will be communicated to you by MyChart, letter or phone within 4 business days after the clinic has received the results. If you do not hear from us within 7 days, please contact the clinic through MyChart or phone. If you have a critical or abnormal lab result, we will notify you by phone as soon as possible.  Submit refill requests through ELVPHD or call your pharmacy and they will forward the refill request to us. Please allow 3 business days for your refill to be completed.          Additional Information About Your Visit        Care EveryWhere ID     This is your Care EveryWhere ID. This could be used by other organizations to access your [place] medical records  [id number]        Your Vitals Were     Pulse Temperature BMI (Body Mass Index)             88 98.8  F (37.1  C) (Tympanic) 24.04 kg/m2          Blood Pressure from Last 3 Encounters:   07/13/18 110/58   06/22/18 106/64   05/21/18 92/56    Weight from Last 3 Encounters:   07/13/18 181 lb (82.1 kg)   06/22/18 180 lb (81.6 kg)   05/21/18 180 lb (81.6 kg)               Today, you had the following     No orders found for display         Today's Medication Changes          These changes are accurate as of 7/13/18 10:16 AM.  If you have any questions, ask your nurse or doctor.               These medicines have changed or have updated prescriptions.        Dose/Directions    * QUEtiapine 200 MG 24 hr tablet   Commonly known as:  SEROQUEL XR   This may have changed:    - Another medication with the same name was added. Make sure you understand how and when to take each.  - Another medication with the same name was removed. Continue taking this medication, and follow the directions you see here.   Used for:  Bipolar I disorder, most recent episode depressed (H)   Changed by:  Renee Vuong MD        Dose:  200 mg   Take 1 tablet (200 mg) by mouth At Bedtime For 5 days then add the 50 mg tab for total of 250 mg   Quantity:  30 tablet   Refills:  3       * QUEtiapine 50 MG Tb24 24 hr tablet   Commonly known as:  SEROQUEL XR   This may have changed:    - Another medication with the same name was added. Make sure you understand how and when to take each.  - Another medication with the same name was removed. Continue taking this medication, and follow the directions you see here.   Used for:  Bipolar I disorder, most recent episode depressed (H)   Changed by:  Renee Vuong MD        Dose:  50 mg   Take 1 tablet (50 mg) by mouth At Bedtime Along with 200 mg tabs   Quantity:  30 each   Refills:  3       * QUEtiapine 300 MG 24 hr tablet   Commonly known as:  SEROquel XR   This may have changed:  You were already taking a medication with the same name, and this prescription was added. Make sure you understand how and when to take each.   Used for:  Bipolar I disorder, most recent episode depressed (H)   Replaces:  SEROQUEL PO   Changed by:  Renee Vuong MD        Dose:  300 mg   Take 1 tablet (300 mg) by mouth At Bedtime   Quantity:  30 tablet   Refills:  3       * Notice:   This list has 3 medication(s) that are the same as other medications prescribed for you. Read the directions carefully, and ask your doctor or other care provider to review them with you.      Stop taking these medicines if you haven't already. Please contact your care team if you have questions.     SEROQUEL PO   Replaced by:  QUEtiapine 300 MG 24 hr tablet   You also have another medication with the same name that you need to continue taking as instructed.   Stopped by:  Renee Vuong MD                Where to get your medicines      These medications were sent to [id number] - [place], [place] - [address]  [address] [facility], [facility] [address]     Phone:  [phone]     QUEtiapine 300 MG 24 hr tablet                Primary Care Provider Office Phone # Fax #    R Wilmer Mendez [phone]       [address] [facility] [address]        Equal Access to Services     [facility]: Hadii aad ku hadasho Soomaali, waaxda luqadaha, qaybta kaalmada adeegyada, oh up hayirina mcleod . So [facility] [phone].    ATENCIÓN: Si habla español, tiene a davis disposición servicios gratuitos de asistencia lingüística. Adamame al [phone].    We comply with applicable federal civil rights laws and [place] laws. We do not discriminate on the basis of race, color, national origin, age, disability, sex, sexual orientation, or gender identity.            Thank you!     Thank you for choosing [facility]  for your care. Our goal is always to provide you with excellent care. Hearing back from our patients is one way we can continue to improve our services. Please take a few minutes to complete the written survey that you may receive in the mail after your visit with us. Thank you!             Your Updated Medication List - Protect others around you: Learn how to safely use, store and throw away your medicines at www.disposemymeds.org.          This list is accurate  as of 7/13/18 10:16 AM.  Always use your most recent med list.                   Brand Name Dispense Instructions for use Diagnosis    albuterol 108 (90 Base) MCG/ACT Inhaler    PROAIR HFA/PROVENTIL HFA/VENTOLIN HFA    1 Inhaler    Inhale 2 puffs into the lungs every 6 hours as needed for shortness of breath / dyspnea or wheezing    Dyspnea, unspecified type       * clonazePAM 0.5 MG ODT tab    klonoPIN    30 tablet    Take 1 tablet (0.5 mg) by mouth daily as needed for anxiety    Bipolar I disorder (H)       * clonazePAM 1 MG tablet    klonoPIN    30 tablet    Take 1 tablet (1 mg) by mouth At Bedtime    Bipolar I disorder (H)       omeprazole 20 MG CR capsule    priLOSEC    30 capsule    Take 1 capsule (20 mg) by mouth daily    Gastroesophageal reflux disease without esophagitis       * QUEtiapine 200 MG 24 hr tablet    SEROQUEL XR    30 tablet    Take 1 tablet (200 mg) by mouth At Bedtime For 5 days then add the 50 mg tab for total of 250 mg    Bipolar I disorder, most recent episode depressed (H)       * QUEtiapine 50 MG Tb24 24 hr tablet    SEROQUEL XR    30 each    Take 1 tablet (50 mg) by mouth At Bedtime Along with 200 mg tabs    Bipolar I disorder, most recent episode depressed (H)       * QUEtiapine 300 MG 24 hr tablet    SEROquel XR    30 tablet    Take 1 tablet (300 mg) by mouth At Bedtime    Bipolar I disorder, most recent episode depressed (H)       * Notice:  This list has 5 medication(s) that are the same as other medications prescribed for you. Read the directions carefully, and ask your doctor or other care provider to review them with you.

## 2018-07-14 ASSESSMENT — PATIENT HEALTH QUESTIONNAIRE - PHQ9: SUM OF ALL RESPONSES TO PHQ QUESTIONS 1-9: 10

## 2018-07-14 ASSESSMENT — ANXIETY QUESTIONNAIRES: GAD7 TOTAL SCORE: 12

## 2018-08-28 ENCOUNTER — OFFICE VISIT (OUTPATIENT)
Dept: PSYCHIATRY | Facility: OTHER | Age: 38
End: 2018-08-28
Attending: PSYCHIATRY & NEUROLOGY
Payer: COMMERCIAL

## 2018-08-28 VITALS
HEART RATE: 88 BPM | WEIGHT: 187 LBS | SYSTOLIC BLOOD PRESSURE: 108 MMHG | DIASTOLIC BLOOD PRESSURE: 68 MMHG | BODY MASS INDEX: 24.84 KG/M2 | TEMPERATURE: 97.7 F

## 2018-08-28 DIAGNOSIS — F43.10 PTSD (POST-TRAUMATIC STRESS DISORDER): Primary | ICD-10-CM

## 2018-08-28 PROCEDURE — G0463 HOSPITAL OUTPT CLINIC VISIT: HCPCS

## 2018-08-28 PROCEDURE — 99214 OFFICE O/P EST MOD 30 MIN: CPT | Performed by: PSYCHIATRY & NEUROLOGY

## 2018-08-28 ASSESSMENT — ANXIETY QUESTIONNAIRES
6. BECOMING EASILY ANNOYED OR IRRITABLE: SEVERAL DAYS
2. NOT BEING ABLE TO STOP OR CONTROL WORRYING: MORE THAN HALF THE DAYS
IF YOU CHECKED OFF ANY PROBLEMS ON THIS QUESTIONNAIRE, HOW DIFFICULT HAVE THESE PROBLEMS MADE IT FOR YOU TO DO YOUR WORK, TAKE CARE OF THINGS AT HOME, OR GET ALONG WITH OTHER PEOPLE: VERY DIFFICULT
3. WORRYING TOO MUCH ABOUT DIFFERENT THINGS: SEVERAL DAYS
5. BEING SO RESTLESS THAT IT IS HARD TO SIT STILL: NOT AT ALL
7. FEELING AFRAID AS IF SOMETHING AWFUL MIGHT HAPPEN: SEVERAL DAYS
1. FEELING NERVOUS, ANXIOUS, OR ON EDGE: SEVERAL DAYS
GAD7 TOTAL SCORE: 8

## 2018-08-28 ASSESSMENT — PATIENT HEALTH QUESTIONNAIRE - PHQ9: 5. POOR APPETITE OR OVEREATING: MORE THAN HALF THE DAYS

## 2018-08-28 ASSESSMENT — PAIN SCALES - GENERAL: PAINLEVEL: NO PAIN (0)

## 2018-08-28 NOTE — NURSING NOTE
"Chief Complaint   Patient presents with     RECHECK     Mental health.       Initial /68 (BP Location: Left arm, Patient Position: Sitting, Cuff Size: Adult Regular)  Pulse 88  Temp 97.7  F (36.5  C) (Tympanic)  Wt 187 lb (84.8 kg)  BMI 24.84 kg/m2 Estimated body mass index is 24.84 kg/(m^2) as calculated from the following:    Height as of 12/18/17: 6' 0.75\" (1.848 m).    Weight as of this encounter: 187 lb (84.8 kg).  Medication Reconciliation: complete    KIERAN GARCIA LPN    "

## 2018-08-28 NOTE — PROGRESS NOTES
"  PSYCHIATRY CLINIC PROGRESS NOTE   20 minute medication management, more than 50% of time spent counseling patient on medications, medication side effects, symptom history and management   SUBJECTIVE / INTERIM HISTORY                                                                        Social- with ISABEL Sandhu and her kids Children- Phoebe's kids are 5, 7 , and 10 yo. Ozzy is the 15 yo Wiener dog  Last visit 7/13/18: -Seroquel  mg HS and I will fill.   continue Klonopin 1 mg HS and 0.5 mg. Otherwise we are opting out Latuda and maybe Saprhis in future...    - is takig 300 mg Seroquel and feels is good for now. Is NOT helping him sleep though. Does feel mood is better.   - appetite is down again   - he and Phoebe working more on relationship / communication  - Klonopin is helping him more with mood , anxiety whereas in past when he took Xanax which \"I'd be sleeping in half hour\"  - has been dx PTSD in past: when was working with Bernabe Browning.....  - aggressive in past while manic.  SUBSTANCE USE- smokes 1/2 PPD since age 17 yo.  past use  Meth: ~2 years clean. , MJ. TX: [place] and [employer]. EtOH now rare.    SYMPTOMS- Decreased appetite, low energy, low mood. Insomnia still.  Passive SI: no intent or plan. Paranoia has improved. No manic sx.  nightmares, flashbacks, detached, angry outbursts, self-destructive, startle response, hypervigilance and fear Sleep now: about 2-3 hours then up for awhile, then another hour. His goal would be 6 continuous.  MEDICAL ROS- loss off appetite still even though we added Seroquel back. SOB: usually at night. But sometimes during day as well not even with exertion.  increased appetite / weight  MEDICAL / SURGICAL HISTORY                   Patient Active Problem List   Diagnosis     Bipolar disorder with psychotic features (H)     Insomnia     History of mental disorder     Attention deficit disorder     Posttraumatic stress disorder     Decreased sex drive     " "Gastroesophageal reflux disease without esophagitis     ALLERGY   Bee venom; Latex; Percocet [oxycodone-acetaminophen]; Toradol; Tramadol; and Tegretol [carbamazepine]  MEDICATIONS                                                                                             Current Outpatient Prescriptions   Medication Sig     albuterol (PROAIR HFA/PROVENTIL HFA/VENTOLIN HFA) 108 (90 BASE) MCG/ACT Inhaler Inhale 2 puffs into the lungs every 6 hours as needed for shortness of breath / dyspnea or wheezing     clonazePAM (KLONOPIN) 0.5 MG ODT tab Take 1 tablet (0.5 mg) by mouth daily as needed for anxiety     clonazePAM (KLONOPIN) 1 MG tablet Take 1 tablet (1 mg) by mouth At Bedtime     omeprazole (PRILOSEC) 20 MG CR capsule Take 1 capsule (20 mg) by mouth daily     QUEtiapine (SEROQUEL XR) 300 MG 24 hr tablet Take 1 tablet (300 mg) by mouth At Bedtime     QUEtiapine (SEROQUEL XR) 50 MG TB24 24 hr tablet Take 1 tablet (50 mg) by mouth At Bedtime Along with 200 mg tabs (Patient not taking: Reported on 8/28/2018)     [DISCONTINUED] QUEtiapine (SEROQUEL XR) 200 MG 24 hr tablet Take 1 tablet (200 mg) by mouth At Bedtime For 5 days then add the 50 mg tab for total of 250 mg (Patient not taking: Reported on 8/28/2018)     No current facility-administered medications for this visit.          PAST MEDICATION TRIALS    Prozac (fluoxetine), Zoloft (sertraline), Paxil (paroxetine) and Cymbalta (duloxetine). Paxil sexual SEs and constipation. Mirtazapine ineffective for insomnia.   Elavil (amitriptyline).   Lithium Carbonate, Depakote and Depakot ER (valproate) and Neurontin. Topamax didn't help (gabapentin). Lithium \"that was like eating pennies and nickels\" but helped / effective. Didn't like the blood level draws with lithium. Doesn't think has been on tegretol or trileptal.   Seroquel (quetiapine)., Abilify   no older antipsychotics.   Xanax (alprazolam). Doesn't think has been on Buspar   Ambien (zolpidem) and Lunesta " "(eszopiclone). Lunesta helped initial insomnia, NOT middle. Ambien made him violent and agitated.       VITALS   /68 (BP Location: Left arm, Patient Position: Sitting, Cuff Size: Adult Regular)  Pulse 88  Temp 97.7  F (36.5  C) (Tympanic)  Wt 187 lb (84.8 kg)  BMI 24.84 kg/m2     PHQ9                     [unfilled]  LABS                                                                                                                           Recent Labs   Lab Test  05/21/18   0856  11/22/17   0914   CHOL  203*  216*   HDL  24*  25*   LDL  142*  132*   TRIG  184*  295*     Lab Results   Component Value Date    TSH 1.27 05/21/2018     Last Basic Metabolic Panel:  Lab Results   Component Value Date     05/21/2018      Lab Results   Component Value Date    POTASSIUM 3.8 05/21/2018     Lab Results   Component Value Date    CHLORIDE 103 05/21/2018     Lab Results   Component Value Date    TOO 9.7 05/21/2018     Lab Results   Component Value Date    CO2 29 05/21/2018     Lab Results   Component Value Date    BUN 8 05/21/2018     Lab Results   Component Value Date    CR 1.09 05/21/2018     Lab Results   Component Value Date    GLC 81 05/21/2018          ms (on Seroquel) March 2017    MENTAL STATUS EXAM                                                                                        Alert. Oriented to person, place, and date / time. Casually groomed, calm, cooperative with good eye contact. No problems with speech. Psychomotor: NOT restless as he was last visit. Mood was depressed but described as \"doing better though\"  and affect was congruent to speech content and full range. Thought process, including associations, was unremarkable and thought content was devoid of suicidal and homicidal ideation and psychotic thought. No hallucinations. Insight was good. Judgment was intact and adequate for safety. Fund of knowledge was intact. Pt demonstrates no obvious problems with attention, " concentration, language, recent or remote memory although these were not formally tested.     ASSESSMENT                                                                                                      HISTORICAL:  Initial psych note 9/14/16        NOTES:  Depakote in past didn't like it. Lithium in past metallic past but did work: doesn't want to go on it again. Trazodone: restless leg. Abilify: restless / akathisia. Tegretol rash  Jani Langford is a 39 yo male with history of bipolar disorder, PTSD and chem dep - on Seroquel total 700 mg and been on for awhile. Main issue is sleep : has not had luck with sleep meds so discussed I will certainly try but given he has not found past meds effective more unlikely we are going to find med that works. We tried Remeron with no luck. We tried Topamax and no luck. He saw Dr. Garcia  and started on Mirapex.We tried Belsomra and didn't help.    Our change of Seroquel to Abilify for Jani went well initially. Then  akathisia really bad. He has been taking Seroquel 150 mg. I was thinking carbamazepine but he had rash in past. Thinking latuda but Jani deosn't want to take that given risk for akathisia (had bad akthisia wth Abilify). Appetite actually been down even on low dose Seroquel: last visit we increased rather than have him try Saphris our next option. He was on the fence as to increase Seroquel or switch but today he notes takng 300 mg and feels doing better in comparison to past couple months. Bit down but I think seems in relation to relationship..       TREATMENT RISK STATEMENT:  The risks, benefits, alternatives and potential adverse effects have been explained and are understood by the pt.  The pt agrees to the treatment plan with the ability to do so.   The pt knows to call the clinic for any problems or access emergency care if needed.        DIAGNOSES                    Bipolar I disorder with history of psychotic features  PTSD       PLAN                                                                                                                     1)  MEDICATIONS:         --Seroquel  mg HS and I will fill.   continue Klonopin 1 mg HS and 0.5 mg. Otherwise we are opting out Latuda and maybe Saprhis in future...    2)  THERAPY:  No Change    3)  LABS:  lipid panel and glucose, LFTs, BMP including fasting glucose were done in May '17 and then in nov '17 as was glucose. EKG 3/2017.  recheck lipid and glucose and tSH toady     4)  PT MONITOR [call for probs]:  Worsening symptoms, SI/HI, SEs from meds    5)  REFERRALS [CD, medical, other]: none    6)  RTC: ~1 month

## 2018-08-28 NOTE — MR AVS SNAPSHOT
After Visit Summary   8/28/2018    Jani Langford    MRN: [id number]           Patient Information     Date Of Birth          1980        Visit Information        Provider Department      8/28/2018 11:40 AM Renee Vuong MD [facility] [place]         Follow-ups after your visit        Your next 10 appointments already scheduled     Sep 28, 2018  9:00 AM CDT   (Arrive by 8:45 AM)   Return Visit with Renee Vuong MD   [facility] [place] ([facility] [place] )    [address] [place] [address]  [place] [address] [phone]              Who to contact     If you have questions or need follow up information about today's clinic visit or your schedule please contact [facility] directly at [phone].  Normal or non-critical lab and imaging results will be communicated to you by MyChart, letter or phone within 4 business days after the clinic has received the results. If you do not hear from us within 7 days, please contact the clinic through MyChart or phone. If you have a critical or abnormal lab result, we will notify you by phone as soon as possible.  Submit refill requests through deeplocal or call your pharmacy and they will forward the refill request to us. Please allow 3 business days for your refill to be completed.          Additional Information About Your Visit        Care EveryWhere ID     This is your Care EveryWhere ID. This could be used by other organizations to access your [place] medical records  [id number]        Your Vitals Were     Pulse Temperature BMI (Body Mass Index)             88 97.7  F (36.5  C) (Tympanic) 24.84 kg/m2          Blood Pressure from Last 3 Encounters:   08/28/18 108/68   07/13/18 110/58   06/22/18 106/64    Weight from Last 3 Encounters:   08/28/18 187 lb (84.8 kg)   07/13/18 181 lb (82.1 kg)   06/22/18 180 lb (81.6 kg)              Today, you had the following     No orders found for display         Today's  Medication Changes          These changes are accurate as of 8/28/18 12:18 PM.  If you have any questions, ask your nurse or doctor.               These medicines have changed or have updated prescriptions.        Dose/Directions    clonazePAM 1 MG tablet   Commonly known as:  klonoPIN   This may have changed:  Another medication with the same name was removed. Continue taking this medication, and follow the directions you see here.   Used for:  Bipolar I disorder (H)   Changed by:  Renee Vuong MD        Dose:  1 mg   Take 1 tablet (1 mg) by mouth At Bedtime   Quantity:  30 tablet   Refills:  3       * QUEtiapine 50 MG Tb24 24 hr tablet   Commonly known as:  SEROQUEL XR   This may have changed:  Another medication with the same name was removed. Continue taking this medication, and follow the directions you see here.   Used for:  Bipolar I disorder, most recent episode depressed (H)   Changed by:  Renee Vuong MD        Dose:  50 mg   Take 1 tablet (50 mg) by mouth At Bedtime Along with 200 mg tabs   Quantity:  30 each   Refills:  3       * QUEtiapine 300 MG 24 hr tablet   Commonly known as:  SEROquel XR   This may have changed:  Another medication with the same name was removed. Continue taking this medication, and follow the directions you see here.   Used for:  Bipolar I disorder, most recent episode depressed (H)   Changed by:  Renee Vuong MD        Dose:  300 mg   Take 1 tablet (300 mg) by mouth At Bedtime   Quantity:  30 tablet   Refills:  3       * Notice:  This list has 2 medication(s) that are the same as other medications prescribed for you. Read the directions carefully, and ask your doctor or other care provider to review them with you.             Primary Care Provider Office Phone # Fax #    R Wilmer Mendez [phone]       [facility] [place] [address]        Equal Access to Services     MIKE SAHU AH: nafisa Hopper qaybta  oh lopezafua mcleod ah. So [facility] [phone].    ATENCIÓN: Si carlton mcgill, tiene a davis disposición servicios gratuitos de asistencia lingüística. Isak al [phone].    We comply with applicable federal civil rights laws and [place] laws. We do not discriminate on the basis of race, color, national origin, age, disability, sex, sexual orientation, or gender identity.            Thank you!     Thank you for choosing [facility]  for your care. Our goal is always to provide you with excellent care. Hearing back from our patients is one way we can continue to improve our services. Please take a few minutes to complete the written survey that you may receive in the mail after your visit with us. Thank you!             Your Updated Medication List - Protect others around you: Learn how to safely use, store and throw away your medicines at www.disposemymeds.org.          This list is accurate as of 8/28/18 12:18 PM.  Always use your most recent med list.                   Brand Name Dispense Instructions for use Diagnosis    albuterol 108 (90 Base) MCG/ACT inhaler    PROAIR HFA/PROVENTIL HFA/VENTOLIN HFA    1 Inhaler    Inhale 2 puffs into the lungs every 6 hours as needed for shortness of breath / dyspnea or wheezing    Dyspnea, unspecified type       clonazePAM 1 MG tablet    klonoPIN    30 tablet    Take 1 tablet (1 mg) by mouth At Bedtime    Bipolar I disorder (H)       omeprazole 20 MG CR capsule    priLOSEC    30 capsule    Take 1 capsule (20 mg) by mouth daily    Gastroesophageal reflux disease without esophagitis       * QUEtiapine 50 MG Tb24 24 hr tablet    SEROQUEL XR    30 each    Take 1 tablet (50 mg) by mouth At Bedtime Along with 200 mg tabs    Bipolar I disorder, most recent episode depressed (H)       * QUEtiapine 300 MG 24 hr tablet    SEROquel XR    30 tablet    Take 1 tablet (300 mg) by mouth At Bedtime    Bipolar I disorder, most recent episode  depressed (H)       * Notice:  This list has 2 medication(s) that are the same as other medications prescribed for you. Read the directions carefully, and ask your doctor or other care provider to review them with you.

## 2018-08-29 ASSESSMENT — PATIENT HEALTH QUESTIONNAIRE - PHQ9: SUM OF ALL RESPONSES TO PHQ QUESTIONS 1-9: 12

## 2018-08-29 ASSESSMENT — ANXIETY QUESTIONNAIRES: GAD7 TOTAL SCORE: 8

## 2018-09-06 DIAGNOSIS — K21.9 GASTROESOPHAGEAL REFLUX DISEASE WITHOUT ESOPHAGITIS: ICD-10-CM

## 2018-09-06 NOTE — TELEPHONE ENCOUNTER
prilosec      Last Written Prescription Date:  3/14/17  Last Fill Quantity: 30,   # refills: 3  Last Office Visit: 12/18/17  Future Office visit:    Next 5 appointments (look out 90 days)     Sep 28, 2018  9:00 AM CDT   (Arrive by 8:45 AM)   Return Visit with Renee Vuong MD   [facility] [place] ([facility] [place] )    [facility] [address] [place] [address]  [place] [address] [phone]

## 2018-09-13 ENCOUNTER — OFFICE VISIT (OUTPATIENT)
Dept: FAMILY MEDICINE | Facility: OTHER | Age: 38
End: 2018-09-13
Attending: FAMILY MEDICINE
Payer: COMMERCIAL

## 2018-09-13 VITALS
TEMPERATURE: 99 F | RESPIRATION RATE: 16 BRPM | HEIGHT: 73 IN | DIASTOLIC BLOOD PRESSURE: 50 MMHG | SYSTOLIC BLOOD PRESSURE: 105 MMHG | WEIGHT: 188 LBS | BODY MASS INDEX: 24.92 KG/M2 | HEART RATE: 111 BPM | OXYGEN SATURATION: 96 %

## 2018-09-13 DIAGNOSIS — Z72.0 TOBACCO ABUSE: ICD-10-CM

## 2018-09-13 DIAGNOSIS — J01.01 ACUTE RECURRENT MAXILLARY SINUSITIS: Primary | ICD-10-CM

## 2018-09-13 DIAGNOSIS — Z71.6 TOBACCO ABUSE COUNSELING: ICD-10-CM

## 2018-09-13 PROCEDURE — 99213 OFFICE O/P EST LOW 20 MIN: CPT | Performed by: FAMILY MEDICINE

## 2018-09-13 PROCEDURE — G0463 HOSPITAL OUTPT CLINIC VISIT: HCPCS

## 2018-09-13 ASSESSMENT — ANXIETY QUESTIONNAIRES
GAD7 TOTAL SCORE: 14
7. FEELING AFRAID AS IF SOMETHING AWFUL MIGHT HAPPEN: SEVERAL DAYS
1. FEELING NERVOUS, ANXIOUS, OR ON EDGE: MORE THAN HALF THE DAYS
6. BECOMING EASILY ANNOYED OR IRRITABLE: MORE THAN HALF THE DAYS
3. WORRYING TOO MUCH ABOUT DIFFERENT THINGS: NEARLY EVERY DAY
IF YOU CHECKED OFF ANY PROBLEMS ON THIS QUESTIONNAIRE, HOW DIFFICULT HAVE THESE PROBLEMS MADE IT FOR YOU TO DO YOUR WORK, TAKE CARE OF THINGS AT HOME, OR GET ALONG WITH OTHER PEOPLE: VERY DIFFICULT
5. BEING SO RESTLESS THAT IT IS HARD TO SIT STILL: NOT AT ALL
2. NOT BEING ABLE TO STOP OR CONTROL WORRYING: NEARLY EVERY DAY

## 2018-09-13 ASSESSMENT — PAIN SCALES - GENERAL: PAINLEVEL: MILD PAIN (3)

## 2018-09-13 ASSESSMENT — PATIENT HEALTH QUESTIONNAIRE - PHQ9: 5. POOR APPETITE OR OVEREATING: NEARLY EVERY DAY

## 2018-09-13 NOTE — PROGRESS NOTES
"  SUBJECTIVE:   Jani Langford is a 38 year old male who presents to clinic today for the following health issues:      RESPIRATORY SYMPTOMS      Duration: 2-3 days    Description  nasal congestion, sore throat, cough, difficulty swallowing and chest congestion    Severity:  3/10 throat    Accompanying signs and symptoms: SOB    History (predisposing factors):  Children ill, Fiance has Bronchitis    Precipitating or alleviating factors: None    Therapies tried and outcome:  acetaminophen pm, inhaler, cold medicine      PROBLEMS TO ADD ON...    Problem list and histories reviewed & adjusted, as indicated.  Additional history: getting purulent sputum and rhinorrhea.  \"neon green\" at times.  No blood.   Smoker.     Patient Active Problem List   Diagnosis     Bipolar disorder with psychotic features (H)     Insomnia     History of mental disorder     Attention deficit disorder     Posttraumatic stress disorder     Decreased sex drive     Gastroesophageal reflux disease without esophagitis     Past Surgical History:   Procedure Laterality Date     DENTAL SURGERY      pulled 2 teeth month ago     ENT SURGERY      sinus       Social History   Substance Use Topics     Smoking status: Current Every Day Smoker     Packs/day: 0.75     Types: Cigarettes     Start date: 1/1/1995     Smokeless tobacco: Never Used      Comment: trying to quit     Alcohol use Yes      Comment: rare     Family History   Problem Relation Age of Onset     Mental Illness Mother      Depression Mother          Current Outpatient Prescriptions   Medication Sig Dispense Refill     amoxicillin-clavulanate (AUGMENTIN) 875-125 MG per tablet Take 1 tablet by mouth 2 times daily 20 tablet 0     ClonazePAM (KLONOPIN PO) Take 0.5 mg by mouth as needed for anxiety       albuterol (PROAIR HFA/PROVENTIL HFA/VENTOLIN HFA) 108 (90 BASE) MCG/ACT Inhaler Inhale 2 puffs into the lungs every 6 hours as needed for shortness of breath / dyspnea or wheezing 1 Inhaler 1     " "clonazePAM (KLONOPIN) 1 MG tablet Take 1 tablet (1 mg) by mouth At Bedtime 30 tablet 3     omeprazole (PRILOSEC) 20 MG CR capsule TAKE 1 CAPSULE (20 MG) BY MOUTH DAILY 30 capsule 1     QUEtiapine (SEROQUEL XR) 300 MG 24 hr tablet Take 1 tablet (300 mg) by mouth At Bedtime 30 tablet 3     [DISCONTINUED] QUEtiapine (SEROQUEL XR) 50 MG TB24 24 hr tablet Take 1 tablet (50 mg) by mouth At Bedtime Along with 200 mg tabs (Patient not taking: Reported on 8/28/2018) 30 each 3     Allergies   Allergen Reactions     Abilify [Aripiprazole]      Side effects     Bee Venom      Latex Hives     Percocet [Oxycodone-Acetaminophen]      Short of breath vomiting     Toradol      seizure     Tramadol      Short of breath and vomiting     Tegretol [Carbamazepine] Rash       Reviewed and updated as needed this visit by clinical staff  Allergies  Meds       Reviewed and updated as needed this visit by Provider         ROS:  Constitutional, HEENT, cardiovascular, pulmonary, gi and gu systems are negative, except as otherwise noted.    OBJECTIVE:                                                    /50  Pulse 111  Temp 99  F (37.2  C) (Tympanic)  Resp 16  Ht 6' 1\" (1.854 m)  Wt 188 lb (85.3 kg)  SpO2 96%  BMI 24.8 kg/m2  Body mass index is 24.8 kg/(m^2).  GENERAL APPEARANCE: Alert, no acute distress  HEENT; normal.  Minimal erythema in the throat and ears but no infections.   CV: regular rate and rhythm, no murmur, rub or gallop  RESP: lungs clear to auscultation bilaterally  ABDOMEN: normal bowel sounds, soft, nontender, no hepatosplenomegaly or other masses  SKIN: no suspicious lesions or rashes to visualized skin  NEURO: Alert, oriented x 3, speech and mentation normal           ASSESSMENT/PLAN:                                                    1. Tobacco abuse  Recommend cessation.   - Tobacco Cessation - Order to Satisfy Health Maintenance    2. Tobacco abuse counseling  As aboev.     3. Acute recurrent maxillary " sinusitis  Discussed.  Viral right now.  augmentin to have and hold.  Give it a week.  If ongoing, can use.  Discussed indications for use and I told him I don't think he will need it.   - amoxicillin-clavulanate (AUGMENTIN) 875-125 MG per tablet; Take 1 tablet by mouth 2 times daily  Dispense: 20 tablet; Refill: 0          Selwyn Lopez MD  [facility]

## 2018-09-13 NOTE — MR AVS SNAPSHOT
After Visit Summary   9/13/2018    Jani Langford    MRN: [id number]           Patient Information     Date Of Birth          1980        Visit Information        Provider Department      9/13/2018 9:30 AM Selwyn Lopez MD [facility]        Today's Diagnoses     Acute recurrent maxillary sinusitis    -  1    Tobacco abuse        Tobacco abuse counseling          Care Instructions      HOW TO QUIT SMOKING  Smoking is one of the hardest habits to break. About half of all those who have ever smoked have been able to quit, and most of those (about 70%) who still smoke want to quit. Here are some of the best ways to stop smoking.     KEEP TRYING:  It takes most smokers about 8 tries before they are finally able to fully quit. So, the more often you try and fail, the better your chance of quitting the next time! So, don't give up!    GO COLD TURKEY:  Most ex-smokers quit cold turkey. Trying to cut back gradually doesn't seem to work as well, perhaps because it continues the smoking habit. Also, it is possible to fool yourself by inhaling more while smoking fewer cigarettes. This results in the same amount of nicotine in your body!    GET SUPPORT:  Support programs can make an important difference, especially for the heavy smoker. These groups offer lectures, methods to change your behavior and peer support. Call the free national Quitline for more information. 800-QUIT-NOW (699-730-1152). Low-cost or free programs are offered by many hospitals, local chapters of the American Lung Association (256-178-8557) and the American Cancer Society (157-752-2800). Support at home is important too. Non-smokers can help by offering praise and encouragement. If the smoker fails to quit, encourage them to try again!    OVER-THE-COUNTER MEDICINES:  For those who can't quit on their own, Nicotine Replacement Therapy (NRT) may make quitting much easier. Certain aids such as the nicotine patch, gum and  lozenge are available without a prescription. However, it is best to use these under the guidance of your doctor. The skin patch provides a steady supply of nicotine to the body. Nicotine gum and lozenge gives temporary bursts of low levels of nicotine. Both methods take the edge off the craving for cigarettes. WARNING: If you feel symptoms of nicotine overdose, such as nausea, vomiting, dizziness, weakness, or fast heartbeat, stop using these and see your doctor.    PRESCRIPTION MEDICINES:  After evaluating your smoking patterns and prior attempts at quitting, your doctor may offer a prescription medicine such as bupropion (Zyban, Wellbutrin), varenicline (Chantix, Champix), a niocotine inhaler or nasal spray. Each has its unique advantage and side effects which your doctor can review with you.    HEALTH BENEFITS OF QUITTING:  The benefits of quitting start right away and keep improving the longer you go without smokin minutes: blood pressure and pulse return to normal  8 hours: oxygen levels return to normal  2 days: ability to smell and taste begins to improve as damaged nerves start to regrow  2-3 weeks: circulation and lung function improves  1-9 months: decreased cough, congestion and shortness of breath; less tired  1 year: risk of heart attack decreases by half  5 years: risk of lung cancer decreases by half; risk of stroke becomes the same as a non-smoker  For information about how to quit smoking, visit the following links:  National Cancer [place] ,   Clearing the Air, Quit Smoking Today   - an online booklet. http://www.smokefree.gov/pubs/clearing_the_air.pdf  Smokefree.gov http://smokefree.gov/  QuitNet http://www.quitnet.com/    5929-5204 Natalia Blood, [address] [place] [address], [place] [address]. All rights reserved. This information is not intended as a substitute for professional medical care. Always follow your healthcare professional's instructions.    The Benefits of Living Smoke  Free  What do you want to gain from quitting? Check off some reasons to quit.  Health Benefits  ___ Reduce my risk of lung cancer, heart disease, chronic lung disease  ___ Have fewer wrinkles and softer skin  ___ Improve my sense of taste and smell  ___ For pregnant women--reduce the risk of having a miscarriage, stillbirth, premature birth, or low-birth-weight baby  Personal Benefits  ___ Feel more in control of my life  ___ Have better-smelling hair, breath, clothes, home, and car  ___ Save time by not having to take smoke breaks, buy cigarettes, or hunt for a light  ___ Have whiter teeth  Family Benefits  ___ Reduce my children s respiratory tract infections  ___ Set a good example for my children  ___ Reduce my family s cancer risk  Financial Benefits  ___ Save hundreds of dollars each year that would be spent on cigarettes  ___ Save money on medical bills  ___ Save on life, health, and car insurance premiums    Those Dollars Add Up!  Cigarettes are expensive, and getting more expensive all the time. Do you realize how much money you are spending on cigarettes per year? What is the average amount you spend on a pack of cigarettes? What is the average number of packs that you smoke per day? Using your answers to these questions, fill in this formula to help you find out:  ($ _____ per pack) ×  ( _____ number of packs per day) × (365 days) =  $ _____ yearly cost of smoking  Besides tobacco, there are other costs, including extra cleaning bills and replacement costs for clothing and furniture; medical expenses for smoking-related illnesses; and higher health, life, and car insurance premiums.    Cigars and Pipes Count Too!  Cigars and pipes are also dangerous. So are smokeless (chewing) tobacco and snuff. All of these products contain nicotine, a highly addictive substance that has harmful effects on your body. Quitting smoking means giving up all tobacco products.      9606-0312 Natalia Blood, [address] [place] [address]  "Road, [place] [address]. All rights reserved. This information is not intended as a substitute for professional medical care. Always follow your healthcare professional's instructions.          Follow-ups after your visit        Your next 10 appointments already scheduled     Sep 28, 2018  9:00 AM CDT   (Arrive by 8:45 AM)   Return Visit with Renee Vuong MD   [facility] [place] ([facility] [place] )    [address]  [place] [address] [phone]              Who to contact     If you have questions or need follow up information about today's clinic visit or your schedule please contact [facility] directly at [phone].  Normal or non-critical lab and imaging results will be communicated to you by MyChart, letter or phone within 4 business days after the clinic has received the results. If you do not hear from us within 7 days, please contact the clinic through MyChart or phone. If you have a critical or abnormal lab result, we will notify you by phone as soon as possible.  Submit refill requests through [employer] or call your pharmacy and they will forward the refill request to us. Please allow 3 business days for your refill to be completed.          Additional Information About Your Visit        Care EveryWhere ID     This is your Care EveryWhere ID. This could be used by other organizations to access your [facility] medical records  [id number]        Your Vitals Were     Pulse Temperature Respirations Height Pulse Oximetry BMI (Body Mass Index)    111 99  F (37.2  C) (Tympanic) 16 6' 1\" (1.854 m) 96% 24.8 kg/m2       Blood Pressure from Last 3 Encounters:   09/13/18 105/50   08/28/18 108/68   07/13/18 110/58    Weight from Last 3 Encounters:   09/13/18 188 lb (85.3 kg)   08/28/18 187 lb (84.8 kg)   07/13/18 181 lb (82.1 kg)              We Performed the Following     Tobacco Cessation - Order to Satisfy Health Maintenance          Today's Medication Changes    "       These changes are accurate as of 9/13/18 10:34 AM.  If you have any questions, ask your nurse or doctor.               Start taking these medicines.        Dose/Directions    amoxicillin-clavulanate 875-125 MG per tablet   Commonly known as:  AUGMENTIN   Used for:  Acute recurrent maxillary sinusitis   Started by:  Selwyn Lopez MD        Dose:  1 tablet   Take 1 tablet by mouth 2 times daily   Quantity:  20 tablet   Refills:  0            Where to get your medicines      These medications were sent to [id number] - [place], [place] - [address]  [address] [facility] [address]     Phone:  [phone]     amoxicillin-clavulanate 875-125 MG per tablet                Primary Care Provider Office Phone # Fax #    Selwyn Lopez [phone]       [address] [place] [address] E  [facility] [address]        Equal Access to Services     [facility]: Hadii aad ku hadasho Soomaali, waaxda luqadaha, qaybta kaalmada adeegyada, waxay idiin hayaan dulce mcleod . So [facility] [phone].    ATENCIÓN: Si habla español, tiene a davis disposición servicios gratuitos de asistencia lingüística. Llame al [phone].    We comply with applicable federal civil rights laws and [place] laws. We do not discriminate on the basis of race, color, national origin, age, disability, sex, sexual orientation, or gender identity.            Thank you!     Thank you for choosing [facility]  for your care. Our goal is always to provide you with excellent care. Hearing back from our patients is one way we can continue to improve our services. Please take a few minutes to complete the written survey that you may receive in the mail after your visit with us. Thank you!             Your Updated Medication List - Protect others around you: Learn how to safely use, store and throw away your medicines at www.disposemymeds.org.          This list is accurate as of 9/13/18 10:34 AM.  Always use your  most recent med list.                   Brand Name Dispense Instructions for use Diagnosis    albuterol 108 (90 Base) MCG/ACT inhaler    PROAIR HFA/PROVENTIL HFA/VENTOLIN HFA    1 Inhaler    Inhale 2 puffs into the lungs every 6 hours as needed for shortness of breath / dyspnea or wheezing    Dyspnea, unspecified type       amoxicillin-clavulanate 875-125 MG per tablet    AUGMENTIN    20 tablet    Take 1 tablet by mouth 2 times daily    Acute recurrent maxillary sinusitis       * KLONOPIN PO      Take 0.5 mg by mouth as needed for anxiety        * clonazePAM 1 MG tablet    klonoPIN    30 tablet    Take 1 tablet (1 mg) by mouth At Bedtime    Bipolar I disorder (H)       omeprazole 20 MG CR capsule    priLOSEC    30 capsule    TAKE 1 CAPSULE (20 MG) BY MOUTH DAILY    Gastroesophageal reflux disease without esophagitis       QUEtiapine 300 MG 24 hr tablet    SEROquel XR    30 tablet    Take 1 tablet (300 mg) by mouth At Bedtime    Bipolar I disorder, most recent episode depressed (H)       * Notice:  This list has 2 medication(s) that are the same as other medications prescribed for you. Read the directions carefully, and ask your doctor or other care provider to review them with you.

## 2018-09-13 NOTE — NURSING NOTE
"Chief Complaint   Patient presents with     URI       Initial /50  Pulse 111  Temp 99  F (37.2  C) (Tympanic)  Resp 16  Ht 6' 1\" (1.854 m)  Wt 188 lb (85.3 kg)  SpO2 96%  BMI 24.8 kg/m2 Estimated body mass index is 24.8 kg/(m^2) as calculated from the following:    Height as of this encounter: 6' 1\" (1.854 m).    Weight as of this encounter: 188 lb (85.3 kg).  Medication Reconciliation: complete    Annette Figueroa LPN    "

## 2018-09-13 NOTE — PATIENT INSTRUCTIONS

## 2018-09-14 ASSESSMENT — PATIENT HEALTH QUESTIONNAIRE - PHQ9: SUM OF ALL RESPONSES TO PHQ QUESTIONS 1-9: 15

## 2018-09-14 ASSESSMENT — ANXIETY QUESTIONNAIRES: GAD7 TOTAL SCORE: 14

## 2018-09-28 ENCOUNTER — OFFICE VISIT (OUTPATIENT)
Dept: PSYCHIATRY | Facility: OTHER | Age: 38
End: 2018-09-28
Attending: PSYCHIATRY & NEUROLOGY
Payer: COMMERCIAL

## 2018-09-28 VITALS
SYSTOLIC BLOOD PRESSURE: 118 MMHG | DIASTOLIC BLOOD PRESSURE: 66 MMHG | TEMPERATURE: 96.8 F | WEIGHT: 188 LBS | BODY MASS INDEX: 24.8 KG/M2 | HEART RATE: 83 BPM

## 2018-09-28 DIAGNOSIS — F31.30 BIPOLAR I DISORDER, MOST RECENT EPISODE DEPRESSED (H): Primary | ICD-10-CM

## 2018-09-28 PROCEDURE — 99213 OFFICE O/P EST LOW 20 MIN: CPT | Performed by: PSYCHIATRY & NEUROLOGY

## 2018-09-28 PROCEDURE — G0463 HOSPITAL OUTPT CLINIC VISIT: HCPCS

## 2018-09-28 RX ORDER — QUETIAPINE FUMARATE 50 MG/1
50 TABLET, EXTENDED RELEASE ORAL AT BEDTIME
Qty: 30 EACH | Refills: 5 | Status: SHIPPED | OUTPATIENT
Start: 2018-09-28 | End: 2018-12-07 | Stop reason: DRUGHIGH

## 2018-09-28 ASSESSMENT — ANXIETY QUESTIONNAIRES
5. BEING SO RESTLESS THAT IT IS HARD TO SIT STILL: SEVERAL DAYS
IF YOU CHECKED OFF ANY PROBLEMS ON THIS QUESTIONNAIRE, HOW DIFFICULT HAVE THESE PROBLEMS MADE IT FOR YOU TO DO YOUR WORK, TAKE CARE OF THINGS AT HOME, OR GET ALONG WITH OTHER PEOPLE: VERY DIFFICULT
3. WORRYING TOO MUCH ABOUT DIFFERENT THINGS: NEARLY EVERY DAY
2. NOT BEING ABLE TO STOP OR CONTROL WORRYING: NEARLY EVERY DAY
7. FEELING AFRAID AS IF SOMETHING AWFUL MIGHT HAPPEN: MORE THAN HALF THE DAYS
GAD7 TOTAL SCORE: 17
6. BECOMING EASILY ANNOYED OR IRRITABLE: MORE THAN HALF THE DAYS
1. FEELING NERVOUS, ANXIOUS, OR ON EDGE: NEARLY EVERY DAY

## 2018-09-28 ASSESSMENT — PATIENT HEALTH QUESTIONNAIRE - PHQ9: 5. POOR APPETITE OR OVEREATING: NEARLY EVERY DAY

## 2018-09-28 ASSESSMENT — PAIN SCALES - GENERAL: PAINLEVEL: NO PAIN (0)

## 2018-09-28 NOTE — PROGRESS NOTES
"  PSYCHIATRY CLINIC PROGRESS NOTE   20 minute medication management, more than 50% of time spent counseling patient on medications, medication side effects, symptom history and management   SUBJECTIVE / INTERIM HISTORY                                                                        Social- with GF Phoebe and her kids Children- Phoebe's kids are 5, 7 , and 10 yo. Ozzy is the 15 yo Wiener dog  Last visit 8/28/18: -Seroquel  mg HS and I will fill.   continue Klonopin 1 mg HS and 0.5 mg. Otherwise we are opting out Latuda and maybe Saprhis in future...    - is takig 300 mg Seroquel and feels is good for now. Is NOT helping him sleep though. Does feel mood is better.   - appetite down still  - sleep has been \"shit\"  - he and Phoebe working more on relationship / communication  - Klonopin is helping him more with mood , anxiety whereas in past when he took Xanax which \"I'd be sleeping in half hour\"  - has been dx PTSD in past: when was working with Bernabe Browning.....  - aggressive in past while manic.  SUBSTANCE USE- smokes 1/2 PPD since age 17 yo.  past use  Meth: ~2 years clean. , MJ. [place]: [place] and [employer]. EtOH now rare.    SYMPTOMS- Decreased appetite, low energy, low mood. Insomnia still.  Passive SI: no intent or plan. Paranoia has improved. No manic sx.  nightmares, flashbacks, detached, angry outbursts, self-destructive, startle response, hypervigilance and fear Sleep now: about 2-3 hours then up for awhile, then another hour. His goal would be 6 continuous.  MEDICAL ROS- loss off appetite still even though we added Seroquel back. SOB: usually at night. But sometimes during day as well not even with exertion.  increased appetite / weight  MEDICAL / SURGICAL HISTORY                   Patient Active Problem List   Diagnosis     Bipolar disorder with psychotic features (H)     Insomnia     History of mental disorder     Attention deficit disorder     Posttraumatic stress disorder     Decreased sex " "drive     Gastroesophageal reflux disease without esophagitis     ALLERGY   Abilify [aripiprazole]; Bee venom; Latex; Percocet [oxycodone-acetaminophen]; Toradol; Tramadol; and Tegretol [carbamazepine]  MEDICATIONS                                                                                             Current Outpatient Prescriptions   Medication Sig     albuterol (PROAIR HFA/PROVENTIL HFA/VENTOLIN HFA) 108 (90 BASE) MCG/ACT Inhaler Inhale 2 puffs into the lungs every 6 hours as needed for shortness of breath / dyspnea or wheezing     amoxicillin-clavulanate (AUGMENTIN) 875-125 MG per tablet Take 1 tablet by mouth 2 times daily     ClonazePAM (KLONOPIN PO) Take 0.5 mg by mouth as needed for anxiety     clonazePAM (KLONOPIN) 1 MG tablet Take 1 tablet (1 mg) by mouth At Bedtime     omeprazole (PRILOSEC) 20 MG CR capsule TAKE 1 CAPSULE (20 MG) BY MOUTH DAILY     QUEtiapine (SEROQUEL XR) 300 MG 24 hr tablet Take 1 tablet (300 mg) by mouth At Bedtime     No current facility-administered medications for this visit.          PAST MEDICATION TRIALS    Prozac (fluoxetine), Zoloft (sertraline), Paxil (paroxetine) and Cymbalta (duloxetine). Paxil sexual SEs and constipation. Mirtazapine ineffective for insomnia.   Elavil (amitriptyline).   Lithium Carbonate, Depakote and Depakot ER (valproate) and Neurontin. Topamax didn't help (gabapentin). Lithium \"that was like eating pennies and nickels\" but helped / effective. Didn't like the blood level draws with lithium. Doesn't think has been on tegretol or trileptal.   Seroquel (quetiapine)., Abilify   no older antipsychotics.   Xanax (alprazolam). Doesn't think has been on Buspar   Ambien (zolpidem) and Lunesta (eszopiclone). Lunesta helped initial insomnia, NOT middle. Ambien made him violent and agitated.       VITALS   /66 (BP Location: Right arm, Patient Position: Sitting, Cuff Size: Adult Regular)  Pulse 83  Temp 96.8  F (36  C) (Tympanic)  Wt 188 lb (85.3 kg)  " BMI 24.8 kg/m2     PHQ9                     [unfilled]  LABS                                                                                                                           Recent Labs   Lab Test  05/21/18   0856  11/22/17   0914   CHOL  203*  216*   HDL  24*  25*   LDL  142*  132*   TRIG  184*  295*     Lab Results   Component Value Date    TSH 1.27 05/21/2018     Last Basic Metabolic Panel:  Lab Results   Component Value Date     05/21/2018      Lab Results   Component Value Date    POTASSIUM 3.8 05/21/2018     Lab Results   Component Value Date    CHLORIDE 103 05/21/2018     Lab Results   Component Value Date    TOO 9.7 05/21/2018     Lab Results   Component Value Date    CO2 29 05/21/2018     Lab Results   Component Value Date    BUN 8 05/21/2018     Lab Results   Component Value Date    CR 1.09 05/21/2018     Lab Results   Component Value Date    GLC 81 05/21/2018          ms (on Seroquel) March 2017    MENTAL STATUS EXAM                                                                                        Alert. Oriented to person, place, and date / time. Casually groomed, calm, cooperative with good eye contact. No problems with speech. Psychomotor: NOT restless as he was last visit. Mood was irritable and affect was congruent to speech content and full range. Thought process, including associations, was unremarkable and thought content was devoid of suicidal and homicidal ideation and psychotic thought. No hallucinations. Insight was good. Judgment was intact and adequate for safety. Fund of knowledge was intact. Pt demonstrates no obvious problems with attention, concentration, language, recent or remote memory although these were not formally tested.     ASSESSMENT                                                                                                      HISTORICAL:  Initial psych note 9/14/16        NOTES:  Depakote in past didn't like it. Lithium in past metallic  past but did work: doesn't want to go on it again. Trazodone: restless leg. Abilify: restless / akathisia. Tegretol rash  Jani Langford is a 39 yo male with history of bipolar disorder, PTSD and chem dep - on Seroquel total 700 mg and been on for awhile. Main issue is sleep : has not had luck with sleep meds so discussed I will certainly try but given he has not found past meds effective more unlikely we are going to find med that works. We tried Remeron with no luck. We tried Topamax and no luck. He saw Dr. Garcia  and started on Mirapex.We tried Belsomra and didn't help.    Our change of Seroquel to Abilify for Jani went well initially. Then  akathisia really bad. He has been taking Seroquel 300 mg. I was thinking carbamazepine but he had rash in past. Thinking latuda but Jani deosn't want to take that given risk for akathisia (had bad akthisia wth Abilify). Appetite actually been down even on low dose Seroquel: last visit we increased rather than have him try Saphris our next option. He was on the fence as to increase Seroquel or try adding Paxil in but we ended up agreeing on increase from 300 mg to 350 mg.        TREATMENT RISK STATEMENT:  The risks, benefits, alternatives and potential adverse effects have been explained and are understood by the pt.  The pt agrees to the treatment plan with the ability to do so.   The pt knows to call the clinic for any problems or access emergency care if needed.        DIAGNOSES                    Bipolar I disorder with history of psychotic features  PTSD       PLAN                                                                                                                    1)  MEDICATIONS:         --Increase Seroquel  mg HS to 350 mg.    continue Klonopin 1 mg HS and 0.5 mg. Otherwise we are opting out Latuda and maybe Saprhis in future...    2)  THERAPY:  No Change    3)  LABS:  lipid panel and glucose, LFTs, BMP including fasting glucose were done in May  '17 and then in nov '17 as was glucose. EKG 3/2017.      4)  PT MONITOR [call for probs]:  Worsening symptoms, SI/HI, SEs from meds    5)  REFERRALS [CD, medical, other]: none    6)  RTC: ~6 weeks

## 2018-09-28 NOTE — MR AVS SNAPSHOT
After Visit Summary   9/28/2018    Jani Langford    MRN: [id number]           Patient Information     Date Of Birth          1980        Visit Information        Provider Department      9/28/2018 9:00 AM Renee Vuong MD [facility] [place] [facility]        Today's Diagnoses     Bipolar I disorder, most recent episode depressed (H)    -  1       Follow-ups after your visit        Your next 10 appointments already scheduled     Nov 09, 2018  8:20 AM CST   (Arrive by 8:05 AM)   Return Visit with Renee Vuong MD   [facility] [place] [facility] ([facility] )    [address] [place] [address] [id number] [phone]              Who to contact     If you have questions or need follow up information about today's clinic visit or your schedule please contact [facility] directly at [phone].  Normal or non-critical lab and imaging results will be communicated to you by MyChart, letter or phone within 4 business days after the clinic has received the results. If you do not hear from us within 7 days, please contact the clinic through MyChart or phone. If you have a critical or abnormal lab result, we will notify you by phone as soon as possible.  Submit refill requests through [employer] or call your pharmacy and they will forward the refill request to us. Please allow 3 business days for your refill to be completed.          Additional Information About Your Visit        Care EveryWhere ID     This is your Care EveryWhere ID. This could be used by other organizations to access your [place] medical records  [id number]        Your Vitals Were     Pulse Temperature BMI (Body Mass Index)             83 96.8  F (36  C) (Tympanic) 24.8 kg/m2          Blood Pressure from Last 3 Encounters:   09/28/18 118/66   09/13/18 105/50   08/28/18 108/68    Weight from Last 3 Encounters:   09/28/18 188 lb (85.3 kg)   09/13/18 188 lb (85.3 kg)   08/28/18  187 lb (84.8 kg)              Today, you had the following     No orders found for display         Today's Medication Changes          These changes are accurate as of 9/28/18  9:38 AM.  If you have any questions, ask your nurse or doctor.               These medicines have changed or have updated prescriptions.        Dose/Directions    * QUEtiapine 300 MG 24 hr tablet   Commonly known as:  SEROquel XR   This may have changed:  Another medication with the same name was added. Make sure you understand how and when to take each.   Used for:  Bipolar I disorder, most recent episode depressed (H)   Changed by:  Renee Vuong MD        Dose:  300 mg   Take 1 tablet (300 mg) by mouth At Bedtime   Quantity:  30 tablet   Refills:  3       * QUEtiapine 50 MG Tb24 24 hr tablet   Commonly known as:  SEROquel XR   This may have changed:  You were already taking a medication with the same name, and this prescription was added. Make sure you understand how and when to take each.   Used for:  Bipolar I disorder, most recent episode depressed (H)   Changed by:  Renee Vuong MD        Dose:  50 mg   Take 1 tablet (50 mg) by mouth At Bedtime   Quantity:  30 each   Refills:  5       * Notice:  This list has 2 medication(s) that are the same as other medications prescribed for you. Read the directions carefully, and ask your doctor or other care provider to review them with you.         Where to get your medicines      These medications were sent to [id number] - [place], [place] - [address] [address] [address] [facility] [address]     Phone:  [phone]     QUEtiapine 50 MG Tb24 24 hr tablet                Primary Care Provider Office Phone # Fax #    Selwyn Lopez [phone]       [address] [place] [address] E  [facility] [address]        Equal Access to Services     MIKE SAHU AH: Kamilah Bourgeois, nafisa templeton, qayboh galeana. So  [facility] [phone].    ATENCIÓN: Si carlton mcgill, tiene a davis disposición servicios gratuitos de asistencia lingüística. Isak bryant [phone].    We comply with applicable federal civil rights laws and [place] laws. We do not discriminate on the basis of race, color, national origin, age, disability, sex, sexual orientation, or gender identity.            Thank you!     Thank you for choosing [facility]  for your care. Our goal is always to provide you with excellent care. Hearing back from our patients is one way we can continue to improve our services. Please take a few minutes to complete the written survey that you may receive in the mail after your visit with us. Thank you!             Your Updated Medication List - Protect others around you: Learn how to safely use, store and throw away your medicines at www.disposemymeds.org.          This list is accurate as of 9/28/18  9:38 AM.  Always use your most recent med list.                   Brand Name Dispense Instructions for use Diagnosis    albuterol 108 (90 Base) MCG/ACT inhaler    PROAIR HFA/PROVENTIL HFA/VENTOLIN HFA    1 Inhaler    Inhale 2 puffs into the lungs every 6 hours as needed for shortness of breath / dyspnea or wheezing    Dyspnea, unspecified type       amoxicillin-clavulanate 875-125 MG per tablet    AUGMENTIN    20 tablet    Take 1 tablet by mouth 2 times daily    Acute recurrent maxillary sinusitis       * KLONOPIN PO      Take 0.5 mg by mouth as needed for anxiety        * clonazePAM 1 MG tablet    klonoPIN    30 tablet    Take 1 tablet (1 mg) by mouth At Bedtime    Bipolar I disorder (H)       omeprazole 20 MG CR capsule    priLOSEC    30 capsule    TAKE 1 CAPSULE (20 MG) BY MOUTH DAILY    Gastroesophageal reflux disease without esophagitis       * QUEtiapine 300 MG 24 hr tablet    SEROquel XR    30 tablet    Take 1 tablet (300 mg) by mouth At Bedtime    Bipolar I disorder, most recent episode depressed (H)       *  QUEtiapine 50 MG Tb24 24 hr tablet    SEROquel XR    30 each    Take 1 tablet (50 mg) by mouth At Bedtime    Bipolar I disorder, most recent episode depressed (H)       * Notice:  This list has 4 medication(s) that are the same as other medications prescribed for you. Read the directions carefully, and ask your doctor or other care provider to review them with you.

## 2018-09-28 NOTE — NURSING NOTE
"Chief Complaint   Patient presents with     RECHECK     mental health.  Discuss possibility of increasing Seroquel.  Patient c/o depression and sleeping issues.       Initial /66 (BP Location: Right arm, Patient Position: Sitting, Cuff Size: Adult Regular)  Pulse 83  Temp 96.8  F (36  C) (Tympanic)  Wt 188 lb (85.3 kg)  BMI 24.8 kg/m2 Estimated body mass index is 24.8 kg/(m^2) as calculated from the following:    Height as of 9/13/18: 6' 1\" (1.854 m).    Weight as of this encounter: 188 lb (85.3 kg).  Medication Reconciliation: complete    KIERAN GARCIA LPN    "

## 2018-09-29 ASSESSMENT — ANXIETY QUESTIONNAIRES: GAD7 TOTAL SCORE: 17

## 2018-09-29 ASSESSMENT — PATIENT HEALTH QUESTIONNAIRE - PHQ9: SUM OF ALL RESPONSES TO PHQ QUESTIONS 1-9: 17

## 2018-10-20 ENCOUNTER — HOSPITAL ENCOUNTER (EMERGENCY)
Facility: HOSPITAL | Age: 38
Discharge: HOME | End: 2018-10-20
Attending: PHYSICIAN ASSISTANT | Admitting: PHYSICIAN ASSISTANT
Payer: COMMERCIAL

## 2018-10-20 VITALS
DIASTOLIC BLOOD PRESSURE: 67 MMHG | RESPIRATION RATE: 16 BRPM | OXYGEN SATURATION: 96 % | TEMPERATURE: 97.2 F | SYSTOLIC BLOOD PRESSURE: 118 MMHG

## 2018-10-20 DIAGNOSIS — Z23 NEED FOR TETANUS, DIPHTHERIA, AND ACELLULAR PERTUSSIS (TDAP) VACCINE IN PATIENT OF ADOLESCENT AGE OR OLDER: ICD-10-CM

## 2018-10-20 DIAGNOSIS — S31.801A LACERATION OF BUTTOCK, UNSPECIFIED LATERALITY, INITIAL ENCOUNTER: ICD-10-CM

## 2018-10-20 PROCEDURE — 25000128 H RX IP 250 OP 636: Performed by: PHYSICIAN ASSISTANT

## 2018-10-20 PROCEDURE — 99213 OFFICE O/P EST LOW 20 MIN: CPT | Performed by: PHYSICIAN ASSISTANT

## 2018-10-20 PROCEDURE — 90715 TDAP VACCINE 7 YRS/> IM: CPT | Performed by: PHYSICIAN ASSISTANT

## 2018-10-20 PROCEDURE — G0463 HOSPITAL OUTPT CLINIC VISIT: HCPCS | Mod: 25

## 2018-10-20 PROCEDURE — 90471 IMMUNIZATION ADMIN: CPT

## 2018-10-20 RX ADMIN — CLOSTRIDIUM TETANI TOXOID ANTIGEN (FORMALDEHYDE INACTIVATED), CORYNEBACTERIUM DIPHTHERIAE TOXOID ANTIGEN (FORMALDEHYDE INACTIVATED), BORDETELLA PERTUSSIS TOXOID ANTIGEN (GLUTARALDEHYDE INACTIVATED), BORDETELLA PERTUSSIS FILAMENTOUS HEMAGGLUTININ ANTIGEN (FORMALDEHYDE INACTIVATED), BORDETELLA PERTUSSIS PERTACTIN ANTIGEN, AND BORDETELLA PERTUSSIS FIMBRIAE 2/3 ANTIGEN 0.5 ML: 5; 2; 2.5; 5; 3; 5 INJECTION, SUSPENSION INTRAMUSCULAR at 11:45

## 2018-10-20 ASSESSMENT — ENCOUNTER SYMPTOMS
CARDIOVASCULAR NEGATIVE: 1
WOUND: 1
PSYCHIATRIC NEGATIVE: 1
CONSTITUTIONAL NEGATIVE: 1
NEUROLOGICAL NEGATIVE: 1

## 2018-10-20 NOTE — ED PROVIDER NOTES
"  History     Chief Complaint   Patient presents with     Laceration     right buttock - 3 lacerations     The history is provided by the patient. No  was used.     Jani Langford is a 38 year old male who has 3 puncture wounds to his right buttocks. States just prior to coming to the hospital, he backed into \"something\" in the basement. States he does not know what it was and that he bled \"a lot.\" denies any other injury at this time.    Problem List:    Patient Active Problem List    Diagnosis Date Noted     Decreased sex drive 03/14/2017     Priority: Medium     Gastroesophageal reflux disease without esophagitis 03/14/2017     Priority: Medium     Bipolar disorder with psychotic features (H) 11/25/2016     Priority: Medium     History of mental disorder 10/28/2013     Priority: Medium     Posttraumatic stress disorder 10/28/2013     Priority: Medium     Insomnia 10/25/2013     Priority: Medium     Attention deficit disorder 08/07/2012     Priority: Medium        Past Medical History:    No past medical history on file.    Past Surgical History:    Past Surgical History:   Procedure Laterality Date     DENTAL SURGERY      pulled 2 teeth month ago     ENT SURGERY      sinus       Family History:    Family History   Problem Relation Age of Onset     Mental Illness Mother      Depression Mother        Social History:  Marital Status:  Single [1]  Social History   Substance Use Topics     Smoking status: Current Every Day Smoker     Packs/day: 0.75     Types: Cigarettes     Start date: 1/1/1995     Smokeless tobacco: Never Used      Comment: trying to quit     Alcohol use Yes      Comment: rare        Medications:      amoxicillin-clavulanate (AUGMENTIN) 875-125 MG per tablet   albuterol (PROAIR HFA/PROVENTIL HFA/VENTOLIN HFA) 108 (90 BASE) MCG/ACT Inhaler   ClonazePAM (KLONOPIN PO)   clonazePAM (KLONOPIN) 1 MG tablet   omeprazole (PRILOSEC) 20 MG CR capsule   QUEtiapine (SEROQUEL XR) 300 MG 24 hr " tablet   QUEtiapine (SEROQUEL XR) 50 MG TB24 24 hr tablet         Review of Systems   Constitutional: Negative.    Cardiovascular: Negative.    Skin: Positive for wound.   Neurological: Negative.    Psychiatric/Behavioral: Negative.        Physical Exam   BP: 118/67  Heart Rate: 104  Temp: 97.2  F (36.2  C)  Resp: 16  SpO2: 96 %      Physical Exam   Constitutional: He is oriented to person, place, and time. He appears well-developed and well-nourished. No distress.   Cardiovascular:   Tachycardia   Pulmonary/Chest: Effort normal.   Musculoskeletal:   Right buttocks: 3 lacerations, arranged in a triangle, each approx 1.5 cm. Mild bleeding. Moderate TTP. No edema/erythema.    Neurological: He is alert and oriented to person, place, and time.   Skin: He is not diaphoretic.   Psychiatric: He has a normal mood and affect.   Nursing note and vitals reviewed.      ED Course     ED Course     Laceration repair  Performed by: RAMÍREZ LEONG  Authorized by: RAMÍREZ LEONG   Consent: Verbal consent obtained.  Consent given by: patient  Patient identity confirmed: verbally with patient and provided demographic data  Body area: trunk (right buttocks)  Wound length (cm): 3 lacerations, each 1.5 cm long.  Foreign bodies: no foreign bodies  Tendon involvement: none  Nerve involvement: none  Vascular damage: no  Anesthesia: local infiltration    Anesthesia:  Local Anesthetic: lidocaine 1% with epinephrine  Anesthetic total: 3 mL    Sedation:  Patient sedated: no  Preparation: Patient was prepped and draped in the usual sterile fashion.  Irrigation solution: tap water (with hibiclense)  Amount of cleaning: standard  Debridement: none  Degree of undermining: none  Skin closure: 5-0 nylon  Number of sutures: 9  Technique: simple  Approximation: close  Approximation difficulty: simple  Dressing: 4x4 sterile gauze and antibiotic ointment  Comments: Bleeding resolved                Medications   Tdap (tetanus-diphtheria-acell  pertussis) (ADACEL) injection 0.5 mL (0.5 mLs Intramuscular Given 10/20/18 6415)     Pt observed x 20 minutes. Pt tolerated well.  Assessments & Plan (with Medical Decision Making)     I have reviewed the nursing notes.    I have reviewed the findings, diagnosis, plan and need for follow up with the patient.      Final diagnoses:   Laceration of buttock, unspecified laterality, initial encounter   Need for tetanus, diphtheria, and acellular pertussis (Tdap) vaccine in patient of adolescent age or older           Keep area clean and dry.  Educations sheet given  Pt educated and has no further questions.  Follow up with PCP in 10 days for suture removal, sooner if redness/swelling develop.  Follow up the UC/ED if fever/further concerns develop  10/20/2018   [facility]     Alanis Kingsley PA  10/20/18 7667

## 2018-10-20 NOTE — ED AVS SNAPSHOT
[facility]    [address] [place] [id number]    Phone:  [phone]                                       Jani Langford   MRN: [id number]    Department:  [facility]   Date of Visit:  10/20/2018           After Visit Summary Signature Page     I have received my discharge instructions, and my questions have been answered. I have discussed any challenges I see with this plan with the nurse or doctor.    ..........................................................................................................................................  Patient/Patient Representative Signature      ..........................................................................................................................................  Patient Representative Print Name and Relationship to Patient    ..................................................               ................................................  Date                                   Time    ..........................................................................................................................................  Reviewed by Signature/Title    ...................................................              ..............................................  Date                                               Time          22EPIC Rev 08/18

## 2018-10-20 NOTE — ED AVS SNAPSHOT
[facility]    [address]    [place] [address]    Phone:  [phone]                                       Jani Langford   MRN: [id number]    Department:  [facility]   Date of Visit:  10/20/2018           Patient Information     Date Of Birth          1980        Your diagnoses for this visit were:     Laceration of buttock, unspecified laterality, initial encounter     Need for tetanus, diphtheria, and acellular pertussis (Tdap) vaccine in patient of adolescent age or older        You were seen by Alanis Kingsley PA.      Follow-up Information     Follow up with Selwyn Lopez MD In 10 days.    Specialty:  Family Practice    Why:  For suture removal, sooner, If symptoms worsen    Contact information:    [address] [facility] [address] [phone]          Follow up with [facility].    Specialty:  EMERGENCY MEDICINE    Why:  If further concerns develop    Contact information:    [address]  [place] [address] [phone]    Additional information:    From [facility]: Take [address]. Turn left at [address]. Turn left at the first stoplight on [address]. At the first stop sign, take a right onto [address]. Take a left into the parking lot and continue through until you reach the North enterance of the building.       From [place]: Take [address]. Take the [address] ramp towards [place]. Turn left onto [address]. Take a slight right onto [address]. Turn left at the first stoplight on [address]. At the first stop sign, take a right onto [address]. Take a left into the parking lot and continue through until you reach the North enterance of the building.       From [place]: Take [address]. Take a right at [address]. At the first stop sign, take a right onto [address]. Take a left into the parking lot and continue through until  you reach the [place] enterance of the building.       Discharge References/Attachments     LACERATION, ALL (ENGLISH)      Your next 10 appointments already scheduled     Nov 09, 2018  8:20 AM CST   (Arrive by 8:05 AM)   Return Visit with Renee Vuong MD   [facility] ([facility] )    [address] [place] [address]  [place] [address] [phone]                 Review of your medicines      Our records show that you are taking the medicines listed below. If these are incorrect, please call your family doctor or clinic.        Dose / Directions Last dose taken    albuterol 108 (90 Base) MCG/ACT inhaler   Commonly known as:  PROAIR HFA/PROVENTIL HFA/VENTOLIN HFA   Dose:  2 puff   Quantity:  1 Inhaler        Inhale 2 puffs into the lungs every 6 hours as needed for shortness of breath / dyspnea or wheezing   Refills:  1        amoxicillin-clavulanate 875-125 MG per tablet   Commonly known as:  AUGMENTIN   Dose:  1 tablet   Quantity:  20 tablet        Take 1 tablet by mouth 2 times daily   Refills:  0        * KLONOPIN PO   Dose:  0.5 mg        Take 0.5 mg by mouth as needed for anxiety   Refills:  0        * clonazePAM 1 MG tablet   Commonly known as:  klonoPIN   Dose:  1 mg   Quantity:  30 tablet        Take 1 tablet (1 mg) by mouth At Bedtime   Refills:  3        omeprazole 20 MG CR capsule   Commonly known as:  priLOSEC   Quantity:  30 capsule        TAKE 1 CAPSULE (20 MG) BY MOUTH DAILY   Refills:  1        * QUEtiapine 300 MG 24 hr tablet   Commonly known as:  SEROquel XR   Dose:  300 mg   Quantity:  30 tablet        Take 1 tablet (300 mg) by mouth At Bedtime   Refills:  3        * QUEtiapine 50 MG Tb24 24 hr tablet   Commonly known as:  SEROquel XR   Dose:  50 mg   Quantity:  30 each        Take 1 tablet (50 mg) by mouth At Bedtime   Refills:  5        * Notice:  This list has 4 medication(s) that are the same as other medications prescribed for you. Read the directions  carefully, and ask your doctor or other care provider to review them with you.            Prescriptions were sent or printed at these locations (1 Prescription)                   [id number] - [place], [place] - [address]   [address] [place] [address]    Telephone:  [phone]   Fax:  [phone]   Hours:                  E-Prescribed (1 of 1)         amoxicillin-clavulanate (AUGMENTIN) 875-125 MG per tablet                Orders Needing Specimen Collection     None      Pending Results     No orders found from 10/18/2018 to 10/21/2018.            Pending Culture Results     No orders found from 10/18/2018 to 10/21/2018.            Thank you for choosing [facility]       Thank you for choosing [facility] for your care. Our goal is always to provide you with excellent care. Hearing back from our patients is one way we can continue to improve our services. Please take a few minutes to complete the written survey that you may receive in the mail after you visit with us. Thank you!        Care EveryWhere ID     This is your Care EveryWhere ID. This could be used by other organizations to access your [facility] medical records  [id number]        Equal Access to Services     MIKE SAHU : Hadtoya Bourgeois, nafisa templeton, oh sheldon. So [facility] [phone].    ATENCIÓN: Si habla español, tiene a davis disposición servicios gratuitos de asistencia lingüística. Llame al [phone].    We comply with applicable federal civil rights laws and [place] laws. We do not discriminate on the basis of race, color, national origin, age, disability, sex, sexual orientation, or gender identity.            After Visit Summary       This is your record. Keep this with you and show to your community pharmacist(s) and doctor(s) at your next visit.

## 2018-11-09 ENCOUNTER — TELEPHONE (OUTPATIENT)
Dept: PSYCHIATRY | Facility: OTHER | Age: 38
End: 2018-11-09

## 2018-11-09 ENCOUNTER — OFFICE VISIT (OUTPATIENT)
Dept: PSYCHIATRY | Facility: OTHER | Age: 38
End: 2018-11-09
Attending: PSYCHIATRY & NEUROLOGY
Payer: COMMERCIAL

## 2018-11-09 VITALS
WEIGHT: 190 LBS | SYSTOLIC BLOOD PRESSURE: 126 MMHG | BODY MASS INDEX: 25.07 KG/M2 | DIASTOLIC BLOOD PRESSURE: 94 MMHG | OXYGEN SATURATION: 97 % | HEART RATE: 95 BPM | TEMPERATURE: 97.7 F

## 2018-11-09 DIAGNOSIS — G47.00 PERSISTENT INSOMNIA: ICD-10-CM

## 2018-11-09 DIAGNOSIS — F43.10 PTSD (POST-TRAUMATIC STRESS DISORDER): Primary | ICD-10-CM

## 2018-11-09 PROCEDURE — 99213 OFFICE O/P EST LOW 20 MIN: CPT | Performed by: PSYCHIATRY & NEUROLOGY

## 2018-11-09 PROCEDURE — G0463 HOSPITAL OUTPT CLINIC VISIT: HCPCS

## 2018-11-09 ASSESSMENT — ANXIETY QUESTIONNAIRES
3. WORRYING TOO MUCH ABOUT DIFFERENT THINGS: NEARLY EVERY DAY
5. BEING SO RESTLESS THAT IT IS HARD TO SIT STILL: SEVERAL DAYS
2. NOT BEING ABLE TO STOP OR CONTROL WORRYING: NEARLY EVERY DAY
7. FEELING AFRAID AS IF SOMETHING AWFUL MIGHT HAPPEN: NOT AT ALL
IF YOU CHECKED OFF ANY PROBLEMS ON THIS QUESTIONNAIRE, HOW DIFFICULT HAVE THESE PROBLEMS MADE IT FOR YOU TO DO YOUR WORK, TAKE CARE OF THINGS AT HOME, OR GET ALONG WITH OTHER PEOPLE: VERY DIFFICULT
1. FEELING NERVOUS, ANXIOUS, OR ON EDGE: NEARLY EVERY DAY
GAD7 TOTAL SCORE: 16
6. BECOMING EASILY ANNOYED OR IRRITABLE: NEARLY EVERY DAY

## 2018-11-09 ASSESSMENT — PAIN SCALES - GENERAL: PAINLEVEL: NO PAIN (0)

## 2018-11-09 ASSESSMENT — PATIENT HEALTH QUESTIONNAIRE - PHQ9
5. POOR APPETITE OR OVEREATING: NEARLY EVERY DAY
SUM OF ALL RESPONSES TO PHQ QUESTIONS 1-9: 18

## 2018-11-09 NOTE — PROGRESS NOTES
"  PSYCHIATRY CLINIC PROGRESS NOTE   20 minute medication management, more than 50% of time spent counseling patient on medications, medication side effects, symptom history and management   SUBJECTIVE / INTERIM HISTORY                                                                        Social- with GF Phoebe and her kids Children- Phoebe's kids are 5, 7 , and 10 yo. Ozzy is the 13 yo Wiener dog  Last visit 9/28/18: -Increase Seroquel  mg HS to 350 mg.    continue Klonopin 1 mg HS and 0.5 mg. Otherwise we are opting out Latuda and maybe Saprhis in future...    - interested in medical MJ, mood, appetite  - appetite down still  - sleep has been even worse, would like to try seeing a sleep specialist again.   - he and Phoebe working more on relationship / communication and she is with today  - Klonopin is helping him more with mood , anxiety whereas in past when he took Xanax which \"I'd be sleeping in half hour\"  - has been dx PTSD in past: when was working with Bernabe Browning.....  - aggressive in past while manic.  SUBSTANCE USE- smokes 1/2 PPD since age 15 yo.  past use  Meth: ~2 years clean. , MJ. TX: [place] and [employer]. EtOH now rare.    SYMPTOMS- Decreased appetite, low energy, low mood. Insomnia still.  Passive SI: no intent or plan. Paranoia has improved. No manic sx.  nightmares, flashbacks, detached, angry outbursts, self-destructive, startle response, hypervigilance and fear Sleep now: about 2-3 hours then up for awhile, then another hour. His goal would be 6 continuous.  MEDICAL ROS- loss off appetite  SOB: usually at night. But sometimes during day as well not even with exertion.  increased appetite / weight  MEDICAL / SURGICAL HISTORY                   Patient Active Problem List   Diagnosis     Bipolar disorder with psychotic features (H)     Insomnia     History of mental disorder     Attention deficit disorder     Posttraumatic stress disorder     Decreased sex drive     Gastroesophageal " "reflux disease without esophagitis     ALLERGY   Abilify [aripiprazole]; Bee venom; Latex; Percocet [oxycodone-acetaminophen]; Toradol; Tramadol; and Tegretol [carbamazepine]  MEDICATIONS                                                                                             Current Outpatient Prescriptions   Medication Sig     albuterol (PROAIR HFA/PROVENTIL HFA/VENTOLIN HFA) 108 (90 BASE) MCG/ACT Inhaler Inhale 2 puffs into the lungs every 6 hours as needed for shortness of breath / dyspnea or wheezing     ClonazePAM (KLONOPIN PO) Take 0.5 mg by mouth as needed for anxiety     clonazePAM (KLONOPIN) 1 MG tablet Take 1 tablet (1 mg) by mouth At Bedtime     omeprazole (PRILOSEC) 20 MG CR capsule TAKE 1 CAPSULE (20 MG) BY MOUTH DAILY     QUEtiapine (SEROQUEL XR) 300 MG 24 hr tablet Take 1 tablet (300 mg) by mouth At Bedtime     QUEtiapine (SEROQUEL XR) 50 MG TB24 24 hr tablet Take 1 tablet (50 mg) by mouth At Bedtime     amoxicillin-clavulanate (AUGMENTIN) 875-125 MG per tablet Take 1 tablet by mouth 2 times daily (Patient not taking: Reported on 11/9/2018)     No current facility-administered medications for this visit.          PAST MEDICATION TRIALS    Prozac (fluoxetine), Zoloft (sertraline), Paxil (paroxetine) and Cymbalta (duloxetine). Paxil sexual SEs and constipation. Mirtazapine ineffective for insomnia.   Elavil (amitriptyline).   Lithium Carbonate, Depakote and Depakot ER (valproate) and Neurontin. Topamax didn't help (gabapentin). Lithium \"that was like eating pennies and nickels\" but helped / effective. Didn't like the blood level draws with lithium. Doesn't think has been on tegretol or trileptal.   Seroquel (quetiapine)., Abilify   no older antipsychotics.   Xanax (alprazolam). Doesn't think has been on Buspar   Ambien (zolpidem) and Lunesta (eszopiclone). Lunesta helped initial insomnia, NOT middle. Ambien made him violent and agitated.       VITALS   BP (!) 126/94 (BP Location: Right arm, Patient " Position: Sitting, Cuff Size: Adult Regular)  Pulse 95  Temp 97.7  F (36.5  C) (Tympanic)  Wt 86.2 kg (190 lb)  SpO2 97%  BMI 25.07 kg/m2     PHQ9                     [unfilled]  LABS                                                                                                                           Recent Labs   Lab Test  05/21/18   0856  11/22/17   0914   CHOL  203*  216*   HDL  24*  25*   LDL  142*  132*   TRIG  184*  295*     Lab Results   Component Value Date    TSH 1.27 05/21/2018     Last Basic Metabolic Panel:  Lab Results   Component Value Date     05/21/2018      Lab Results   Component Value Date    POTASSIUM 3.8 05/21/2018     Lab Results   Component Value Date    CHLORIDE 103 05/21/2018     Lab Results   Component Value Date    TOO 9.7 05/21/2018     Lab Results   Component Value Date    CO2 29 05/21/2018     Lab Results   Component Value Date    BUN 8 05/21/2018     Lab Results   Component Value Date    CR 1.09 05/21/2018     Lab Results   Component Value Date    GLC 81 05/21/2018          ms (on Seroquel) March 2017    MENTAL STATUS EXAM                                                                                        Alert. Oriented to person, place, and date / time. Casually groomed, calm, cooperative with good eye contact. No problems with speech. Psychomotor: NOT restless as he was last visit. Mood was irritable and affect was congruent to speech content and full range. Thought process, including associations, was unremarkable and thought content was devoid of suicidal and homicidal ideation and psychotic thought. No hallucinations. Insight was good. Judgment was intact and adequate for safety. Fund of knowledge was intact. Pt demonstrates no obvious problems with attention, concentration, language, recent or remote memory although these were not formally tested.     ASSESSMENT                                                                                                       HISTORICAL:  Initial psych note 9/14/16        NOTES:  Depakote in past didn't like it. Lithium in past metallic past but did work: doesn't want to go on it again. Trazodone: restless leg. Abilify: restless / akathisia. Tegretol rash  Jani Langford is a 39 yo male with history of bipolar disorder, PTSD and chem dep - on Seroquel total 700 mg and been on for awhile. Main issue is sleep : has not had luck with sleep meds so discussed I will certainly try but given he has not found past meds effective more unlikely we are going to find med that works. We tried Remeron with no luck. We tried Topamax and no luck. He saw Dr. Garcia  and started on Mirapex.We tried Belsomra and didn't help.    Our change of Seroquel to Abilify for Jani went well initially. Then akathisia really bad. He has been taking Seroquel 300 mg. I was thinking carbamazepine but he had rash in past. Thinking latuda but Jani deosn't want to take that given risk for akathisia (had bad akthisia wth Abilify). Appetite actually been down even on low dose Seroquel: last visit we increased rather than have him try Saphris our next option. He was on the fence as to increase Seroquel or try adding Paxil in but we ended up agreeing on increase from 300 mg to 350 mg last visit. Main issue is sleep or lack thereof -> it is really negatively impacting him. I'm going to try referral and I'll look into who we now have for sleep med around our region.       TREATMENT RISK STATEMENT:  The risks, benefits, alternatives and potential adverse effects have been explained and are understood by the pt.  The pt agrees to the treatment plan with the ability to do so.   The pt knows to call the clinic for any problems or access emergency care if needed.        DIAGNOSES                    Bipolar I disorder with history of psychotic features  PTSD       PLAN                                                                                                                     1)  MEDICATIONS:         --Continue SEroquel 350 mg.    continue Klonopin 1 mg HS and 0.5 mg. Otherwise we are opting out Latuda and maybe Saprhis in future...    2)  THERAPY:  No Change    3)  LABS:  lipid panel and glucose, LFTs, BMP including fasting glucose were done in May '17 and then in nov '17 as was glucose. EKG 3/2017.      4)  PT MONITOR [call for probs]:  Worsening symptoms, SI/HI, SEs from meds    5)  REFERRALS [CD, medical, other]: sleep referral    6)  RTC: ~1 month

## 2018-11-09 NOTE — NURSING NOTE
"Chief Complaint   Patient presents with     RECHECK     Mental health.  Patient states is not doing well on Seroquel.  Wants to discuss other options.       Initial BP (!) 126/94 (BP Location: Right arm, Patient Position: Sitting, Cuff Size: Adult Regular)  Pulse 95  Temp 97.7  F (36.5  C) (Tympanic)  Wt 86.2 kg (190 lb)  SpO2 97%  BMI 25.07 kg/m2 Estimated body mass index is 25.07 kg/(m^2) as calculated from the following:    Height as of 9/13/18: 1.854 m (6' 1\").    Weight as of this encounter: 86.2 kg (190 lb).  Medication Reconciliation: complete    KIERAN GARCIA LPN    "

## 2018-11-09 NOTE — TELEPHONE ENCOUNTER
Patient contacted and notified referral for sleep medicine placed through [facility] and they should contact him for appt.  If he does not hear by end of next week, please let us know.  He is ok with this

## 2018-11-09 NOTE — MR AVS SNAPSHOT
After Visit Summary   11/9/2018    Jani Langford    MRN: [id number]           Patient Information     Date Of Birth          1980        Visit Information        Provider Department      11/9/2018 8:20 AM Renee Vuong MD [facility]         Follow-ups after your visit        Your next 10 appointments already scheduled     Dec 07, 2018  8:40 AM CST   (Arrive by 8:25 AM)   Return Visit with Renee Vuong MD   [facility] [place] ([facility] )    [address] [place] [address]  [place] [id number] [phone]              Who to contact     If you have questions or need follow up information about today's clinic visit or your schedule please contact [facility] directly at [phone].  Normal or non-critical lab and imaging results will be communicated to you by MyChart, letter or phone within 4 business days after the clinic has received the results. If you do not hear from us within 7 days, please contact the clinic through MyChart or phone. If you have a critical or abnormal lab result, we will notify you by phone as soon as possible.  Submit refill requests through [employer] or call your pharmacy and they will forward the refill request to us. Please allow 3 business days for your refill to be completed.          Additional Information About Your Visit        Care EveryWhere ID     This is your Care EveryWhere ID. This could be used by other organizations to access your [facility] medical records  [id number]        Your Vitals Were     Pulse Temperature Pulse Oximetry BMI (Body Mass Index)          95 97.7  F (36.5  C) (Tympanic) 97% 25.07 kg/m2         Blood Pressure from Last 3 Encounters:   11/09/18 (!) 126/94   10/20/18 118/67   09/28/18 118/66    Weight from Last 3 Encounters:   11/09/18 86.2 kg (190 lb)   09/28/18 85.3 kg (188 lb)   09/13/18 85.3 kg (188 lb)              Today, you had the following     No  orders found for display       Primary Care Provider Office Phone # Fax #    Selwyn Lopez [phone]       [address] [place] [id number]        Equal Access to Services     BRADENDEBORAH ADELINA : Hadii kalia ku adiliao Somirtaali, waaxda luqadaha, qaybta kaalmada emmett, oh fernandezjennyfer kyaw. So [facility] [phone].    ATENCIÓN: Si habla español, tiene a daivs disposición servicios gratuitos de asistencia lingüística. Llame al [phone].    We comply with applicable federal civil rights laws and [place] laws. We do not discriminate on the basis of race, color, national origin, age, disability, sex, sexual orientation, or gender identity.            Thank you!     Thank you for choosing [facility]  for your care. Our goal is always to provide you with excellent care. Hearing back from our patients is one way we can continue to improve our services. Please take a few minutes to complete the written survey that you may receive in the mail after your visit with us. Thank you!             Your Updated Medication List - Protect others around you: Learn how to safely use, store and throw away your medicines at www.disposemymeds.org.          This list is accurate as of 11/9/18  9:00 AM.  Always use your most recent med list.                   Brand Name Dispense Instructions for use Diagnosis    albuterol 108 (90 Base) MCG/ACT inhaler    PROAIR HFA/PROVENTIL HFA/VENTOLIN HFA    1 Inhaler    Inhale 2 puffs into the lungs every 6 hours as needed for shortness of breath / dyspnea or wheezing    Dyspnea, unspecified type       * KLONOPIN PO      Take 0.5 mg by mouth as needed for anxiety        * clonazePAM 1 MG tablet    klonoPIN    30 tablet    Take 1 tablet (1 mg) by mouth At Bedtime    Bipolar I disorder (H)       omeprazole 20 MG CR capsule    priLOSEC    30 capsule    TAKE 1 CAPSULE (20 MG) BY MOUTH DAILY    Gastroesophageal reflux disease without  esophagitis       * QUEtiapine 300 MG 24 hr tablet    SEROquel XR    30 tablet    Take 1 tablet (300 mg) by mouth At Bedtime    Bipolar I disorder, most recent episode depressed (H)       * QUEtiapine 50 MG Tb24 24 hr tablet    SEROquel XR    30 each    Take 1 tablet (50 mg) by mouth At Bedtime    Bipolar I disorder, most recent episode depressed (H)       * Notice:  This list has 4 medication(s) that are the same as other medications prescribed for you. Read the directions carefully, and ask your doctor or other care provider to review them with you.

## 2018-11-10 ASSESSMENT — ANXIETY QUESTIONNAIRES: GAD7 TOTAL SCORE: 16

## 2018-11-12 DIAGNOSIS — F31.9 BIPOLAR I DISORDER (H): ICD-10-CM

## 2018-11-12 RX ORDER — CLONAZEPAM 1 MG/1
1 TABLET ORAL AT BEDTIME
Qty: 30 TABLET | Refills: 3 | OUTPATIENT
Start: 2018-11-12

## 2018-11-12 NOTE — TELEPHONE ENCOUNTER
clonazePAM (KLONOPIN) 1 MG tablet      Last Written Prescription Date:  6/22/18  Last Fill Quantity: 30,   # refills: 3  Last Office Visit: 9/13/18  Future Office visit:    Next 5 appointments (look out 90 days)     Dec 07, 2018  8:40 AM CST   (Arrive by 8:25 AM)   [facility] with [facility]ene[facility]   [facility]    [address] [place] [id number]   [phone]                   Routing refill request to provider for review/approval because:  Drug not on the [facility], [facility] or [facility] refill protocol or controlled substance

## 2018-11-14 ENCOUNTER — TELEPHONE (OUTPATIENT)
Dept: PSYCHIATRY | Facility: OTHER | Age: 38
End: 2018-11-14

## 2018-11-14 DIAGNOSIS — F31.9 BIPOLAR I DISORDER (H): ICD-10-CM

## 2018-11-14 DIAGNOSIS — F41.1 GAD (GENERALIZED ANXIETY DISORDER): Primary | ICD-10-CM

## 2018-11-14 RX ORDER — CLONAZEPAM 0.5 MG/1
0.5 TABLET ORAL PRN
Qty: 30 TABLET | Refills: 3 | Status: SHIPPED | OUTPATIENT
Start: 2018-11-14 | End: 2019-03-11

## 2018-11-14 RX ORDER — CLONAZEPAM 1 MG/1
1 TABLET ORAL AT BEDTIME
Qty: 30 TABLET | Refills: 3 | Status: SHIPPED | OUTPATIENT
Start: 2018-11-14 | End: 2019-03-08

## 2018-11-14 NOTE — TELEPHONE ENCOUNTER
Refill request for Klonopin 1 mg and 0.5 mg.  Received incoming VM from patient stating that he is out of Klonopin.  He has no more refills.  Last fill for 1 mg Klonopin was on 6-22-18 with 3 refills.  Patient uses [facility] [place].  Pharmacy have faxed.  Thank you, please advise.

## 2018-11-15 ENCOUNTER — DOCUMENTATION ONLY (OUTPATIENT)
Dept: SLEEP MEDICINE | Facility: HOSPITAL | Age: 38
End: 2018-11-15

## 2018-11-15 NOTE — PROGRESS NOTES
STOP RAPHAEL       Name: Jani Langford MRN# [id number]   Age: 38 year old YOB: 1980     Stop Bang questionnaire completed with a score of >3 to allow for HST     Have you been told you snore loudly (louder than talking or loud enough to be heard through doors)? NO    Do you often feel tired, fatigued, or sleepy during the daytime? YES    Has anyone observed you stop breathing during your sleep? NO    Do you have or are you being treated for high blood pressure? NO    Is your BMI greater than 35? NO    Is your neck size circumference 16 inches or greater? NO    Are you over 50 years old? NO    Stop Bang Score (# of yes): 2
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
## 2018-11-15 NOTE — PROGRESS NOTES
"Chart review prior to sleep testing.    Patient Summary:  39 yo M with history of PTSD, bipolar I w/o psychotic features with primary concern of persistent insomnia.    I was able to review the notes from Dr. Vuong.  Current medication regiment of seroquel XR 350mg, consider paroxetine.  Primary concerns of chronic insomnia.  Prior insomnia meds of mirtazepine, suvorexant, pramipexole, clonazepam 1mg PO at bedtime.    STOP-BANG score of 2.  [place] score of 8.  BMI of ~25.    Per questionnaire:  \"Not sleeping at all even with seroquel.\"    Sxs for 5-10 years.  Yes to 3+ caffeine / day, yes to within 6 hours of bed time.    Sleep pattern:  Non-work days.  In bed between 9-9:30pm, will take ~2 hours to fall asleep, will awaken 12+ per night, feels he is awake for 2-4 hours, up at 4am.  Notes mind racing.  During awakenings, will walk around / smoke / watch TV.  Total sleep time of 5-6 hours in a 24 hour period.  Will take 3 naps per week, for 2 hours each.  No to read / eat / TV / work / computer / phone in bed.    Yes to sleep walking.    No to snoring, observed apnea.  Yes to waking up choking / gasping, family history of JUAN ANTONIO.    A/P:  39 yo M with history of PTSD, bipolar I w/o psychotic features with primary concern of persistent insomnia.    1.)  Chronic sleep onset and maintenance insomnia  2.)  Suspicion for sleep-state misperception  3.)  Very poor stimulus control (smoke, watch TV), potential significant daytime napping.    I would not see a strong indication for PSG or HST.  Recommend clinic consultation with actigraphy and sleep diaries for 2 weeks.            "

## 2018-11-19 DIAGNOSIS — F31.30 BIPOLAR I DISORDER, MOST RECENT EPISODE DEPRESSED (H): ICD-10-CM

## 2018-11-19 NOTE — TELEPHONE ENCOUNTER
Seroquel       Last Written Prescription Date:  7/13/2018  Last Fill Quantity: 30,   # refills: 3  Last Office Visit: 9/13/2018  Future Office visit:    Next 5 appointments (look out 90 days)     Dec 07, 2018  8:40 AM CST   (Arrive by 8:25 AM)   [facility]   [facility]    [address] [place] [id number] [phone]

## 2018-11-20 RX ORDER — QUETIAPINE 300 MG/1
TABLET, FILM COATED, EXTENDED RELEASE ORAL
Qty: 90 TABLET | Refills: 3 | Status: SHIPPED | OUTPATIENT
Start: 2018-11-20 | End: 2018-12-07 | Stop reason: DRUGHIGH

## 2018-12-07 ENCOUNTER — TELEPHONE (OUTPATIENT)
Dept: PSYCHIATRY | Facility: OTHER | Age: 38
End: 2018-12-07

## 2018-12-07 ENCOUNTER — OFFICE VISIT (OUTPATIENT)
Dept: PSYCHIATRY | Facility: OTHER | Age: 38
End: 2018-12-07
Attending: AUDIOLOGIST
Payer: COMMERCIAL

## 2018-12-07 VITALS
BODY MASS INDEX: 25.07 KG/M2 | DIASTOLIC BLOOD PRESSURE: 54 MMHG | TEMPERATURE: 98.3 F | HEART RATE: 76 BPM | SYSTOLIC BLOOD PRESSURE: 96 MMHG | WEIGHT: 190 LBS

## 2018-12-07 DIAGNOSIS — R06.00 DYSPNEA, UNSPECIFIED TYPE: ICD-10-CM

## 2018-12-07 DIAGNOSIS — F31.9 BIPOLAR I DISORDER (H): Primary | ICD-10-CM

## 2018-12-07 PROCEDURE — G0463 HOSPITAL OUTPT CLINIC VISIT: HCPCS

## 2018-12-07 PROCEDURE — 99214 OFFICE O/P EST MOD 30 MIN: CPT | Performed by: PSYCHIATRY & NEUROLOGY

## 2018-12-07 RX ORDER — QUETIAPINE 400 MG/1
400 TABLET, FILM COATED, EXTENDED RELEASE ORAL AT BEDTIME
Qty: 30 TABLET | Refills: 3 | Status: SHIPPED | OUTPATIENT
Start: 2018-12-07 | End: 2019-04-05

## 2018-12-07 RX ORDER — ALBUTEROL SULFATE 90 UG/1
2 AEROSOL, METERED RESPIRATORY (INHALATION) EVERY 6 HOURS PRN
Qty: 1 INHALER | Refills: 1 | Status: SHIPPED | OUTPATIENT
Start: 2018-12-07 | End: 2019-09-23

## 2018-12-07 ASSESSMENT — ANXIETY QUESTIONNAIRES
IF YOU CHECKED OFF ANY PROBLEMS ON THIS QUESTIONNAIRE, HOW DIFFICULT HAVE THESE PROBLEMS MADE IT FOR YOU TO DO YOUR WORK, TAKE CARE OF THINGS AT HOME, OR GET ALONG WITH OTHER PEOPLE: VERY DIFFICULT
1. FEELING NERVOUS, ANXIOUS, OR ON EDGE: NEARLY EVERY DAY
2. NOT BEING ABLE TO STOP OR CONTROL WORRYING: NEARLY EVERY DAY
5. BEING SO RESTLESS THAT IT IS HARD TO SIT STILL: SEVERAL DAYS
7. FEELING AFRAID AS IF SOMETHING AWFUL MIGHT HAPPEN: SEVERAL DAYS
3. WORRYING TOO MUCH ABOUT DIFFERENT THINGS: NEARLY EVERY DAY
GAD7 TOTAL SCORE: 16
6. BECOMING EASILY ANNOYED OR IRRITABLE: MORE THAN HALF THE DAYS

## 2018-12-07 ASSESSMENT — PATIENT HEALTH QUESTIONNAIRE - PHQ9
SUM OF ALL RESPONSES TO PHQ QUESTIONS 1-9: 14
5. POOR APPETITE OR OVEREATING: NEARLY EVERY DAY

## 2018-12-07 ASSESSMENT — PAIN SCALES - GENERAL: PAINLEVEL: NO PAIN (0)

## 2018-12-07 NOTE — NURSING NOTE
"Chief Complaint   Patient presents with     RECHECK     PTSD.  Sleep issues       Initial BP 96/54 (BP Location: Right arm, Patient Position: Sitting, Cuff Size: Adult Regular)  Pulse 76  Temp 98.3  F (36.8  C) (Tympanic)  Wt 86.2 kg (190 lb)  BMI 25.07 kg/m2 Estimated body mass index is 25.07 kg/(m^2) as calculated from the following:    Height as of 9/13/18: 1.854 m (6' 1\").    Weight as of this encounter: 86.2 kg (190 lb).  Medication Reconciliation: complete    KIERAN GARCIA LPN    "

## 2018-12-07 NOTE — TELEPHONE ENCOUNTER
I called the sleep lab as Dr. Renee Vuong had put a referral in Nov. 2018. They will call the patient and send him a sleep diary to record in and try to get him scheduled with telemedicine in the sleep lab as soon as possible, per Cindy in the sleep lab.

## 2018-12-07 NOTE — PROGRESS NOTES
"  PSYCHIATRY CLINIC PROGRESS NOTE   20 minute medication management, more than 50% of time spent counseling patient on medications, medication side effects, symptom history and management   SUBJECTIVE / INTERIM HISTORY                                                                        Social- with ISABEL Sandhu and her kids Children- Phoebe's kids are 5, 7 , and 12 yo. Ozzy is the 15 yo Wiener dog  Last visit 9/28/18: -Increase Seroquel  mg HS to 350 mg.    continue Klonopin 1 mg HS and 0.5 mg. Otherwise we are opting out Latuda and maybe Saprhis in future...    - interested in medical MJ, mood, appetite  - appetite down still. Some days doesn't eat at all  - depressed still, thankfully not quite as depressed as he was just month or couple ago when he was having constant SI  - Klonopin is helping him more with mood , anxiety whereas in past when he took Xanax which \"I'd be sleeping in half hour\"  - has been dx PTSD in past: when was working with Bernabe Browning.....  - aggressive in past while manic. Hasn't had any major ordeals but has been agitated, snappy, irritability.   SUBSTANCE USE- smokes 1/2 PPD since age 17 yo.  past use  Meth: ~2 years clean. , MJ. TX: [place] and [employer]. EtOH now rare.    SYMPTOMS- Decreased appetite, low energy, low mood. Insomnia still.  Passive SI: no intent or plan. Paranoia has improved. No manic sx.  nightmares, flashbacks, detached, angry outbursts, self-destructive, startle response, hypervigilance and fear Sleep now: about 2-3 hours then up for awhile, then another hour. His goal would be 6 continuous.  MEDICAL ROS- loss off appetite  SOB: usually at night. But sometimes during day as well not even with exertion.  increased appetite / weight  MEDICAL / SURGICAL HISTORY                   Patient Active Problem List   Diagnosis     Bipolar disorder with psychotic features (H)     Insomnia     History of mental disorder     Attention deficit disorder     Posttraumatic " "stress disorder     Decreased sex drive     Gastroesophageal reflux disease without esophagitis     ALLERGY   Abilify [aripiprazole]; Bee venom; Latex; Percocet [oxycodone-acetaminophen]; Toradol; Tramadol; and Tegretol [carbamazepine]  MEDICATIONS                                                                                             Current Outpatient Prescriptions   Medication Sig     albuterol (PROAIR HFA/PROVENTIL HFA/VENTOLIN HFA) 108 (90 BASE) MCG/ACT Inhaler Inhale 2 puffs into the lungs every 6 hours as needed for shortness of breath / dyspnea or wheezing     clonazePAM (KLONOPIN) 0.5 MG tablet Take 1 tablet (0.5 mg) by mouth as needed for anxiety     clonazePAM (KLONOPIN) 1 MG tablet Take 1 tablet (1 mg) by mouth At Bedtime     omeprazole (PRILOSEC) 20 MG CR capsule TAKE 1 CAPSULE (20 MG) BY MOUTH DAILY     QUEtiapine (SEROQUEL XR) 300 MG 24 hr tablet TAKE 1 TABLET BY MOUTH AT BEDTIME     QUEtiapine (SEROQUEL XR) 50 MG TB24 24 hr tablet Take 1 tablet (50 mg) by mouth At Bedtime     No current facility-administered medications for this visit.          PAST MEDICATION TRIALS    Prozac (fluoxetine), Zoloft (sertraline), Paxil (paroxetine) and Cymbalta (duloxetine). Paxil sexual SEs and constipation. Mirtazapine ineffective for insomnia.   Elavil (amitriptyline).   Lithium Carbonate, Depakote and Depakot ER (valproate) and Neurontin. Topamax didn't help (gabapentin). Lithium \"that was like eating pennies and nickels\" but helped / effective. Didn't like the blood level draws with lithium. Doesn't think has been on tegretol or trileptal.   Seroquel (quetiapine)., Abilify   no older antipsychotics.   Xanax (alprazolam). Doesn't think has been on Buspar   Ambien (zolpidem) and Lunesta (eszopiclone). Lunesta helped initial insomnia, NOT middle. Ambien made him violent and agitated.       VITALS   BP 96/54 (BP Location: Right arm, Patient Position: Sitting, Cuff Size: Adult Regular)  Pulse 76  Temp 98.3  F " (36.8  C) (Tympanic)  Wt 86.2 kg (190 lb)  BMI 25.07 kg/m2     PHQ9                     [unfilled]  LABS                                                                                                                           Recent Labs   Lab Test  05/21/18   0856  11/22/17   0914   CHOL  203*  216*   HDL  24*  25*   LDL  142*  132*   TRIG  184*  295*     Lab Results   Component Value Date    TSH 1.27 05/21/2018     Last Basic Metabolic Panel:  Lab Results   Component Value Date     05/21/2018      Lab Results   Component Value Date    POTASSIUM 3.8 05/21/2018     Lab Results   Component Value Date    CHLORIDE 103 05/21/2018     Lab Results   Component Value Date    TOO 9.7 05/21/2018     Lab Results   Component Value Date    CO2 29 05/21/2018     Lab Results   Component Value Date    BUN 8 05/21/2018     Lab Results   Component Value Date    CR 1.09 05/21/2018     Lab Results   Component Value Date    GLC 81 05/21/2018          ms (on Seroquel) March 2017    MENTAL STATUS EXAM                                                                                        Alert. Oriented to person, place, and date / time. Casually groomed, calm, cooperative with good eye contact. No problems with speech. Psychomotor: NOT restless as he was last visit. Mood was irritable and affect was congruent to speech content and full range. Thought process, including associations, was unremarkable and thought content was devoid of suicidal and homicidal ideation and psychotic thought. No hallucinations. Insight was good. Judgment was intact and adequate for safety. Fund of knowledge was intact. Pt demonstrates no obvious problems with attention, concentration, language, recent or remote memory although these were not formally tested.     ASSESSMENT                                                                                                      HISTORICAL:  Initial psych note 9/14/16        NOTES:  Depakote in  past didn't like it. Lithium in past metallic past but did work: doesn't want to go on it again. Trazodone: restless leg. Abilify: restless / akathisia. Tegretol rash  Jani Langford is a 39 yo male with history of bipolar disorder, PTSD and chem dep - on Seroquel total 700 mg and been on for awhile. Main issue is sleep : has not had luck with sleep meds so discussed I will certainly try but given he has not found past meds effective more unlikely we are going to find med that works. We tried Remeron with no luck. We tried Topamax and no luck. He saw Dr. Garcia  and started on Mirapex.We tried Belsomra and didn't help.    Our change of Seroquel to Abilify for Jani went well initially. Then akathisia really bad. He has been taking Seroquel 300 mg. I was thinking carbamazepine but he had rash in past. Thinking latuda but Jani deosn't want to take that given risk for akathisia (had bad akthisia wth Abilify). Appetite actually been down even on low dose Seroquel: last visit we increased rather than have him try Saphris our next option. He was on the fence as to increase Seroquel or try adding Paxil in but we ended up agreeing on increase from 300 mg to 350 mg last visit. Main issue is sleep or lack thereof -> it is really negatively impacting him. I'm going to try referral and I'll look into who we now have for sleep med around our region.    Today we agreed on increase of Seroquel XR from 350 mg to 400 mg daily.        TREATMENT RISK STATEMENT:  The risks, benefits, alternatives and potential adverse effects have been explained and are understood by the pt.  The pt agrees to the treatment plan with the ability to do so.   The pt knows to call the clinic for any problems or access emergency care if needed.        DIAGNOSES                    Bipolar I disorder with history of psychotic features  PTSD       PLAN                                                                                                                     1)  MEDICATIONS:         --Increase  SEroquel 350 mg to 400 mg .    continue Klonopin 1 mg HS and 0.5 mg. Otherwise we are opting out Latuda and maybe Saprhis in future...    2)  THERAPY:  No Change    3)  LABS:  lipid panel and glucose, LFTs, BMP including fasting glucose were done in May '17 and then in nov '17 as was glucose. EKG 3/2017.      4)  PT MONITOR [call for probs]:  Worsening symptoms, SI/HI, SEs from meds    5)  REFERRALS [CD, medical, other]: sleep referral    6)  RTC: ~1 month

## 2018-12-07 NOTE — MR AVS SNAPSHOT
After Visit Summary   12/7/2018    Jani Langford    MRN: [id number]           Patient Information     Date Of Birth          1980        Visit Information        Provider Department      12/7/2018 8:40 AM Renee Vuong MD [facility]        Today's Diagnoses     Bipolar I disorder (H)    -  1    Dyspnea, unspecified type           Follow-ups after your visit        Who to contact     If you have questions or need follow up information about today's clinic visit or your schedule please contact [facility] directly at [phone].  Normal or non-critical lab and imaging results will be communicated to you by MyChart, letter or phone within 4 business days after the clinic has received the results. If you do not hear from us within 7 days, please contact the clinic through MyChart or phone. If you have a critical or abnormal lab result, we will notify you by phone as soon as possible.  Submit refill requests through [employer] or call your pharmacy and they will forward the refill request to us. Please allow 3 business days for your refill to be completed.          Additional Information About Your Visit        Care EveryWhere ID     This is your Care EveryWhere ID. This could be used by other organizations to access your [facility] medical records  [id number]        Your Vitals Were     Pulse Temperature BMI (Body Mass Index)             76 98.3  F (36.8  C) (Tympanic) 25.07 kg/m2          Blood Pressure from Last 3 Encounters:   12/07/18 96/54   11/09/18 (!) 126/94   10/20/18 118/67    Weight from Last 3 Encounters:   12/07/18 86.2 kg (190 lb)   11/09/18 86.2 kg (190 lb)   09/28/18 85.3 kg (188 lb)              Today, you had the following     No orders found for display         Today's Medication Changes          These changes are accurate as of 12/7/18  9:30 AM.  If you have any questions, ask your nurse or doctor.               These medicines have  changed or have updated prescriptions.        Dose/Directions    QUEtiapine  MG 24 hr tablet   Commonly known as:  SEROQUEL XR   This may have changed:    - medication strength  - how much to take  - Another medication with the same name was removed. Continue taking this medication, and follow the directions you see here.   Used for:  Bipolar I disorder (H)   Changed by:  Renee Vuong MD        Dose:  400 mg   Take 1 tablet (400 mg) by mouth At Bedtime   Quantity:  30 tablet   Refills:  3            Where to get your medicines      These medications were sent to [id number] - [place], [place] - [address]  [address] [address]     Phone:  [phone]     albuterol 108 (90 Base) MCG/ACT inhaler    QUEtiapine  MG 24 hr tablet                Primary Care Provider Office Phone # Fax #    Selwyn Lopez [phone]       [address] [place] [address] E  [facility] [address]        Equal Access to Services     DEBORAH [facility] HospitalBERTIN : Hadii kalia ku hadasho Soomaali, waaxda luqadaha, qaybta kaalmada adeegyada, waxay idiin hayaan dulce [facility] . So [facility] [phone].    ATENCIÓN: Si habla español, tiene a davis disposición servicios gratuitos de asistencia lingüística. Llame al [phone].    We comply with applicable federal civil rights laws and [place] laws. We do not discriminate on the basis of race, color, national origin, age, disability, sex, sexual orientation, or gender identity.            Thank you!     Thank you for choosing [facility]  for your care. Our goal is always to provide you with excellent care. Hearing back from our patients is one way we can continue to improve our services. Please take a few minutes to complete the written survey that you may receive in the mail after your visit with us. Thank you!             Your Updated Medication List - Protect others around you: Learn how to safely use, store and throw away your medicines  at www.disposemymeds.org.          This list is accurate as of 12/7/18  9:30 AM.  Always use your most recent med list.                   Brand Name Dispense Instructions for use Diagnosis    albuterol 108 (90 Base) MCG/ACT inhaler    PROAIR HFA/PROVENTIL HFA/VENTOLIN HFA    1 Inhaler    Inhale 2 puffs into the lungs every 6 hours as needed for shortness of breath / dyspnea or wheezing    Dyspnea, unspecified type       * clonazePAM 0.5 MG tablet    klonoPIN    30 tablet    Take 1 tablet (0.5 mg) by mouth as needed for anxiety    TAMRA (generalized anxiety disorder)       * clonazePAM 1 MG tablet    klonoPIN    30 tablet    Take 1 tablet (1 mg) by mouth At Bedtime    Bipolar I disorder (H)       omeprazole 20 MG DR capsule    priLOSEC    30 capsule    TAKE 1 CAPSULE (20 MG) BY MOUTH DAILY    Gastroesophageal reflux disease without esophagitis       QUEtiapine  MG 24 hr tablet    SEROQUEL XR    30 tablet    Take 1 tablet (400 mg) by mouth At Bedtime    Bipolar I disorder (H)       * Notice:  This list has 2 medication(s) that are the same as other medications prescribed for you. Read the directions carefully, and ask your doctor or other care provider to review them with you.

## 2018-12-08 ASSESSMENT — ANXIETY QUESTIONNAIRES: GAD7 TOTAL SCORE: 16

## 2018-12-19 ENCOUNTER — TELEPHONE (OUTPATIENT)
Dept: SLEEP MEDICINE | Facility: HOSPITAL | Age: 38
End: 2018-12-19

## 2018-12-31 DIAGNOSIS — K21.9 GASTROESOPHAGEAL REFLUX DISEASE WITHOUT ESOPHAGITIS: ICD-10-CM

## 2019-01-18 ENCOUNTER — OFFICE VISIT (OUTPATIENT)
Dept: PSYCHIATRY | Facility: OTHER | Age: 39
End: 2019-01-18
Attending: PSYCHIATRY & NEUROLOGY
Payer: COMMERCIAL

## 2019-01-18 VITALS
HEART RATE: 96 BPM | OXYGEN SATURATION: 96 % | TEMPERATURE: 97.8 F | BODY MASS INDEX: 25.07 KG/M2 | WEIGHT: 190 LBS | DIASTOLIC BLOOD PRESSURE: 60 MMHG | SYSTOLIC BLOOD PRESSURE: 114 MMHG

## 2019-01-18 DIAGNOSIS — F31.30 BIPOLAR I DISORDER, MOST RECENT EPISODE DEPRESSED (H): Primary | ICD-10-CM

## 2019-01-18 PROCEDURE — 99213 OFFICE O/P EST LOW 20 MIN: CPT | Performed by: PSYCHIATRY & NEUROLOGY

## 2019-01-18 PROCEDURE — G0463 HOSPITAL OUTPT CLINIC VISIT: HCPCS

## 2019-01-18 ASSESSMENT — ANXIETY QUESTIONNAIRES
6. BECOMING EASILY ANNOYED OR IRRITABLE: SEVERAL DAYS
IF YOU CHECKED OFF ANY PROBLEMS ON THIS QUESTIONNAIRE, HOW DIFFICULT HAVE THESE PROBLEMS MADE IT FOR YOU TO DO YOUR WORK, TAKE CARE OF THINGS AT HOME, OR GET ALONG WITH OTHER PEOPLE: VERY DIFFICULT
5. BEING SO RESTLESS THAT IT IS HARD TO SIT STILL: NOT AT ALL
GAD7 TOTAL SCORE: 8
2. NOT BEING ABLE TO STOP OR CONTROL WORRYING: MORE THAN HALF THE DAYS
7. FEELING AFRAID AS IF SOMETHING AWFUL MIGHT HAPPEN: SEVERAL DAYS
1. FEELING NERVOUS, ANXIOUS, OR ON EDGE: SEVERAL DAYS
3. WORRYING TOO MUCH ABOUT DIFFERENT THINGS: MORE THAN HALF THE DAYS

## 2019-01-18 ASSESSMENT — PATIENT HEALTH QUESTIONNAIRE - PHQ9
5. POOR APPETITE OR OVEREATING: SEVERAL DAYS
SUM OF ALL RESPONSES TO PHQ QUESTIONS 1-9: 11

## 2019-01-18 ASSESSMENT — PAIN SCALES - GENERAL: PAINLEVEL: NO PAIN (0)

## 2019-01-18 NOTE — PROGRESS NOTES
"  PSYCHIATRY CLINIC PROGRESS NOTE   20 minute medication management, more than 50% of time spent counseling patient on medications, medication side effects, symptom history and management   SUBJECTIVE / INTERIM HISTORY                                                                        Social- with ISABEL Sandhu and her kids Children- Phoebe's kids are 5, 7 , and 10 yo. Ozzy is the 13 yo Wiener dog  Last visit 12/7/18: Increase SEroquel 350 mg to 400 mg .  continue Klonopin 1 mg HS and 0.5 mg. Otherwise we are opting out Latuda and maybe Saprhis in future...    - he did increase Seroquel to 400 mg. ? Helped, not sure.99  - interested in medical MJ, mood, appetite. Met with [facility] and they needs some documentation from me  - appetite down still. Some days doesn't eat at all  - Klonopin is helping him more with mood , anxiety whereas in past when he took Xanax which \"I'd be sleeping in half hour\"  - has been dx PTSD in past: when was working with Bernabe Metcalfjenni.....  - aggressive in past while manic. Hasn't had any major ordeals but has been agitated, snappy, irritability.     SUBSTANCE USE- smokes 1/2 PPD since age 15 yo.  past use  Meth: ~2 + years clean. , MJ. TX: [place] and [facility] [employer]. EtOH now rare.    SYMPTOMS- Decreased appetite, low energy, low mood. Insomnia still.  Passive SI: no intent or plan. Paranoia has improved. No manic sx.  nightmares, flashbacks, detached, angry outbursts, self-destructive, startle response, hypervigilance and fear Sleep now: about 2-3 hours then up for awhile, then another hour. His goal would be 6 continuous.  MEDICAL ROS- loss off appetite  SOB: usually at night. But sometimes during day as well not even with exertion.  increased appetite / weight  MEDICAL / SURGICAL HISTORY                   Patient Active Problem List   Diagnosis     Bipolar disorder with psychotic features (H)     Insomnia     History of mental disorder     Attention deficit disorder     Posttraumatic " "stress disorder     Decreased sex drive     Gastroesophageal reflux disease without esophagitis     ALLERGY   Abilify [aripiprazole]; Bee venom; Latex; Percocet [oxycodone-acetaminophen]; Toradol; Tramadol; and Tegretol [carbamazepine]  MEDICATIONS                                                                                             Current Outpatient Medications   Medication Sig     albuterol (PROAIR HFA/PROVENTIL HFA/VENTOLIN HFA) 108 (90 Base) MCG/ACT inhaler Inhale 2 puffs into the lungs every 6 hours as needed for shortness of breath / dyspnea or wheezing     clonazePAM (KLONOPIN) 0.5 MG tablet Take 1 tablet (0.5 mg) by mouth as needed for anxiety     clonazePAM (KLONOPIN) 1 MG tablet Take 1 tablet (1 mg) by mouth At Bedtime     omeprazole (PRILOSEC) 20 MG DR capsule TAKE 1 CAPSULE (20 MG) BY MOUTH DAILY     QUEtiapine ER (SEROQUEL XR) 400 MG 24 hr tablet Take 1 tablet (400 mg) by mouth At Bedtime     No current facility-administered medications for this visit.          PAST MEDICATION TRIALS    Prozac (fluoxetine), Zoloft (sertraline), Paxil (paroxetine) and Cymbalta (duloxetine). Paxil sexual SEs and constipation. Mirtazapine ineffective for insomnia.   Elavil (amitriptyline).   Lithium Carbonate, Depakote and Depakot ER (valproate) and Neurontin. Topamax didn't help (gabapentin). Lithium \"that was like eating pennies and nickels\" but helped / effective. Didn't like the blood level draws with lithium. Doesn't think has been on tegretol or trileptal.   Seroquel (quetiapine)., Abilify   no older antipsychotics.   Xanax (alprazolam). Doesn't think has been on Buspar   Ambien (zolpidem) and Lunesta (eszopiclone). Lunesta helped initial insomnia, NOT middle. Ambien made him violent and agitated.       VITALS   /60 (BP Location: Left arm, Patient Position: Sitting, Cuff Size: Adult Regular)   Pulse 96   Temp 97.8  F (36.6  C) (Tympanic)   Wt 86.2 kg (190 lb)   SpO2 96%   BMI 25.07 kg/m       PHQ9   "                   [unfilled]  LABS                                                                                                                           Recent Labs   Lab Test  05/21/18   0856  11/22/17   0914   CHOL  203*  216*   HDL  24*  25*   LDL  142*  132*   TRIG  184*  295*     Lab Results   Component Value Date    TSH 1.27 05/21/2018     Last Basic Metabolic Panel:  Lab Results   Component Value Date     05/21/2018      Lab Results   Component Value Date    POTASSIUM 3.8 05/21/2018     Lab Results   Component Value Date    CHLORIDE 103 05/21/2018     Lab Results   Component Value Date    TOO 9.7 05/21/2018     Lab Results   Component Value Date    CO2 29 05/21/2018     Lab Results   Component Value Date    BUN 8 05/21/2018     Lab Results   Component Value Date    CR 1.09 05/21/2018     Lab Results   Component Value Date    GLC 81 05/21/2018          ms (on Seroquel) March 2017    MENTAL STATUS EXAM                                                                                        Alert. Oriented to person, place, and date / time. Casually groomed, calm, cooperative with good eye contact. No problems with speech. Psychomotor: unremarkable . Mood was irritable and affect was congruent to speech content and full range. Thought process, including associations, was unremarkable and thought content was devoid of suicidal and homicidal ideation and psychotic thought. No hallucinations. Insight was good. Judgment was intact and adequate for safety. Fund of knowledge was intact. Pt demonstrates no obvious problems with attention, concentration, language, recent or remote memory although these were not formally tested.     ASSESSMENT                                                                                                      HISTORICAL:  Initial psych note 9/14/16        NOTES:  Depakote in past didn't like it. Lithium in past metallic past but did work: doesn't want to go on it  again. Trazodone: restless leg. Abilify: restless / akathisia. Tegretol rash  Jani Langford is a 39 yo male with history of bipolar disorder, PTSD and chem dep - on Seroquel total 700 mg and been on for awhile. Main issue is sleep : has not had luck with sleep meds so discussed I will certainly try but given he has not found past meds effective more unlikely we are going to find med that works. We tried Remeron with no luck. We tried Topamax and no luck. He saw Dr. Garcia  and started on Mirapex.We tried Belsomra and didn't help.    Our change of Seroquel to Abilify for Jani went well initially. Then he had akathisia really bad. He has been taking Seroquel 400 mg. I was thinking carbamazepine but he had rash in past. Thinking latuda but Jani deosn't want to take that given risk for akathisia (had bad akthisia wth Abilify). Appetite actually been down even on Seroquel: last visit we increased rather than have him try Saphris our next option. He was on the fence as to increase Seroquel or try adding Paxil in but we ended up agreeing on increase from 350 to 400 mg last visit. His main issues are low mood, insomnia, poor appetite, and agitation / irritability.     Today we agreed on continue Seroquel 400 mg daily , Klonopin. No medication changes --- we have really not had good luck with medications for Jani.      Jani is meeting with [facility] and he does in fact have PTSD which qualifies him.        TREATMENT RISK STATEMENT:  The risks, benefits, alternatives and potential adverse effects have been explained and are understood by the pt.  The pt agrees to the treatment plan with the ability to do so.   The pt knows to call the clinic for any problems or access emergency care if needed.        DIAGNOSES                    PTSD   Bipolar I disorder with history of psychotic features      PLAN                                                                                                                    1)   MEDICATIONS:         -- Continue Seroquel 400 mg .    continue Klonopin 1 mg HS and 0.5 mg. Otherwise we are opting out Latuda and maybe Saprhis in future...    2)  THERAPY:  No Change    3)  LABS:  lipid panel and glucose, LFTs, BMP including fasting glucose were done in May '17 and then in nov '17 as was glucose. EKG 3/2017.      4)  PT MONITOR [call for probs]:  Worsening symptoms, SI/HI, SEs from meds    5)  REFERRALS [CD, medical, other]: sleep referral    6)  RTC: ~1 month

## 2019-01-18 NOTE — NURSING NOTE
"Chief Complaint   Patient presents with     RECHECK     Mental health.       Initial /60 (BP Location: Left arm, Patient Position: Sitting, Cuff Size: Adult Regular)   Pulse 96   Temp 97.8  F (36.6  C) (Tympanic)   Wt 86.2 kg (190 lb)   SpO2 96%   BMI 25.07 kg/m   Estimated body mass index is 25.07 kg/m  as calculated from the following:    Height as of 9/13/18: 1.854 m (6' 1\").    Weight as of this encounter: 86.2 kg (190 lb).  Medication Reconciliation: complete    KIERAN GARCIA LPN    "

## 2019-01-19 ASSESSMENT — ANXIETY QUESTIONNAIRES: GAD7 TOTAL SCORE: 8

## 2019-03-07 DIAGNOSIS — F31.9 BIPOLAR I DISORDER (H): ICD-10-CM

## 2019-03-07 NOTE — TELEPHONE ENCOUNTER
klonopin      Last Written Prescription Date:  11/14/18  Last Fill Quantity: 30,   # refills: 2  Last Office Visit: 1/18/19  Future Office visit:       Routing refill request to provider for review/approval because:  Drug not on the [facility], [facility] or [facility] refill protocol or controlled substance

## 2019-03-08 RX ORDER — CLONAZEPAM 1 MG/1
1 TABLET ORAL AT BEDTIME
Qty: 30 TABLET | Refills: 2 | Status: SHIPPED | OUTPATIENT
Start: 2019-03-08 | End: 2019-06-07 | Stop reason: DRUGHIGH

## 2019-03-11 DIAGNOSIS — F41.1 GAD (GENERALIZED ANXIETY DISORDER): ICD-10-CM

## 2019-03-11 RX ORDER — CLONAZEPAM 0.5 MG/1
0.5 TABLET ORAL PRN
Qty: 30 TABLET | Refills: 3 | Status: SHIPPED | OUTPATIENT
Start: 2019-03-11 | End: 2019-08-22

## 2019-04-12 ENCOUNTER — OFFICE VISIT (OUTPATIENT)
Dept: PSYCHIATRY | Facility: OTHER | Age: 39
End: 2019-04-12
Attending: PSYCHIATRY & NEUROLOGY
Payer: COMMERCIAL

## 2019-04-12 VITALS
SYSTOLIC BLOOD PRESSURE: 130 MMHG | BODY MASS INDEX: 27.71 KG/M2 | TEMPERATURE: 98.9 F | HEART RATE: 99 BPM | OXYGEN SATURATION: 95 % | DIASTOLIC BLOOD PRESSURE: 76 MMHG | WEIGHT: 210 LBS

## 2019-04-12 DIAGNOSIS — G47.00 PERSISTENT INSOMNIA: Primary | ICD-10-CM

## 2019-04-12 PROCEDURE — 99214 OFFICE O/P EST MOD 30 MIN: CPT | Performed by: PSYCHIATRY & NEUROLOGY

## 2019-04-12 PROCEDURE — G0463 HOSPITAL OUTPT CLINIC VISIT: HCPCS

## 2019-04-12 RX ORDER — RAMELTEON 8 MG/1
8 TABLET ORAL AT BEDTIME
Qty: 30 TABLET | Refills: 3 | Status: SHIPPED | OUTPATIENT
Start: 2019-04-12 | End: 2019-06-05

## 2019-04-12 ASSESSMENT — PATIENT HEALTH QUESTIONNAIRE - PHQ9
5. POOR APPETITE OR OVEREATING: NEARLY EVERY DAY
SUM OF ALL RESPONSES TO PHQ QUESTIONS 1-9: 14

## 2019-04-12 ASSESSMENT — PAIN SCALES - GENERAL: PAINLEVEL: NO PAIN (0)

## 2019-04-12 ASSESSMENT — ANXIETY QUESTIONNAIRES
7. FEELING AFRAID AS IF SOMETHING AWFUL MIGHT HAPPEN: SEVERAL DAYS
IF YOU CHECKED OFF ANY PROBLEMS ON THIS QUESTIONNAIRE, HOW DIFFICULT HAVE THESE PROBLEMS MADE IT FOR YOU TO DO YOUR WORK, TAKE CARE OF THINGS AT HOME, OR GET ALONG WITH OTHER PEOPLE: VERY DIFFICULT
1. FEELING NERVOUS, ANXIOUS, OR ON EDGE: MORE THAN HALF THE DAYS
3. WORRYING TOO MUCH ABOUT DIFFERENT THINGS: MORE THAN HALF THE DAYS
6. BECOMING EASILY ANNOYED OR IRRITABLE: MORE THAN HALF THE DAYS
GAD7 TOTAL SCORE: 13
5. BEING SO RESTLESS THAT IT IS HARD TO SIT STILL: SEVERAL DAYS
2. NOT BEING ABLE TO STOP OR CONTROL WORRYING: MORE THAN HALF THE DAYS

## 2019-04-12 NOTE — NURSING NOTE
"Chief Complaint   Patient presents with     RECHECK     Mental health.  Patient c/o sleeping issues since time change.       Initial /76 (BP Location: Right arm, Patient Position: Sitting, Cuff Size: Adult Regular)   Pulse 99   Temp 98.9  F (37.2  C) (Tympanic)   Wt 95.3 kg (210 lb)   SpO2 95%   BMI 27.71 kg/m   Estimated body mass index is 27.71 kg/m  as calculated from the following:    Height as of 9/13/18: 1.854 m (6' 1\").    Weight as of this encounter: 95.3 kg (210 lb).  Medication Reconciliation: complete    KIERAN GARCIA LPN    "

## 2019-04-12 NOTE — PROGRESS NOTES
"  PSYCHIATRY CLINIC PROGRESS NOTE   20 minute medication management, more than 50% of time spent counseling patient on medications, medication side effects, symptom history and management   SUBJECTIVE / INTERIM HISTORY                                                                        Social- with GF Phoebe and her kids Children- Phoebe's kids are 5, 7 , and 12 yo. Ozzy is the 13 yo Wiener dog  Last visit 12/7/18: Increase SEroquel 350 mg to 400 mg .  continue Klonopin 1 mg HS and 0.5 mg. Otherwise we are opting out Latuda and maybe Saprhis in future...    - issues with his meds. Kristi doesn't have Klonopin 0.5 mg lot of the time nor his Seroquel  - Go to sleep around 9:30 or 10 and up at 2 am. Sleep is even worse  - LAST VISIT appetite down still. Some days doesn't eat at all. Now appetite been up as is weight  - Klonopin is helping him more with mood , anxiety whereas in past when he took Xanax which \"I'd be sleeping in half hour\"  - has been dx PTSD in past: when was working with Bernabe Browning.....  - aggressive in past while manic. Hasn't had any major ordeals but has been agitated, snappy, irritability.   - depression but not as bad as it was    SUBSTANCE USE- smokes 1/2 PPD since age 15 yo.  past use  Meth: ~2 + years clean. , MJ. TX: [place] and [employer]. EtOH now rare.    SYMPTOMS-  low energy, low mood. Insomnia still.  Passive SI: no intent or plan. Paranoia has improved. No manic sx.  nightmares, flashbacks, detached, angry outbursts, self-destructive, startle response, hypervigilance and fear Sleep now: about 2-3 hours then up for awhile, then another hour. His goal would be 6 continuous.  MEDICAL ROS- appetite increased (in relation to Seroquel)  SOB: usually at night. But sometimes during day as well not even with exertion.  increased appetite / weight  MEDICAL / SURGICAL HISTORY                   Patient Active Problem List   Diagnosis     Bipolar disorder with psychotic features (H)     " "Insomnia     History of mental disorder     Attention deficit disorder     Posttraumatic stress disorder     Decreased sex drive     Gastroesophageal reflux disease without esophagitis     ALLERGY   Abilify [aripiprazole]; Bee venom; Latex; Percocet [oxycodone-acetaminophen]; Toradol; Tramadol; and Tegretol [carbamazepine]  MEDICATIONS                                                                                             Current Outpatient Medications   Medication Sig     albuterol (PROAIR HFA/PROVENTIL HFA/VENTOLIN HFA) 108 (90 Base) MCG/ACT inhaler Inhale 2 puffs into the lungs every 6 hours as needed for shortness of breath / dyspnea or wheezing     clonazePAM (KLONOPIN) 0.5 MG tablet Take 1 tablet (0.5 mg) by mouth as needed for anxiety     clonazePAM (KLONOPIN) 1 MG tablet Take 1 tablet (1 mg) by mouth At Bedtime     omeprazole (PRILOSEC) 20 MG DR capsule TAKE 1 CAPSULE (20 MG) BY MOUTH DAILY     QUEtiapine ER (SEROQUEL XR) 400 MG 24 hr tablet TAKE 1 TABLET BY MOUTH NIGHTLY AT BEDTIME     No current facility-administered medications for this visit.          PAST MEDICATION TRIALS    Prozac (fluoxetine), Zoloft (sertraline), Paxil (paroxetine) and Cymbalta (duloxetine). Paxil sexual SEs and constipation. Mirtazapine ineffective for insomnia.   Elavil (amitriptyline).   Lithium Carbonate, Depakote and Depakot ER (valproate) and Neurontin. Topamax didn't help (gabapentin). Lithium \"that was like eating pennies and nickels\" but helped / effective. Didn't like the blood level draws with lithium. Doesn't think has been on tegretol or trileptal.   Seroquel (quetiapine)., Abilify   no older antipsychotics.   Xanax (alprazolam). Doesn't think has been on Buspar   Ambien (zolpidem) and Lunesta (eszopiclone). Lunesta helped initial insomnia, NOT middle. Ambien made him violent and agitated.       VITALS   /76 (BP Location: Right arm, Patient Position: Sitting, Cuff Size: Adult Regular)   Pulse 99   Temp 98.9 "  F (37.2  C) (Tympanic)   Wt 95.3 kg (210 lb)   SpO2 95%   BMI 27.71 kg/m       PHQ9                     [unfilled]  LABS                                                                                                                           Recent Labs   Lab Test  05/21/18   0856  11/22/17   0914   CHOL  203*  216*   HDL  24*  25*   LDL  142*  132*   TRIG  184*  295*     Lab Results   Component Value Date    TSH 1.27 05/21/2018     Last Basic Metabolic Panel:  Lab Results   Component Value Date     05/21/2018      Lab Results   Component Value Date    POTASSIUM 3.8 05/21/2018     Lab Results   Component Value Date    CHLORIDE 103 05/21/2018     Lab Results   Component Value Date    TOO 9.7 05/21/2018     Lab Results   Component Value Date    CO2 29 05/21/2018     Lab Results   Component Value Date    BUN 8 05/21/2018     Lab Results   Component Value Date    CR 1.09 05/21/2018     Lab Results   Component Value Date    GLC 81 05/21/2018          ms (on Seroquel) March 2017    MENTAL STATUS EXAM                                                                                        Alert. Oriented to person, place, and date / time. Casually groomed, calm, cooperative with good eye contact. No problems with speech. Psychomotor: unremarkable . Mood was irritable and affect was congruent to speech content and full range. Thought process, including associations, was unremarkable and thought content was devoid of suicidal and homicidal ideation and psychotic thought. No hallucinations. Insight was good. Judgment was intact and adequate for safety. Fund of knowledge was intact. Pt demonstrates no obvious problems with attention, concentration, language, recent or remote memory although these were not formally tested.     ASSESSMENT                                                                                                      HISTORICAL:  Initial psych note 9/14/16        NOTES:  Depakote in  past didn't like it. Lithium in past metallic past but did work: doesn't want to go on it again. Trazodone: restless leg. Abilify: restless / akathisia. Tegretol rash. Recalls gabapentin years ago ineffective  Jani Langford is a 39 yo male with history of bipolar disorder, PTSD and chem dep - on Seroquel total 700 mg and been on for awhile. Main issue is sleep : has not had luck with sleep meds so discussed I will certainly try but given he has not found past meds effective more unlikely we are going to find med that works. We tried Remeron with no luck. We tried Topamax and no luck. He saw Dr. Garcia  and started on Mirapex.We tried Belsomra and didn't help.    Our change of Seroquel to Abilify for Jani went well initially. Then he had akathisia really bad. He has been taking Seroquel 400 mg. I was thinking carbamazepine but he had rash in past. Thinking latuda but Jani deosn't want to take that given risk for akathisia (had bad akthisia wth Abilify). ? Saphris in future instead of the Seroquel.     Today we agreed on continue Seroquel 400 mg daily , Klonopin. Today we agreed having him try Rozerem for insomnia     Jani is meeting with [facility] and he does in fact have PTSD which qualifies him.        TREATMENT RISK STATEMENT:  The risks, benefits, alternatives and potential adverse effects have been explained and are understood by the pt.  The pt agrees to the treatment plan with the ability to do so.   The pt knows to call the clinic for any problems or access emergency care if needed.        DIAGNOSES                    PTSD   Bipolar I disorder with history of psychotic features      PLAN                                                                                                                    1)  MEDICATIONS:         --Start Rozerem 8 mg HS Continue Seroquel 400 mg .    continue Klonopin 1 mg HS and 0.5 mg.     2)  THERAPY:  No Change    3)  LABS:  lipid panel and glucose, LFTs, BMP  including fasting glucose were done in May '17 and then in nov '17 as was glucose. EKG 3/2017.      4)  PT MONITOR [call for probs]:  Worsening symptoms, SI/HI, SEs from meds    5)  REFERRALS [CD, medical, other]: sleep referral : has apptmt in next couple weeks    6)  RTC: ~2 weeks

## 2019-04-13 ASSESSMENT — ANXIETY QUESTIONNAIRES: GAD7 TOTAL SCORE: 13

## 2019-04-23 ENCOUNTER — OFFICE VISIT (OUTPATIENT)
Dept: SLEEP MEDICINE | Facility: HOSPITAL | Age: 39
End: 2019-04-23
Attending: FAMILY MEDICINE
Payer: COMMERCIAL

## 2019-04-23 VITALS
WEIGHT: 210 LBS | DIASTOLIC BLOOD PRESSURE: 72 MMHG | BODY MASS INDEX: 27.83 KG/M2 | HEIGHT: 73 IN | HEART RATE: 80 BPM | SYSTOLIC BLOOD PRESSURE: 110 MMHG | OXYGEN SATURATION: 98 % | RESPIRATION RATE: 14 BRPM

## 2019-04-23 DIAGNOSIS — R06.81 APNEA: ICD-10-CM

## 2019-04-23 DIAGNOSIS — G47.00 INSOMNIA, UNSPECIFIED TYPE: Primary | ICD-10-CM

## 2019-04-23 PROCEDURE — G0463 HOSPITAL OUTPT CLINIC VISIT: HCPCS

## 2019-04-23 PROCEDURE — 99243 OFF/OP CNSLTJ NEW/EST LOW 30: CPT | Performed by: FAMILY MEDICINE

## 2019-04-23 ASSESSMENT — MIFFLIN-ST. JEOR: SCORE: 1936.43

## 2019-04-23 NOTE — PATIENT INSTRUCTIONS
In order to help your stay at the Sleep Center to be as comfortable as possible and to obtain the best sleep study possible, the Sleep Center Staff has established the following guidelines:    1.  Please attempt to be here 15 minutes prior to your scheduled appointment time.  If you anticipate being late or you cannot make your overnight appointment, please call us at [phone] or call [phone] and ask for the Sleep Center.  PLEASE MAKE EVERY ATTEMPT TO MAKE YOUR APPOINTMENT.  A SLEEP TECHNICIAN AND A SLEEP ROOM HAS BEEN RESERVED JUST FOR YOU.    2. When you arrive at [facility], please park in the upper lot, by [address].  Enter the hospital at the Emergency Room Entrance.  Follow the signs to the Emergency Room Admitting Desk and tell them you are here for a sleep study in the [facility].    3. Do not stop any medications, unless specifically requested.  Be sure to bring all medications that you need with you.    4. Do not use any hair creams or gels, moisturizers, rinses or sprays the day of your study.  FOR MALES:  if you are usually clean shaven, please shave before you come in; FOR FEMALES:  do not wear make-up or be prepared to remove it.  This will improve the quality of the study.    5. Please DO NOT USE CAFFEINE OR ALCOHOL after 2:00 PM the day of your test unless advised otherwise.    6. Do not take any naps the day of your test.    7. Attachment of monitoring equipment will take approximately one hour, including an explanation of the test.    8. Bring comfortable night clothes to sleep in, two-piece, cotton pajamas or shorts are best.    9. If you have a pillow that you prefer using, please bring it with you; but do not forget to take it home with you in the morning.    10. Most of our studies are complete by approximately 7:00 AM.  In most instances we may wake you during your normal wake time or the time you request to be awakened.    If you have any special needs or  questions related to this information or your test, please feel free to call the Sleep Disorder Center at [phone] or [phone] and ask for the Sleep Disorder Center.  Please make every effort to keep your appointment.  A sleep technician and a sleep room have been reserved just for you.  In the event that you are unable to make your appointment, please call as soon as possible to notify us of your cancellation.

## 2019-04-23 NOTE — NURSING NOTE
Patient ID checked with name and date of birth. Reviewed allergies and home medications. Took Vitals and brief medical history.   Scheduled actigraphy and sleep test  Reviewed instructions and patient and spouse had  A good understanding

## 2019-04-27 NOTE — PROGRESS NOTES
"  Sleep Consultation:    Date on this visit: 4/23/2019    Jani Langford is sent by Selwyn Lopez for a sleep consultation regarding chronic sleep maintenance insomnia.    Primary Physician: Selwyn Lopez     Chief Complaint: \"I can't sleep.\"    HPI:  Jani Langford is a 37 yo M with history of PTSD, bipolar disorder with psychotic features, polysubstance abuse (hx of meth, EtOH), ADD, GERD who presents for consultation in regards to chronic sleep maintenance insomnia.  He is seen with his partner today.    He did have two prior visits with Dr. Garcia on 11/30/2016, note of many trials of sleep medications with minimal response, question of possible PLMD so trial of pramipexole.  Follow-up visit on 5/11/2017 where patient was very agitated / borderline threatening so Dr. Garcia terminated the visit and declined to see patient in future.    He returns today under recommendation from Dr. Vuong in regards to potential options for treatment of chronic sleep maintenance insomnia.  As noted, he has tried the following medications I can identify without significant response:  - Seroquel ER up to 600mg  - Clonazepam 0.5mg  - Amitriptyline 100-150mg  - Alprazolam 0.5mg -> possible mild benefit, but significant AM grogginess  - Zolpidem  - Eszopiclone  - Rozerem  - Suvorexant  - Pramipexole    He feels he does not have significantly difficulty falling asleep, but difficulty sleeping for more than a few hours.    Reports taking his night time medication between 9-9:30pm.  In bed between 10-10:30pm and asleep quickly.  Will awaken spontaneously between 1-2am, will then lay in bed until ~4:30am and is unclear if any napping during the day.    He has awoken with a gasping sensation.  No report of snoring or observed apnea.    Caffeine use is unclear.    Nicotine, states he has quit.    Iban EtoH use.    THC - yes, unclear amount.    Family history of mother with JUAN ANTONIO.    Allergies:    Allergies   Allergen Reactions "     Abilify [Aripiprazole]      Side effects     Bee Venom      Latex Hives     Percocet [Oxycodone-Acetaminophen]      Short of breath vomiting     Toradol      seizure     Tramadol      Short of breath and vomiting     Tegretol [Carbamazepine] Rash       Medications:    Current Outpatient Medications   Medication Sig Dispense Refill     albuterol (PROAIR HFA/PROVENTIL HFA/VENTOLIN HFA) 108 (90 Base) MCG/ACT inhaler Inhale 2 puffs into the lungs every 6 hours as needed for shortness of breath / dyspnea or wheezing 1 Inhaler 1     clonazePAM (KLONOPIN) 0.5 MG tablet Take 1 tablet (0.5 mg) by mouth as needed for anxiety 30 tablet 3     clonazePAM (KLONOPIN) 1 MG tablet Take 1 tablet (1 mg) by mouth At Bedtime 30 tablet 2     omeprazole (PRILOSEC) 20 MG DR capsule TAKE 1 CAPSULE (20 MG) BY MOUTH DAILY 30 capsule 2     QUEtiapine ER (SEROQUEL XR) 400 MG 24 hr tablet TAKE 1 TABLET BY MOUTH NIGHTLY AT BEDTIME 30 tablet 6     ramelteon (ROZEREM) 8 MG tablet Take 1 tablet (8 mg) by mouth At Bedtime 30 tablet 3       Problem List:  Patient Active Problem List    Diagnosis Date Noted     Decreased sex drive 03/14/2017     Priority: Medium     Gastroesophageal reflux disease without esophagitis 03/14/2017     Priority: Medium     Bipolar disorder with psychotic features (H) 11/25/2016     Priority: Medium     History of mental disorder 10/28/2013     Priority: Medium     Posttraumatic stress disorder 10/28/2013     Priority: Medium     Insomnia 10/25/2013     Priority: Medium     Attention deficit disorder 08/07/2012     Priority: Medium        Past Medical/Surgical History:  No past medical history on file.  Past Surgical History:   Procedure Laterality Date     DENTAL SURGERY      pulled 2 teeth month ago     ENT SURGERY      sinus       Social History:  Social History     Socioeconomic History     Marital status: Single     Spouse name: Not on file     Number of children: Not on file     Years of education: Not on file      "Highest education level: Not on file   Occupational History     Not on file   Social Needs     Financial resource strain: Not on file     Food insecurity:     Worry: Not on file     Inability: Not on file     Transportation needs:     Medical: Not on file     Non-medical: Not on file   Tobacco Use     Smoking status: Former Smoker     Packs/day: 0.75     Types: Cigarettes     Start date: 1995     Last attempt to quit: 3/12/2019     Years since quittin.1     Smokeless tobacco: Never Used   Substance and Sexual Activity     Alcohol use: Yes     Comment: rare     Drug use: Yes     Types: Marijuana     Sexual activity: Not on file   Lifestyle     Physical activity:     Days per week: Not on file     Minutes per session: Not on file     Stress: Not on file   Relationships     Social connections:     Talks on phone: Not on file     Gets together: Not on file     Attends Latter day service: Not on file     Active member of club or organization: Not on file     Attends meetings of clubs or organizations: Not on file     Relationship status: Not on file     Intimate partner violence:     Fear of current or ex partner: Not on file     Emotionally abused: Not on file     Physically abused: Not on file     Forced sexual activity: Not on file   Other Topics Concern     Parent/sibling w/ CABG, MI or angioplasty before 65F 55M? No   Social History Narrative     Not on file       Family History:  Family History   Problem Relation Age of Onset     Mental Illness Mother      Depression Mother        Review of Systems:  A complete review of systems reviewed by me is negative with the exeption of what has been mentioned in the history of present illness.    Physical Examination:  Vitals: /72   Pulse 80   Resp 14   Ht 1.854 m (6' 1\")   Wt 95.3 kg (210 lb)   SpO2 98%   BMI 27.71 kg/m    BMI= Body mass index is 27.71 kg/m .         [place] Total Score 2016   Total score - [place] 6            GENERAL APPEARANCE: " healthy, alert and no distress  RESP: lungs clear to auscultation - no rales, rhonchi or wheezes  CV: regular rates and rhythm, normal S1 S2, no S3 or S4 and no murmur, click or rub  PSYCH: mentation appears normal, affect normal/bright, anxious and often swearing    Impression/Plan:    Jani Langford is a 39 yo M with history of PTSD, bipolar disorder with psychotic features, polysubstance abuse (hx of meth, EtOH), ADD, GERD who presents for consultation in regards to chronic sleep maintenance insomnia.    1.)  Chronic sleep maintenance insomnia   - Can't rule out sleep-state misperception   - Borderline increased risk for JUAN ANTONIO with family history of JUAN ANTONIO, male gender, chronic insomia, gasping arousals   - Significant co-morbid psychiatric illness   - Minimal constraints on time.    We spent a majority of our visit discussing options for evaluation and treatment.  I feel he strongly needs actigraphy to evaluate for sleep-state misperception, but is not currently available at this location.  Will arrange in-lab PSG to rule out possible JUAN ANTONIO / other primary sleep disorders.    If he is not felt to have sleep-state misperception or other primary sleep disorder, we could consider a trial of short duration medication to be taken carefully at the time of his early AM awakening (~1-2am).  Options of zaleplon, triazolam, estazolam.    Plan as given to patient as above.    I have spent 60 minutes with this patient today in which greater than 50% of this time was spent in the counseling / coordination of care regarding chronic insomnia.      Drew Pressley MD    CC: Selwyn Lopez

## 2019-05-07 DIAGNOSIS — K21.9 GASTROESOPHAGEAL REFLUX DISEASE WITHOUT ESOPHAGITIS: ICD-10-CM

## 2019-05-07 NOTE — TELEPHONE ENCOUNTER
omeprazole (PRILOSEC) 20 MG DR capsule  Last Written Prescription Date:  1/2/19  Last Fill Quantity: 30,   # refills: 2  Last Office Visit: 9/13/18  Future Office visit:    Next 5 appointments (look out 90 days)    May 24, 2019  8:20 AM CDT  (Arrive by 8:05 AM)  Return Visit with Renee Vuong MD  [facility] ([facility] ) [address] [place] [id number] [phone]

## 2019-05-24 ENCOUNTER — OFFICE VISIT (OUTPATIENT)
Dept: PSYCHIATRY | Facility: OTHER | Age: 39
End: 2019-05-24
Attending: PSYCHIATRY & NEUROLOGY
Payer: COMMERCIAL

## 2019-05-24 VITALS
OXYGEN SATURATION: 91 % | HEART RATE: 80 BPM | BODY MASS INDEX: 27.71 KG/M2 | WEIGHT: 210 LBS | SYSTOLIC BLOOD PRESSURE: 112 MMHG | TEMPERATURE: 97.3 F | DIASTOLIC BLOOD PRESSURE: 72 MMHG

## 2019-05-24 DIAGNOSIS — Z79.899 ENCOUNTER FOR LONG-TERM (CURRENT) USE OF MEDICATIONS: Primary | ICD-10-CM

## 2019-05-24 PROCEDURE — 99214 OFFICE O/P EST MOD 30 MIN: CPT | Performed by: PSYCHIATRY & NEUROLOGY

## 2019-05-24 PROCEDURE — G0463 HOSPITAL OUTPT CLINIC VISIT: HCPCS

## 2019-05-24 ASSESSMENT — ANXIETY QUESTIONNAIRES
IF YOU CHECKED OFF ANY PROBLEMS ON THIS QUESTIONNAIRE, HOW DIFFICULT HAVE THESE PROBLEMS MADE IT FOR YOU TO DO YOUR WORK, TAKE CARE OF THINGS AT HOME, OR GET ALONG WITH OTHER PEOPLE: VERY DIFFICULT
7. FEELING AFRAID AS IF SOMETHING AWFUL MIGHT HAPPEN: NOT AT ALL
1. FEELING NERVOUS, ANXIOUS, OR ON EDGE: MORE THAN HALF THE DAYS
6. BECOMING EASILY ANNOYED OR IRRITABLE: SEVERAL DAYS
5. BEING SO RESTLESS THAT IT IS HARD TO SIT STILL: SEVERAL DAYS
GAD7 TOTAL SCORE: 11
2. NOT BEING ABLE TO STOP OR CONTROL WORRYING: MORE THAN HALF THE DAYS
3. WORRYING TOO MUCH ABOUT DIFFERENT THINGS: MORE THAN HALF THE DAYS

## 2019-05-24 ASSESSMENT — PATIENT HEALTH QUESTIONNAIRE - PHQ9
SUM OF ALL RESPONSES TO PHQ QUESTIONS 1-9: 12
5. POOR APPETITE OR OVEREATING: NEARLY EVERY DAY

## 2019-05-24 ASSESSMENT — PAIN SCALES - GENERAL: PAINLEVEL: NO PAIN (0)

## 2019-05-24 NOTE — NURSING NOTE
"Chief Complaint   Patient presents with     RECHECK     Mental health.  Patient reports Rozerem is not helping with sleep.  No difference noticed.       Initial /72 (BP Location: Left arm, Patient Position: Sitting, Cuff Size: Adult Regular)   Pulse 80   Temp 97.3  F (36.3  C) (Tympanic)   Wt 95.3 kg (210 lb)   SpO2 91%   BMI 27.71 kg/m   Estimated body mass index is 27.71 kg/m  as calculated from the following:    Height as of 4/23/19: 1.854 m (6' 1\").    Weight as of this encounter: 95.3 kg (210 lb).  Medication Reconciliation: complete    KIERAN GARCIA LPN    "

## 2019-05-24 NOTE — PROGRESS NOTES
PSYCHIATRY CLINIC PROGRESS NOTE   20 minute medication management, more than 50% of time spent counseling patient on medications, medication side effects, symptom history and management   SUBJECTIVE / INTERIM HISTORY                                                                        Social- with ISABEL Sandhu and her kids Children- Phoebe's kids are 5, 7 , and 10 yo. Ozzy is the 15 yo Wiener dog  Last visit 4/12/19: Start Rozerem 8 mg HS Continue Seroquel 400 mg .    continue Klonopin 1 mg HS and 0.5 mg.     - Rozerem NOT helping  - on 4/23/19 he saw sleep medicine. Goes to sleep around 9:30 or 10 and up at 2 am.   -  For example today been up since 3:30 because unable to go to sleep  - klonopin helps with anxiety more than sleep.   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  - aggressive in past while manic. Hasn't had any major ordeals but has been agitated, snappy, irritability.   - depression but not as bad as it was    SUBSTANCE USE- smokes 1/2 PPD since age 17 yo.  past use  Meth: ~2 + years clean. , MJ. [place]: [place] and [employer]. EtOH now rare.    SYMPTOMS-  low energy, low mood. Insomnia still.  Passive SI: no intent or plan. Paranoia has improved. No manic sx.  nightmares, flashbacks, detached, angry outbursts, self-destructive, startle response, hypervigilance and fear Sleep now: about 2-3 hours then up for awhile, then another hour. His goal would be 6 continuous.  MEDICAL ROS- appetite increased (in relation to Seroquel)  SOB: usually at night. But sometimes during day as well not even with exertion.  increased appetite / weight  MEDICAL / SURGICAL HISTORY                   Patient Active Problem List   Diagnosis     Bipolar disorder with psychotic features (H)     Insomnia     History of mental disorder     Attention deficit disorder     Posttraumatic stress disorder     Decreased sex drive     Gastroesophageal reflux disease without esophagitis     ALLERGY   Abilify  "[aripiprazole]; Bee venom; Latex; Percocet [oxycodone-acetaminophen]; Toradol; Tramadol; and Tegretol [carbamazepine]  MEDICATIONS                                                                                             Current Outpatient Medications   Medication Sig     albuterol (PROAIR HFA/PROVENTIL HFA/VENTOLIN HFA) 108 (90 Base) MCG/ACT inhaler Inhale 2 puffs into the lungs every 6 hours as needed for shortness of breath / dyspnea or wheezing     clonazePAM (KLONOPIN) 0.5 MG tablet Take 1 tablet (0.5 mg) by mouth as needed for anxiety     clonazePAM (KLONOPIN) 1 MG tablet Take 1 tablet (1 mg) by mouth At Bedtime     omeprazole (PRILOSEC) 20 MG DR capsule TAKE 1 CAPSULE (20 MG) BY MOUTH DAILY     QUEtiapine ER (SEROQUEL XR) 400 MG 24 hr tablet TAKE 1 TABLET BY MOUTH NIGHTLY AT BEDTIME     ramelteon (ROZEREM) 8 MG tablet Take 1 tablet (8 mg) by mouth At Bedtime     No current facility-administered medications for this visit.          PAST MEDICATION TRIALS    Prozac (fluoxetine), Zoloft (sertraline), Paxil (paroxetine) and Cymbalta (duloxetine). Paxil sexual SEs and constipation. Mirtazapine ineffective for insomnia.   Elavil (amitriptyline).   Lithium Carbonate, Depakote and Depakot ER (valproate) and Neurontin. Topamax didn't help (gabapentin). Lithium \"that was like eating pennies and nickels\" but helped / effective. Didn't like the blood level draws with lithium. Doesn't think has been on tegretol or trileptal.   Seroquel (quetiapine)., Abilify   no older antipsychotics.   Xanax (alprazolam). Doesn't think has been on Buspar   Ambien (zolpidem) and Lunesta (eszopiclone). Lunesta helped initial insomnia, NOT middle. Ambien made him violent and agitated.       VITALS   /72 (BP Location: Left arm, Patient Position: Sitting, Cuff Size: Adult Regular)   Pulse 80   Temp 97.3  F (36.3  C) (Tympanic)   Wt 95.3 kg (210 lb)   SpO2 91%   BMI 27.71 kg/m       PHQ9                     [unfilled]  [facility]  "                                                                                                                          Recent Labs   Lab Test  05/21/18   0856  11/22/17   0914   CHOL  203*  216*   HDL  24*  25*   LDL  142*  132*   TRIG  184*  295*     Lab Results   Component Value Date    TSH 1.27 05/21/2018     Last Basic Metabolic Panel:  Lab Results   Component Value Date     05/21/2018      Lab Results   Component Value Date    POTASSIUM 3.8 05/21/2018     Lab Results   Component Value Date    CHLORIDE 103 05/21/2018     Lab Results   Component Value Date    TOO 9.7 05/21/2018     Lab Results   Component Value Date    CO2 29 05/21/2018     Lab Results   Component Value Date    BUN 8 05/21/2018     Lab Results   Component Value Date    CR 1.09 05/21/2018     Lab Results   Component Value Date    GLC 81 05/21/2018          ms (on Seroquel) March 2017    MENTAL STATUS EXAM                                                                                        Alert. Oriented to person, place, and date / time. Casually groomed, calm, cooperative with good eye contact. No problems with speech. Psychomotor: unremarkable . Mood was irritable and affect was congruent to speech content and full range. Thought process, including associations, was unremarkable and thought content was devoid of suicidal and homicidal ideation and psychotic thought. No hallucinations. Insight was good. Judgment was intact and adequate for safety. Fund of knowledge was intact. Pt demonstrates no obvious problems with attention, concentration, language, recent or remote memory although these were not formally tested.     ASSESSMENT                                                                                                      HISTORICAL:  Initial psych note 9/14/16        NOTES:  Depakote in past didn't like it. Lithium in past metallic past but did work: doesn't want to go on it again. Trazodone: restless leg. Abilify:  restless / akathisia. Tegretol rash. Recalls gabapentin years ago ineffective. Rozerem not helping / ineffective  Jani Langford is a 40 yo male with history of bipolar disorder, PTSD and chem dep - on Seroquel total 700 mg and been on for awhile. Main issue is sleep : has not had luck with sleep meds so discussed I will certainly try but given he has not found past meds effective more unlikely we are going to find med that works. We tried Remeron with no luck. We tried Topamax and no luck. He saw Dr. Garcia  and started on Mirapex.We tried Belsomra and didn't help.    Our change of Seroquel to Abilify for Jani went well initially. Then he had akathisia really bad. He has been taking Seroquel 400 mg. I was thinking carbamazepine but he had rash in past. Thinking latuda but Jani deosn't want to take that given risk for akathisia (had bad akthisia wth Abilify). ? Saphris in future instead of the Seroquel.     Today we agreed on continue Seroquel 400 mg daily , Klonopin. I much appreciate sleep medicine seeing Jani. Going to try connect with sleep medicine. I'm not sure what next step is - he missed 5/13 apptmt with them. Actigraphy? Sleep study? Going to have him discontinue Rozerem as not helping anyway.    Jani signed controlled substance contract. He will submit UDS as part of this.        TREATMENT RISK STATEMENT:  The risks, benefits, alternatives and potential adverse effects have been explained and are understood by the pt.  The pt agrees to the treatment plan with the ability to do so.   The pt knows to call the clinic for any problems or access emergency care if needed.        DIAGNOSES                    PTSD   Bipolar I disorder with history of psychotic features      PLAN                                                                                                                    1)  MEDICATIONS:         -- Continue Seroquel 400 mg .    continue Klonopin 1 mg HS and 0.5 mg.     2)  THERAPY:  No  Change    3)  LABS:  lipid panel and glucose, LFTs, BMP including fasting glucose were done in May '17 and then in nov '17 as was glucose. EKG 3/2017.      4)  PT MONITOR [call for probs]:  Worsening symptoms, SI/HI, SEs from meds    5)  REFERRALS [CD, medical, other]: sleep referral : has apptmt in next couple weeks    6)  RTC: ~4 weeks

## 2019-05-24 NOTE — LETTER
[facility]  05/22/19    Patient: Jani Langford  YOB: 1980  Medical Record Number: [id number]  CSN: [id number]                                                                              Non-opioid Controlled Substance Agreement    I understand that my care provider has prescribed a controlled substance to help manage my condition(s). I am taking this medicine to help me function or work. I know this is strong medicine, and that it can cause serious side effects. Controlled substances can be sedating, addicting and may cause a dependency on the drug. They can affect my ability to drive or think, and cause depression. They need to be taken exactly as prescribed. Combining controlled substances with certain medicines or chemicals (such as cocaine, sedatives and tranquilizers, sleeping pills, meth) can be dangerous or even fatal. Also, if I stop controlled substances suddenly, I may have severe withdrawal symptoms.  If not helpful, I may be asked to stop them.    The risks, benefits, and side effects of these medicine(s) were explained to me. I agree that:    1. I will take part in other treatments as advised by my care team. This may be psychiatry or counseling, physical therapy, behavioral therapy, group treatment or a referral to a pain clinic. I will reduce or stop my medicine when my care team tells me to do so.  2. I will take my medicines as prescribed. I will not change the dose or schedule unless my care team tells me to. There will be no refills if I  run out early.   I may be contactedwithout warning and asked to complete a urine drug test or pill count at any time.   3. I will keep all my appointments, and understand this is part of the monitoring of controlled substances. My care team may require an office visit for EVERY controlled substance refill. If I miss appointments or don t follow instructions, my care team may stop my medicine.  4. I will not ask other providers to  prescribe controlled substances, and I will not accept controlled substances from other people. If I need another prescribed controlled substance for a new reason, I will tell my care team within 1 business day.  5. I will use one pharmacy to fill all of my controlled substance prescriptions, and it is up to me to make sure that I do not run out of my medicines on weekends or holidays. If my care team is willing to refill my controlled substance prescription without a visit, I must request refills only during office hours, refills may take up to 3 days to process, and it may take up to 5 to 7 days for my medicine to be mailed and ready at my pharmacy. Prescriptions will not be mailed anywhere except my pharmacy.    6. I am responsible for my prescriptions. If the medicine/prescription is lost or stolen, it will not be replaced. I also agree not to share controlled substance medicines with anyone.              [facility]  05/22/19  Patient:  Jani Langford  YOB: 1980  Medical Record Number: [id number]  CSN: [id number]    7. I agree to not use ANY illegal or recreational drugs. This includes marijuana, cocaine, bath salts or other drugs. I agree not to use alcohol unless my care team says I may. I agree to give urine samples whenever asked. If I don t give a urine sample, the care team may stop my medicine.    8. If I enroll in the [place] Medical Marijuana program, I will tell my care team. I will also sign an agreement to share my medical records with my care team.    9. I will bring in my list of medicines (or my medicine bottles) each time I come to the clinic.   10. I will tell my care team right away if I become pregnant or have a new medical problem treated outside of my regular clinic.  11. I understand that this medicine can affect my thinking and judgment. It may be unsafe for me to drive, use machinery and do dangerous tasks. I will not do any of these things until I know how the  medicine affects me. If my dose changes, I will wait to see how it affects me. I will contact my care team if I have concerns about medicine side effects.    I understand that if I do not follow any of the conditions above, my prescriptions or treatment may be stopped.      I agree that my provider, clinic care team, and pharmacy may work with any city, state or federal law enforcement agency that investigates the misuse, sale, or other diversion of my controlled medicine. I will allow my provider to discuss my care with or share a copy of this agreement with any other treating provider, pharmacy or emergency room where I receive care. I agree to give up (waive) any right of privacy or confidentiality with respect to these consents.   I have read this agreement and have asked questions about anything I did not understand.    ____________________________________________________    ____________  ________  Patient signature                                                         Date      Time    ____________________________________________________     ____________  ________  Witness                                                          Date      Time    ____________________________________________________  Provider signature

## 2019-05-24 NOTE — LETTER
[facility]  05/22/19    Patient: Jani Langford  YOB: 1980  Medical Record Number: [id number]  CSN: [id number]                                                                              Non-opioid Controlled Substance Agreement    I understand that my care provider has prescribed a controlled substance to help manage my condition(s). I am taking this medicine to help me function or work. I know this is strong medicine, and that it can cause serious side effects. Controlled substances can be sedating, addicting and may cause a dependency on the drug. They can affect my ability to drive or think, and cause depression. They need to be taken exactly as prescribed. Combining controlled substances with certain medicines or chemicals (such as cocaine, sedatives and tranquilizers, sleeping pills, meth) can be dangerous or even fatal. Also, if I stop controlled substances suddenly, I may have severe withdrawal symptoms.  If not helpful, I may be asked to stop them.    The risks, benefits, and side effects of these medicine(s) were explained to me. I agree that:    1. I will take part in other treatments as advised by my care team. This may be psychiatry or counseling, physical therapy, behavioral therapy, group treatment or a referral to a pain clinic. I will reduce or stop my medicine when my care team tells me to do so.  2. I will take my medicines as prescribed. I will not change the dose or schedule unless my care team tells me to. There will be no refills if I  run out early.   I may be contactedwithout warning and asked to complete a urine drug test or pill count at any time.   3. I will keep all my appointments, and understand this is part of the monitoring of controlled substances. My care team may require an office visit for EVERY controlled substance refill. If I miss appointments or don t follow instructions, my care team may stop my medicine.  4. I will not ask other providers to  prescribe controlled substances, and I will not accept controlled substances from other people. If I need another prescribed controlled substance for a new reason, I will tell my care team within 1 business day.  5. I will use one pharmacy to fill all of my controlled substance prescriptions, and it is up to me to make sure that I do not run out of my medicines on weekends or holidays. If my care team is willing to refill my controlled substance prescription without a visit, I must request refills only during office hours, refills may take up to 3 days to process, and it may take up to 5 to 7 days for my medicine to be mailed and ready at my pharmacy. Prescriptions will not be mailed anywhere except my pharmacy.    6. I am responsible for my prescriptions. If the medicine/prescription is lost or stolen, it will not be replaced. I also agree not to share controlled substance medicines with anyone.              [facility]  05/22/19  Patient:  Jani Langford  YOB: 1980  Medical Record Number: [id number]  CSN: [id number]    7. I agree to not use ANY illegal or recreational drugs. This includes marijuana, cocaine, bath salts or other drugs. I agree not to use alcohol unless my care team says I may. I agree to give urine samples whenever asked. If I don t give a urine sample, the care team may stop my medicine.    8. If I enroll in the [place] Medical Marijuana program, I will tell my care team. I will also sign an agreement to share my medical records with my care team.    9. I will bring in my list of medicines (or my medicine bottles) each time I come to the clinic.   10. I will tell my care team right away if I become pregnant or have a new medical problem treated outside of my regular clinic.  11. I understand that this medicine can affect my thinking and judgment. It may be unsafe for me to drive, use machinery and do dangerous tasks. I will not do any of these things until I know how the  medicine affects me. If my dose changes, I will wait to see how it affects me. I will contact my care team if I have concerns about medicine side effects.    I understand that if I do not follow any of the conditions above, my prescriptions or treatment may be stopped.      I agree that my provider, clinic care team, and pharmacy may work with any city, state or federal law enforcement agency that investigates the misuse, sale, or other diversion of my controlled medicine. I will allow my provider to discuss my care with or share a copy of this agreement with any other treating provider, pharmacy or emergency room where I receive care. I agree to give up (waive) any right of privacy or confidentiality with respect to these consents.   I have read this agreement and have asked questions about anything I did not understand.    ____________________________________________________    ____________  ________  Patient signature                                                         Date      Time    ____________________________________________________     ____________  ________  Witness                                                          Date      Time    ____________________________________________________  Provider signature

## 2019-05-25 ASSESSMENT — ANXIETY QUESTIONNAIRES: GAD7 TOTAL SCORE: 11

## 2019-05-28 PROBLEM — F41.9 ANXIETY: Status: ACTIVE | Noted: 2019-05-28

## 2019-06-04 NOTE — PROGRESS NOTES
SUBJECTIVE:   Jani Langford is a 39 year old male who presents to clinic today for the following   health issues:      RESPIRATORY SYMPTOMS      Duration: 5 days    Description  nasal congestion, rhinorrhea, cough, fever, chills, headache, myalgias, nausea and vomiting    Severity: moderate    Accompanying signs and symptoms: having a very dry cough and losing weight.    History (predisposing factors):  tobacco abuse    Precipitating or alleviating factors: None    Therapies tried and outcome:  Various cough and cold pills, benadryl and sudafed.          Additional history: as documented    Reviewed  and updated as needed this visit by clinical staff         Reviewed and updated as needed this visit by Provider         Patient Active Problem List   Diagnosis     Bipolar disorder with psychotic features (H)     Insomnia     History of mental disorder     Attention deficit disorder     Posttraumatic stress disorder     Decreased sex drive     Gastroesophageal reflux disease without esophagitis     Anxiety     Past Surgical History:   Procedure Laterality Date     DENTAL SURGERY      pulled 2 teeth month ago     ENT SURGERY      sinus       Social History     Tobacco Use     Smoking status: Former Smoker     Packs/day: 0.75     Years: 24.00     Pack years: 18.00     Types: Cigarettes, Other     Start date: 1995     Last attempt to quit: 3/12/2019     Years since quittin.2     Smokeless tobacco: Never Used     Tobacco comment: vaping sometimes- noted 19    Substance Use Topics     Alcohol use: Yes     Comment: rare     Family History   Problem Relation Age of Onset     Mental Illness Mother      Depression Mother          Current Outpatient Medications   Medication Sig Dispense Refill     albuterol (PROAIR HFA/PROVENTIL HFA/VENTOLIN HFA) 108 (90 Base) MCG/ACT inhaler Inhale 2 puffs into the lungs every 6 hours as needed for shortness of breath / dyspnea or wheezing 1 Inhaler 1     clonazePAM (KLONOPIN)  0.5 MG tablet Take 1 tablet (0.5 mg) by mouth as needed for anxiety 30 tablet 3     clonazePAM (KLONOPIN) 1 MG tablet Take 1 tablet (1 mg) by mouth At Bedtime 30 tablet 2     omeprazole (PRILOSEC) 20 MG DR capsule TAKE 1 CAPSULE (20 MG) BY MOUTH DAILY 30 capsule 2     QUEtiapine ER (SEROQUEL XR) 400 MG 24 hr tablet TAKE 1 TABLET BY MOUTH NIGHTLY AT BEDTIME 30 tablet 6     Allergies   Allergen Reactions     Abilify [Aripiprazole]      Side effects     Bee Venom      Latex Hives     Percocet [Oxycodone-Acetaminophen]      Short of breath vomiting     Toradol      seizure     Tramadol      Short of breath and vomiting     Tegretol [Carbamazepine] Rash       ROS:  Constitutional, HEENT, cardiovascular, pulmonary, gi and gu systems are negative, except as otherwise noted.    OBJECTIVE:                                                    /72   Pulse 101   Temp 99.4  F (37.4  C) (Tympanic)   Wt 90.7 kg (200 lb)   SpO2 95%   BMI 26.39 kg/m    Body mass index is 26.39 kg/m .  General: Alert. Oriented. In no acute distress  Skin: No suspicious rashes or lesions.  HEENT: EAC's and TM's intact. Posterior pharynx moist and pink without edema or exudate.erythema. Neck is supple. No anterior chain  lymphadenopathy palpable.   Resp: Scattered rhonchi and wheeze. No rales  Cardiac: RRR. Normal S1, S2. No murmur.  Psych: Mood euthymic with corresponding affect.             ASSESSMENT/PLAN:                                                    (J40) Bronchitis  (primary encounter diagnosis)  Comment: prednisone 40 mg daily for 2 days, then 20 mg daily for 3 days  Plan: azithromycin (ZITHROMAX) 250 MG tablet,         predniSONE (DELTASONE) 20 MG tablet            (R05) Cough  Comment: CXR clear. Elevated WBC and neut.  Plan: CBC with platelets and differential, XR CHEST 2        VW (Clinic Performed)                Rest, increase fluids, Tylenol for fever or discomfort. Return to clinic if symptoms persist or  worsen.      LOW Montelongo  [facility]

## 2019-06-05 ENCOUNTER — OFFICE VISIT (OUTPATIENT)
Dept: FAMILY MEDICINE | Facility: OTHER | Age: 39
End: 2019-06-05
Attending: PHYSICIAN ASSISTANT
Payer: COMMERCIAL

## 2019-06-05 ENCOUNTER — ANCILLARY PROCEDURE (OUTPATIENT)
Dept: GENERAL RADIOLOGY | Facility: OTHER | Age: 39
End: 2019-06-05
Attending: PHYSICIAN ASSISTANT
Payer: COMMERCIAL

## 2019-06-05 VITALS
DIASTOLIC BLOOD PRESSURE: 72 MMHG | WEIGHT: 200 LBS | OXYGEN SATURATION: 95 % | HEART RATE: 101 BPM | SYSTOLIC BLOOD PRESSURE: 130 MMHG | BODY MASS INDEX: 26.39 KG/M2 | TEMPERATURE: 99.4 F

## 2019-06-05 DIAGNOSIS — F31.9 BIPOLAR I DISORDER (H): ICD-10-CM

## 2019-06-05 DIAGNOSIS — G47.00 PERSISTENT INSOMNIA: Primary | ICD-10-CM

## 2019-06-05 DIAGNOSIS — R05.9 COUGH: ICD-10-CM

## 2019-06-05 DIAGNOSIS — J40 BRONCHITIS: Primary | ICD-10-CM

## 2019-06-05 LAB
BASOPHILS # BLD AUTO: 0 10E9/L (ref 0–0.2)
BASOPHILS NFR BLD AUTO: 0.1 %
DIFFERENTIAL METHOD BLD: ABNORMAL
EOSINOPHIL # BLD AUTO: 0.1 10E9/L (ref 0–0.7)
EOSINOPHIL NFR BLD AUTO: 0.7 %
ERYTHROCYTE [DISTWIDTH] IN BLOOD BY AUTOMATED COUNT: 12.3 % (ref 10–15)
HCT VFR BLD AUTO: 44.1 % (ref 40–53)
HGB BLD-MCNC: 15.9 G/DL (ref 13.3–17.7)
LYMPHOCYTES # BLD AUTO: 2.1 10E9/L (ref 0.8–5.3)
LYMPHOCYTES NFR BLD AUTO: 12.8 %
MCH RBC QN AUTO: 32.7 PG (ref 26.5–33)
MCHC RBC AUTO-ENTMCNC: 36.1 G/DL (ref 31.5–36.5)
MCV RBC AUTO: 91 FL (ref 78–100)
MONOCYTES # BLD AUTO: 1.2 10E9/L (ref 0–1.3)
MONOCYTES NFR BLD AUTO: 7.3 %
NEUTROPHILS # BLD AUTO: 13.2 10E9/L (ref 1.6–8.3)
NEUTROPHILS NFR BLD AUTO: 79.1 %
PLATELET # BLD AUTO: 257 10E9/L (ref 150–450)
PLATELET # BLD EST: ABNORMAL 10*3/UL
RBC # BLD AUTO: 4.86 10E12/L (ref 4.4–5.9)
RBC MORPH BLD: NORMAL
WBC # BLD AUTO: 16.6 10E9/L (ref 4–11)

## 2019-06-05 PROCEDURE — 99213 OFFICE O/P EST LOW 20 MIN: CPT | Performed by: PHYSICIAN ASSISTANT

## 2019-06-05 PROCEDURE — 85025 COMPLETE CBC W/AUTO DIFF WBC: CPT | Mod: ZL | Performed by: PHYSICIAN ASSISTANT

## 2019-06-05 PROCEDURE — G0463 HOSPITAL OUTPT CLINIC VISIT: HCPCS

## 2019-06-05 PROCEDURE — 71046 X-RAY EXAM CHEST 2 VIEWS: CPT | Mod: TC,FY

## 2019-06-05 PROCEDURE — 36415 COLL VENOUS BLD VENIPUNCTURE: CPT | Mod: ZL | Performed by: PHYSICIAN ASSISTANT

## 2019-06-05 RX ORDER — CLONAZEPAM 1 MG/1
1 TABLET ORAL AT BEDTIME
Qty: 30 TABLET | Refills: 2 | Status: CANCELLED | OUTPATIENT
Start: 2019-06-05

## 2019-06-05 RX ORDER — AZITHROMYCIN 250 MG/1
TABLET, FILM COATED ORAL
Qty: 6 TABLET | Refills: 0 | Status: SHIPPED | OUTPATIENT
Start: 2019-06-05 | End: 2019-06-21

## 2019-06-05 RX ORDER — PREDNISONE 20 MG/1
TABLET ORAL
Qty: 7 TABLET | Refills: 0 | Status: SHIPPED | OUTPATIENT
Start: 2019-06-05 | End: 2019-06-21

## 2019-06-05 ASSESSMENT — PAIN SCALES - GENERAL: PAINLEVEL: SEVERE PAIN (7)

## 2019-06-05 NOTE — NURSING NOTE
"Chief Complaint   Patient presents with     URI       Initial /72   Pulse 101   Temp 99.4  F (37.4  C) (Tympanic)   Wt 90.7 kg (200 lb)   SpO2 95%   BMI 26.39 kg/m   Estimated body mass index is 26.39 kg/m  as calculated from the following:    Height as of 4/23/19: 1.854 m (6' 1\").    Weight as of this encounter: 90.7 kg (200 lb).  Medication Reconciliation: complete    Radha Long MA    "

## 2019-06-05 NOTE — TELEPHONE ENCOUNTER
Klonopin       Last Written Prescription Date:  3/8/2019  Last Fill Quantity: 30,   # refills: 2  Last Office Visit: 5/24/2019  Future Office visit:    Next 5 appointments (look out 90 days)    Jun 21, 2019  8:20 AM CDT  (Arrive by 8:05 AM)  Return Visit with Renee Vuong MD  [facility] [address] [place] [id number]  [phone]           Routing refill request to provider for review/approval because:  Drug not on the [facility], [facility] or [facility] refill protocol or controlled substance

## 2019-06-05 NOTE — LETTER
My Depression Action Plan  Name: Jani Langford   Date of Birth 1980  Date: 6/4/2019    My doctor: Selwyn Lopez   My clinic: [address]  [facility] [place] [address]  [phone]          GREEN    ZONE   Good Control    What it looks like:     Things are going generally well. You have normal up s and down s. You may even feel depressed from time to time, but bad moods usually last less than a day.   What you need to do:  1. Continue to care for yourself (see self care plan)  2. Check your depression survival kit and update it as needed  3. Follow your physician s recommendations including any medication.  4. Do not stop taking medication unless you consult with your physician first.           YELLOW         ZONE Getting Worse    What it looks like:     Depression is starting to interfere with your life.     It may be hard to get out of bed; you may be starting to isolate yourself from others.    Symptoms of depression are starting to last most all day and this has happened for several days.     You may have suicidal thoughts but they are not constant.   What you need to do:     1. Call your care team, your response to treatment will improve if you keep your care team informed of your progress. Yellow periods are signs an adjustment may need to be made.     2. Continue your self-care, even if you have to fake it!    3. Talk to someone in your support network    4. Open up your depression survival kit           RED    ZONE Medical Alert - Get Help    What it looks like:     Depression is seriously interfering with your life.     You may experience these or other symptoms: You can t get out of bed most days, can t work or engage in other necessary activities, you have trouble taking care of basic hygiene, or basic responsibilities, thoughts of suicide or death that will not go away, self-injurious behavior.     What you need to do:  1. Call your care team and  request a same-day appointment. If they are not available (weekends or after hours) call your local crisis line, emergency room or 911.            Depression Self Care Plan / Survival Kit    Self-Care for Depression  Here s the deal. Your body and mind are really not as separate as most people think.  What you do and think affects how you feel and how you feel influences what you do and think. This means if you do things that people who feel good do, it will help you feel better.  Sometimes this is all it takes.  There is also a place for medication and therapy depending on how severe your depression is, so be sure to consult with your medical provider and/ or Behavioral Health Consultant if your symptoms are worsening or not improving.     In order to better manage my stress, I will:    Exercise  Get some form of exercise, every day. This will help reduce pain and release endorphins, the  feel good  chemicals in your brain. This is almost as good as taking antidepressants!  This is not the same as joining a gym and then never going! (they count on that by the way ) It can be as simple as just going for a walk or doing some gardening, anything that will get you moving.      Hygiene   Maintain good hygiene (Get out of bed in the morning, Make your bed, Brush your teeth, Take a shower, and Get dressed like you were going to work, even if you are unemployed).  If your clothes don't fit try to get ones that do.    Diet  I will strive to eat foods that are good for me, drink plenty of water, and avoid excessive sugar, caffeine, alcohol, and other mood-altering substances.  Some foods that are helpful in depression are: complex carbohydrates, B vitamins, flaxseed, fish or fish oil, fresh fruits and vegetables.    Psychotherapy  I agree to participate in Individual Therapy (if recommended).    Medication  If prescribed medications, I agree to take them.  Missing doses can result in serious side effects.  I understand that  drinking alcohol, or other illicit drug use, may cause potential side effects.  I will not stop my medication abruptly without first discussing it with my provider.    Staying Connected With Others  I will stay in touch with my friends, family members, and my primary care provider/team.    Use your imagination  Be creative.  We all have a creative side; it doesn t matter if it s oil painting, sand castles, or mud pies! This will also kick up the endorphins.    Witness Beauty  (AKA stop and smell the roses) Take a look outside, even in mid-winter. Notice colors, textures. Watch the squirrels and birds.     Service to others  Be of service to others.  There is always someone else in need.  By helping others we can  get out of ourselves  and remember the really important things.  This also provides opportunities for practicing all the other parts of the program.    Humor  Laugh and be silly!  Adjust your TV habits for less news and crime-drama and more comedy.    Control your stress  Try breathing deep, massage therapy, biofeedback, and meditation. Find time to relax each day.     My support system    Clinic Contact:  Phone number:    Contact 1:  Phone number:    Contact 2:  Phone number:    [place]/:  Phone number:    Therapist:  Phone number:    Local crisis center:    Phone number:    Other community support:  Phone number:

## 2019-06-06 NOTE — TELEPHONE ENCOUNTER
reviewed: last fill 5.2.19    Patient has appointment 6/21/19 and UDS ordered since this wasn't completed last visit.     Thank you

## 2019-06-07 RX ORDER — ZALEPLON 5 MG/1
5 CAPSULE ORAL AT BEDTIME
Qty: 30 CAPSULE | Refills: 1 | Status: SHIPPED | OUTPATIENT
Start: 2019-06-07 | End: 2019-06-21 | Stop reason: DRUGHIGH

## 2019-06-07 NOTE — TELEPHONE ENCOUNTER
Okay plan is in consulting with sleep medicine I'm going to reduce the Klonopin he takes HS to 0.5 mg (was 1 mg) and start Sonata 5 mg HS.     They are of similar class but I feel safe with combining providing I decrease the Klonopin to 0.5 mg

## 2019-06-07 NOTE — TELEPHONE ENCOUNTER
2:11 PM    Called patient to update on medication changes.  Patient verbalized understanding.  Patient was given contact information if he has any questions or concerns.  Patient has future appointment in 2 weeks.      Virginia Bonilla RN-BSN  Care Coordination, Behavioral Health      Next 5 appointments (look out 90 days)    Jun 21, 2019  8:20 AM CDT  (Arrive by 8:05 AM)  [facility] [address] [place] [id number] [phone]

## 2019-06-14 DIAGNOSIS — F41.1 GAD (GENERALIZED ANXIETY DISORDER): ICD-10-CM

## 2019-06-14 RX ORDER — CLONAZEPAM 0.5 MG/1
0.5 TABLET ORAL PRN
Qty: 30 TABLET | Refills: 3 | Status: CANCELLED | OUTPATIENT
Start: 2019-06-14

## 2019-06-14 NOTE — TELEPHONE ENCOUNTER
Clonazapam      Last Written Prescription Date:  5-2-2019  Last Fill Quantity: 30,   # refills: 2  Last Office Visit: 6-5-2019  Future Office visit:    Next 5 appointments (look out 90 days)    Jun 21, 2019  8:20 AM CDT  (Arrive by 8:05 AM)  Return Visit with Renee Vuong MD  [facility] [address] [place] [id number] [phone]           Routing refill request to provider for review/approval because:  Drug not on the [facility], [facility] or [facility] refill protocol or controlled substance

## 2019-06-17 NOTE — TELEPHONE ENCOUNTER
Too soon? Reviewed , last filled 6/4/19 #30. Looks like pt has also been getting clonazepam 1mg. Please advise. Thank you!

## 2019-06-17 NOTE — TELEPHONE ENCOUNTER
Klonopin 1 mg -  reviewed.  Wasn't on current med list but last office visit does state patient is to continue Klonopin 1 mg at HS and 0.5 mg PRN.    Please advise.  Thank you!    Klonopin 0.5 mg refused. Filled 6/4/19.

## 2019-06-18 RX ORDER — CLONAZEPAM 1 MG/1
1 TABLET ORAL AT BEDTIME
Qty: 30 TABLET | Refills: 0 | OUTPATIENT
Start: 2019-06-18

## 2019-06-21 ENCOUNTER — OFFICE VISIT (OUTPATIENT)
Dept: PSYCHIATRY | Facility: OTHER | Age: 39
End: 2019-06-21
Attending: PSYCHIATRY & NEUROLOGY
Payer: COMMERCIAL

## 2019-06-21 VITALS
BODY MASS INDEX: 26.39 KG/M2 | SYSTOLIC BLOOD PRESSURE: 114 MMHG | DIASTOLIC BLOOD PRESSURE: 70 MMHG | HEART RATE: 70 BPM | OXYGEN SATURATION: 95 % | WEIGHT: 200 LBS | TEMPERATURE: 97 F

## 2019-06-21 DIAGNOSIS — Z79.899 ENCOUNTER FOR LONG-TERM (CURRENT) USE OF MEDICATIONS: Primary | ICD-10-CM

## 2019-06-21 DIAGNOSIS — F31.9 BIPOLAR I DISORDER (H): ICD-10-CM

## 2019-06-21 PROCEDURE — 99000 SPECIMEN HANDLING OFFICE-LAB: CPT | Performed by: PSYCHIATRY & NEUROLOGY

## 2019-06-21 PROCEDURE — 99214 OFFICE O/P EST MOD 30 MIN: CPT | Performed by: PSYCHIATRY & NEUROLOGY

## 2019-06-21 PROCEDURE — 80307 DRUG TEST PRSMV CHEM ANLYZR: CPT | Mod: ZL | Performed by: PSYCHIATRY & NEUROLOGY

## 2019-06-21 PROCEDURE — G0463 HOSPITAL OUTPT CLINIC VISIT: HCPCS

## 2019-06-21 RX ORDER — CLONAZEPAM 1 MG/1
1 TABLET ORAL AT BEDTIME
Qty: 30 TABLET | Refills: 1 | Status: SHIPPED | OUTPATIENT
Start: 2019-06-21 | End: 2019-08-22

## 2019-06-21 ASSESSMENT — ANXIETY QUESTIONNAIRES
5. BEING SO RESTLESS THAT IT IS HARD TO SIT STILL: SEVERAL DAYS
6. BECOMING EASILY ANNOYED OR IRRITABLE: MORE THAN HALF THE DAYS
3. WORRYING TOO MUCH ABOUT DIFFERENT THINGS: MORE THAN HALF THE DAYS
2. NOT BEING ABLE TO STOP OR CONTROL WORRYING: MORE THAN HALF THE DAYS
7. FEELING AFRAID AS IF SOMETHING AWFUL MIGHT HAPPEN: NOT AT ALL
IF YOU CHECKED OFF ANY PROBLEMS ON THIS QUESTIONNAIRE, HOW DIFFICULT HAVE THESE PROBLEMS MADE IT FOR YOU TO DO YOUR WORK, TAKE CARE OF THINGS AT HOME, OR GET ALONG WITH OTHER PEOPLE: VERY DIFFICULT
1. FEELING NERVOUS, ANXIOUS, OR ON EDGE: MORE THAN HALF THE DAYS
GAD7 TOTAL SCORE: 12

## 2019-06-21 ASSESSMENT — PATIENT HEALTH QUESTIONNAIRE - PHQ9
5. POOR APPETITE OR OVEREATING: NEARLY EVERY DAY
SUM OF ALL RESPONSES TO PHQ QUESTIONS 1-9: 10

## 2019-06-21 ASSESSMENT — PAIN SCALES - GENERAL: PAINLEVEL: NO PAIN (0)

## 2019-06-21 NOTE — PROGRESS NOTES
PSYCHIATRY CLINIC PROGRESS NOTE   20 minute medication management, more than 50% of time spent counseling patient on medications, medication side effects, symptom history and management   SUBJECTIVE / INTERIM HISTORY                                                                        Social- with ISABEL Sandhu and her kids Children- Phoebe's kids are 5, 7 , and 12 yo. Ozzy is the 13 yo Wiener dog  Last visit May '19: Continue Seroquel 400 mg .    continue Klonopin 1 mg HS and 0.5 mg.     - frustrated. Notes went to  Klonopin 1 mg, not there. Reminds me he had violent mood / behavior when took Ambien. Hence he did NOT take Sonata of which noted I'm in agreement with.  - Anxiety up / irritability. Taking Klonopin 0.5 mg evening and been taking 0.5 mg am. Prefers go back to Klonopin 1 mg night.   -    -    - klonopin helps with anxiety more than sleep.   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  - aggressive in past while manic. Hasn't had any major ordeals but has been agitated, snappy, irritability.   - depression, irritability    SUBSTANCE USE- smokes 1/2 PPD since age 15 yo.  past use  Meth: ~2 + years clean. , MJ. [place]: [place] and [employer]. EtOH now rare.    SYMPTOMS-  low energy, low mood. Insomnia still.  Passive SI: no intent or plan. Paranoia has improved. No manic sx.  nightmares, flashbacks, detached, angry outbursts, self-destructive, startle response, hypervigilance and fear Sleep now: about 2-3 hours then up for awhile, then another hour. His goal would be 6 continuous.  MEDICAL ROS- appetite increased (in relation to Seroquel)  SOB: usually at night. But sometimes during day as well not even with exertion.  increased appetite / weight  MEDICAL / SURGICAL HISTORY                   Patient Active Problem List   Diagnosis     Bipolar disorder with psychotic features (H)     Insomnia     History of mental disorder     Attention deficit disorder     Posttraumatic stress  "disorder     Decreased sex drive     Gastroesophageal reflux disease without esophagitis     Anxiety     ALLERGY   Abilify [aripiprazole]; Bee venom; Latex; Percocet [oxycodone-acetaminophen]; Toradol; Tramadol; and Tegretol [carbamazepine]  MEDICATIONS                                                                                             Current Outpatient Medications   Medication Sig     albuterol (PROAIR HFA/PROVENTIL HFA/VENTOLIN HFA) 108 (90 Base) MCG/ACT inhaler Inhale 2 puffs into the lungs every 6 hours as needed for shortness of breath / dyspnea or wheezing     clonazePAM (KLONOPIN) 0.5 MG tablet Take 1 tablet (0.5 mg) by mouth as needed for anxiety     omeprazole (PRILOSEC) 20 MG DR capsule TAKE 1 CAPSULE (20 MG) BY MOUTH DAILY     QUEtiapine ER (SEROQUEL XR) 400 MG 24 hr tablet TAKE 1 TABLET BY MOUTH NIGHTLY AT BEDTIME     zaleplon (SONATA) 5 MG capsule Take 1 capsule (5 mg) by mouth At Bedtime     No current facility-administered medications for this visit.          PAST MEDICATION TRIALS    Prozac (fluoxetine), Zoloft (sertraline), Paxil (paroxetine) and Cymbalta (duloxetine). Paxil sexual SEs and constipation. Mirtazapine ineffective for insomnia.   Elavil (amitriptyline).   Lithium Carbonate, Depakote and Depakot ER (valproate) and Neurontin. Topamax didn't help (gabapentin). Lithium \"that was like eating pennies and nickels\" but helped / effective. Didn't like the blood level draws with lithium. Doesn't think has been on tegretol or trileptal.   Seroquel (quetiapine)., Abilify   no older antipsychotics.   Xanax (alprazolam). Doesn't think has been on Buspar   Ambien (zolpidem) and Lunesta (eszopiclone). Lunesta helped initial insomnia, NOT middle. Ambien made him violent and agitated.       VITALS   /70 (BP Location: Left arm, Patient Position: Sitting, Cuff Size: Adult Regular)   Pulse 70   Temp 97  F (36.1  C) (Tympanic)   Wt 90.7 kg (200 lb)   SpO2 95%   BMI 26.39 kg/m       PHQ9   "                   [unfilled]  LABS                                                                                                                           Recent Labs   Lab Test  05/21/18   0856  11/22/17   0914   CHOL  203*  216*   HDL  24*  25*   LDL  142*  132*   TRIG  184*  295*     Lab Results   Component Value Date    TSH 1.27 05/21/2018     Last Basic Metabolic Panel:  Lab Results   Component Value Date     05/21/2018      Lab Results   Component Value Date    POTASSIUM 3.8 05/21/2018     Lab Results   Component Value Date    CHLORIDE 103 05/21/2018     Lab Results   Component Value Date    TOO 9.7 05/21/2018     Lab Results   Component Value Date    CO2 29 05/21/2018     Lab Results   Component Value Date    BUN 8 05/21/2018     Lab Results   Component Value Date    CR 1.09 05/21/2018     Lab Results   Component Value Date    GLC 81 05/21/2018          ms (on Seroquel) March 2017    MENTAL STATUS EXAM                                                                                        Alert. Oriented to person, place, and date / time. Casually groomed, calm, cooperative with good eye contact. No problems with speech. Psychomotor: unremarkable . Mood was irritable and affect was congruent to speech content and full range. Thought process, including associations, was unremarkable and thought content was devoid of suicidal and homicidal ideation and psychotic thought. No hallucinations. Insight was good. Judgment was intact and adequate for safety. Fund of knowledge was intact. Pt demonstrates no obvious problems with attention, concentration, language, recent or remote memory although these were not formally tested.     ASSESSMENT                                                                                                      HISTORICAL:  Initial psych note 9/14/16        NOTES:  Depakote in past didn't like it. Lithium in past metallic past but did work: doesn't want to go on it  again. Trazodone: restless leg. Abilify: restless / akathisia. Tegretol rash. Recalls gabapentin years ago ineffective. Rozerem not helping / ineffective. Ambien he had bad / violent reaction  Jani Langford is a 38 yo male with history of bipolar disorder, PTSD and chem dep - on Seroquel total 700 mg and been on for awhile. Main issue is sleep : has not had luck with sleep meds so discussed I will certainly try but given he has not found past meds effective more unlikely we are going to find med that works. We tried Remeron with no luck. We tried Topamax and no luck. He saw Dr. Garcia  and started on Mirapex.We tried Belsomra and didn't help.    Our change of Seroquel to Abilify for Jani went well initially. Then he had akathisia really bad. He has been taking Seroquel 400 mg. I was thinking carbamazepine but he had rash in past. Thinking latuda but Jani deosn't want to take that given risk for akathisia (had bad akthisia wth Abilify). ? Saphris in future instead of the Seroquel.     Today we agreed on continue Seroquel 400 mg daily , and switch the Klonopin 0.5 mg HS back to 1 mg HS    Jani signed controlled substance contract last weekend.        TREATMENT RISK STATEMENT:  The risks, benefits, alternatives and potential adverse effects have been explained and are understood by the pt.  The pt agrees to the treatment plan with the ability to do so.   The pt knows to call the clinic for any problems or access emergency care if needed.        DIAGNOSES                    PTSD   Bipolar I disorder with history of psychotic features      PLAN                                                                                                                    1)  MEDICATIONS:         -- Continue Seroquel 400 mg .    continue Klonopin 0.5 mg as prn and we will get rid of Sonata (he isn't taking) and we will add Klonopin 1 mg HS    2)  THERAPY:  No Change    3)  LABS:  lipid panel and glucose, LFTs, BMP including fasting  glucose were done in May '17 and then in nov '17 as was glucose. EKG 3/2017.      4)  PT MONITOR [call for probs]:  Worsening symptoms, SI/HI, SEs from meds    5)  REFERRALS [CD, medical, other]: sleep referral : has apptmt in next couple weeks    6)  RTC: ~4 weeks

## 2019-06-21 NOTE — NURSING NOTE
"Chief Complaint   Patient presents with     RECHECK     mental health.  Sleep issues.       Initial /70 (BP Location: Left arm, Patient Position: Sitting, Cuff Size: Adult Regular)   Pulse 70   Temp 97  F (36.1  C) (Tympanic)   Wt 90.7 kg (200 lb)   SpO2 95%   BMI 26.39 kg/m   Estimated body mass index is 26.39 kg/m  as calculated from the following:    Height as of 4/23/19: 1.854 m (6' 1\").    Weight as of this encounter: 90.7 kg (200 lb).  Medication Reconciliation: complete        "

## 2019-06-22 ASSESSMENT — ANXIETY QUESTIONNAIRES: GAD7 TOTAL SCORE: 12

## 2019-06-28 LAB — PAIN DRUG SCR UR W RPTD MEDS: NORMAL

## 2019-07-29 ENCOUNTER — OFFICE VISIT (OUTPATIENT)
Dept: PSYCHIATRY | Facility: OTHER | Age: 39
End: 2019-07-29
Attending: PSYCHIATRY & NEUROLOGY
Payer: COMMERCIAL

## 2019-07-29 VITALS
BODY MASS INDEX: 28.49 KG/M2 | OXYGEN SATURATION: 95 % | WEIGHT: 215 LBS | SYSTOLIC BLOOD PRESSURE: 118 MMHG | DIASTOLIC BLOOD PRESSURE: 76 MMHG | HEIGHT: 73 IN | HEART RATE: 81 BPM | TEMPERATURE: 97.2 F

## 2019-07-29 DIAGNOSIS — F31.9 BIPOLAR I DISORDER (H): Primary | ICD-10-CM

## 2019-07-29 PROCEDURE — 99213 OFFICE O/P EST LOW 20 MIN: CPT | Performed by: PSYCHIATRY & NEUROLOGY

## 2019-07-29 PROCEDURE — G0463 HOSPITAL OUTPT CLINIC VISIT: HCPCS

## 2019-07-29 ASSESSMENT — ANXIETY QUESTIONNAIRES
2. NOT BEING ABLE TO STOP OR CONTROL WORRYING: SEVERAL DAYS
1. FEELING NERVOUS, ANXIOUS, OR ON EDGE: SEVERAL DAYS
6. BECOMING EASILY ANNOYED OR IRRITABLE: SEVERAL DAYS
7. FEELING AFRAID AS IF SOMETHING AWFUL MIGHT HAPPEN: NOT AT ALL
5. BEING SO RESTLESS THAT IT IS HARD TO SIT STILL: SEVERAL DAYS
IF YOU CHECKED OFF ANY PROBLEMS ON THIS QUESTIONNAIRE, HOW DIFFICULT HAVE THESE PROBLEMS MADE IT FOR YOU TO DO YOUR WORK, TAKE CARE OF THINGS AT HOME, OR GET ALONG WITH OTHER PEOPLE: VERY DIFFICULT
GAD7 TOTAL SCORE: 9
3. WORRYING TOO MUCH ABOUT DIFFERENT THINGS: MORE THAN HALF THE DAYS

## 2019-07-29 ASSESSMENT — PAIN SCALES - GENERAL: PAINLEVEL: NO PAIN (0)

## 2019-07-29 ASSESSMENT — PATIENT HEALTH QUESTIONNAIRE - PHQ9
5. POOR APPETITE OR OVEREATING: NEARLY EVERY DAY
SUM OF ALL RESPONSES TO PHQ QUESTIONS 1-9: 12

## 2019-07-29 ASSESSMENT — MIFFLIN-ST. JEOR: SCORE: 1954.11

## 2019-07-29 NOTE — NURSING NOTE
"Chief Complaint   Patient presents with     RECHECK     Mental health.  Patient reports symptoms have improved.  No racing thoughts.  C/o sleep issues, wakes up more than usual.  Needs refill of Klonopin 1 mg.       Initial /76 (BP Location: Left arm, Patient Position: Sitting, Cuff Size: Adult Regular)   Pulse 81   Temp 97.2  F (36.2  C) (Tympanic)   Ht 1.854 m (6' 1\")   Wt 97.5 kg (215 lb)   SpO2 95%   BMI 28.37 kg/m   Estimated body mass index is 28.37 kg/m  as calculated from the following:    Height as of this encounter: 1.854 m (6' 1\").    Weight as of this encounter: 97.5 kg (215 lb).  Medication Reconciliation: complete    "

## 2019-07-29 NOTE — PROGRESS NOTES
PSYCHIATRY CLINIC PROGRESS NOTE   20 minute medication management, more than 50% of time spent counseling patient on medications, medication side effects, symptom history and management   SUBJECTIVE / INTERIM HISTORY                                                                        Social- with ISABEL Sandhu and her kids Children- Phoebe's kids are 5, 7 , and 10 yo. Ozzy is the 13 yo Wiener dog  Last visit 6/21/19: Continue Seroquel 400 mg .    continue Klonopin 0.5 mg as prn and we will get rid of Sonata (he isn't taking) and we will add Klonopin 1 mg HS      - sleep is shitty. Waking up about 6 times per night. Wondering if he should go up on Seroquel at all  - Phoebe with today. Both agree in terms of his mental health he is doing better despite sleeping poorly  - klonopin helps with anxiety more than sleep.   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  - aggressive in past while manic. Hasn't had any major ordeals but has been agitated, snappy, irritability.     SUBSTANCE USE- smokes 1/2 PPD since age 15 yo.  past use  Meth: ~2 + years clean. , MJ. [place]: [place] and [employer]. EtOH now rare.    SYMPTOMS-  low energy, low mood. Insomnia still.  Passive SI: no intent or plan. Paranoia has improved. No manic sx.  nightmares, flashbacks, detached, angry outbursts, self-destructive, startle response, hypervigilance and fear Sleep now: about 2-3 hours then up for awhile, then another hour. His goal would be 6 continuous.  MEDICAL ROS- appetite increased (in relation to Seroquel)  SOB: usually at night. But sometimes during day as well not even with exertion.  increased appetite / weight  MEDICAL / SURGICAL HISTORY                   Patient Active Problem List   Diagnosis     Bipolar disorder with psychotic features (H)     Insomnia     History of mental disorder     Attention deficit disorder     Posttraumatic stress disorder     Decreased sex drive     Gastroesophageal reflux disease without  "esophagitis     Anxiety     ALLERGY   Abilify [aripiprazole]; Bee venom; Latex; Percocet [oxycodone-acetaminophen]; Toradol; Tramadol; and Tegretol [carbamazepine]  MEDICATIONS                                                                                             Current Outpatient Medications   Medication Sig     albuterol (PROAIR HFA/PROVENTIL HFA/VENTOLIN HFA) 108 (90 Base) MCG/ACT inhaler Inhale 2 puffs into the lungs every 6 hours as needed for shortness of breath / dyspnea or wheezing     clonazePAM (KLONOPIN) 0.5 MG tablet Take 1 tablet (0.5 mg) by mouth as needed for anxiety     clonazePAM (KLONOPIN) 1 MG tablet Take 1 tablet (1 mg) by mouth At Bedtime     omeprazole (PRILOSEC) 20 MG DR capsule TAKE 1 CAPSULE (20 MG) BY MOUTH DAILY     QUEtiapine ER (SEROQUEL XR) 400 MG 24 hr tablet TAKE 1 TABLET BY MOUTH NIGHTLY AT BEDTIME     No current facility-administered medications for this visit.          PAST MEDICATION TRIALS    Prozac (fluoxetine), Zoloft (sertraline), Paxil (paroxetine) and Cymbalta (duloxetine). Paxil sexual SEs and constipation. Mirtazapine ineffective for insomnia.   Elavil (amitriptyline).   Lithium Carbonate, Depakote and Depakot ER (valproate) and Neurontin. Topamax didn't help (gabapentin). Lithium \"that was like eating pennies and nickels\" but helped / effective. Didn't like the blood level draws with lithium. Doesn't think has been on tegretol or trileptal.   Seroquel (quetiapine)., Abilify   no older antipsychotics.   Xanax (alprazolam). Doesn't think has been on Buspar   Ambien (zolpidem) and Lunesta (eszopiclone). Lunesta helped initial insomnia, NOT middle. Ambien made him violent and agitated.       VITALS   /76 (BP Location: Left arm, Patient Position: Sitting, Cuff Size: Adult Regular)   Pulse 81   Temp 97.2  F (36.2  C) (Tympanic)   Ht 1.854 m (6' 1\")   Wt 97.5 kg (215 lb)   SpO2 95%   BMI 28.37 kg/m       PHQ9                     [unfilled]  LABS           "                                                                                                                 Recent Labs   Lab Test  05/21/18   0856  11/22/17   0914   CHOL  203*  216*   HDL  24*  25*   LDL  142*  132*   TRIG  184*  295*     Lab Results   Component Value Date    TSH 1.27 05/21/2018     Last Basic Metabolic Panel:  Lab Results   Component Value Date     05/21/2018      Lab Results   Component Value Date    POTASSIUM 3.8 05/21/2018     Lab Results   Component Value Date    CHLORIDE 103 05/21/2018     Lab Results   Component Value Date    TOO 9.7 05/21/2018     Lab Results   Component Value Date    CO2 29 05/21/2018     Lab Results   Component Value Date    BUN 8 05/21/2018     Lab Results   Component Value Date    CR 1.09 05/21/2018     Lab Results   Component Value Date    GLC 81 05/21/2018          ms (on Seroquel) March 2017    MENTAL STATUS EXAM                                                                                        Alert. Oriented to person, place, and date / time. Casually groomed, calm, cooperative with good eye contact. No problems with speech. Psychomotor: unremarkable . Mood was euthymic and affect was congruent to speech content and full range. Thought process, including associations, was unremarkable and thought content was devoid of suicidal and homicidal ideation and psychotic thought. No hallucinations. Insight was good. Judgment was intact and adequate for safety. Fund of knowledge was intact. Pt demonstrates no obvious problems with attention, concentration, language, recent or remote memory although these were not formally tested.     ASSESSMENT                                                                                                      HISTORICAL:  Initial psych note 9/14/16        NOTES:  Depakote in past didn't like it. Lithium in past metallic past but did work: doesn't want to go on it again. Trazodone: restless leg. Abilify: restless /  akathisia. Tegretol rash. Recalls gabapentin years ago ineffective. Rozerem not helping / ineffective. Ambien he had bad / violent reaction  Jani Langford is a 40 yo male with history of bipolar disorder, PTSD and chem dep - on Seroquel total 700 mg and been on for awhile. Main issue is sleep : has not had luck with sleep meds so discussed I will certainly try but given he has not found past meds effective more unlikely we are going to find med that works. We tried Remeron with no luck. We tried Topamax and no luck. He saw Dr. Garcia  and started on Mirapex. We tried Belsomra and didn't help.    Our change of Seroquel to Abilify for Jani went well initially. Then he had akathisia really bad. He has been taking Seroquel 400 mg. I was thinking carbamazepine but he had rash in past. Thinking latuda but Jani deosn't want to take that given risk for akathisia (had bad akthisia wth Abilify). ? Saphris in future instead of the Seroquel.     Today we agreed on continue Seroquel 400 mg daily     Jani signed controlled substance contract couple visits ago and submitted UDS as part of.       TREATMENT RISK STATEMENT:  The risks, benefits, alternatives and potential adverse effects have been explained and are understood by the pt.  The pt agrees to the treatment plan with the ability to do so.   The pt knows to call the clinic for any problems or access emergency care if needed.        DIAGNOSES                    PTSD   Bipolar I disorder with history of psychotic features      PLAN                                                                                                                    1)  MEDICATIONS:         -- Continue Seroquel 400 mg .    continue Klonopin 0.5 mg and Klonopin 1 mg HS    2)  THERAPY:  No Change    3)  LABS:  lipid panel and glucose, LFTs, BMP including fasting glucose were done in May '17 and then in nov '17 as was glucose. EKG 3/2017.      4)  PT MONITOR [call for probs]:  Worsening symptoms,  SI/HI, SEs from meds    5)  REFERRALS [CD, medical, other]: sleep referral : has apptmt in next couple weeks    6)  RTC: ~2 months

## 2019-07-30 ASSESSMENT — ANXIETY QUESTIONNAIRES: GAD7 TOTAL SCORE: 9

## 2019-08-22 DIAGNOSIS — Z79.899 ENCOUNTER FOR LONG-TERM (CURRENT) USE OF MEDICATIONS: Primary | ICD-10-CM

## 2019-08-22 DIAGNOSIS — F41.1 GAD (GENERALIZED ANXIETY DISORDER): ICD-10-CM

## 2019-08-22 DIAGNOSIS — F31.9 BIPOLAR I DISORDER (H): ICD-10-CM

## 2019-08-22 NOTE — TELEPHONE ENCOUNTER
Klonopin 1 and 0.5 mg -  reviewed.   Last visit: 7.29.19  Last refill: 1 mg - 7/18/19, 0.5 mg - 6/4/19    Future appointments:   Next 5 appointments (look out 90 days)    Sep 30, 2019  8:20 AM CDT  (Arrive by 8:05 AM)  Return Visit with Renee Vuong MD  [facility] ([facility] [place] ) [address] [place] [id number] [phone]

## 2019-08-23 DIAGNOSIS — Z79.899 ENCOUNTER FOR LONG-TERM (CURRENT) USE OF MEDICATIONS: ICD-10-CM

## 2019-08-23 RX ORDER — CLONAZEPAM 0.5 MG/1
0.5 TABLET ORAL PRN
Qty: 30 TABLET | Refills: 0 | Status: SHIPPED | OUTPATIENT
Start: 2019-08-23 | End: 2019-09-26

## 2019-08-23 RX ORDER — CLONAZEPAM 1 MG/1
1 TABLET ORAL AT BEDTIME
Qty: 30 TABLET | Refills: 0 | Status: SHIPPED | OUTPATIENT
Start: 2019-08-23 | End: 2019-09-26

## 2019-08-23 NOTE — TELEPHONE ENCOUNTER
I gave him a call. He was surprised it didn't show up in UDS and offered to come in again for a repeat UDS. I noted I'm fine with a repeat UDS next visit as he has always appeared to be honest and open with things including with issues pertaining to chem dependency.    I will refill.

## 2019-08-23 NOTE — TELEPHONE ENCOUNTER
Patient notified through VM that orders are in for a repeat uds.  Can come in and complete during clinic hours.  Gave call back # for questions.

## 2019-08-23 NOTE — TELEPHONE ENCOUNTER
2:13 PM    Ordered future UDS.  Patient can come in and complete anytime during clinic hours.     Thanks,   Virginia Bonilla, RN-BSN  Care Coordination, Behavioral Health

## 2019-08-23 NOTE — TELEPHONE ENCOUNTER
Return call to patient.  He has a concern that Klonopin did not show up in the UDS.  He reports taking medication every night with the Seroquel and sometimes during the day.  He would like to do a repeat UDS.  I told patient this would be fine.  Please put order in for uds and will let patient know when this is done so he can come to clinic next week.  He would also like a copy of this uds.  Thank you.

## 2019-08-26 DIAGNOSIS — K21.9 GASTROESOPHAGEAL REFLUX DISEASE WITHOUT ESOPHAGITIS: ICD-10-CM

## 2019-08-26 DIAGNOSIS — Z79.899 ENCOUNTER FOR LONG-TERM (CURRENT) USE OF MEDICATIONS: ICD-10-CM

## 2019-08-26 PROCEDURE — 99000 SPECIMEN HANDLING OFFICE-LAB: CPT | Performed by: PSYCHIATRY & NEUROLOGY

## 2019-08-26 PROCEDURE — 80307 DRUG TEST PRSMV CHEM ANLYZR: CPT | Mod: ZL,59 | Performed by: PSYCHIATRY & NEUROLOGY

## 2019-08-30 LAB — PAIN DRUG SCR UR W RPTD MEDS: NORMAL

## 2019-09-23 ENCOUNTER — ANCILLARY PROCEDURE (OUTPATIENT)
Dept: GENERAL RADIOLOGY | Facility: OTHER | Age: 39
End: 2019-09-23
Attending: NURSE PRACTITIONER
Payer: COMMERCIAL

## 2019-09-23 ENCOUNTER — OFFICE VISIT (OUTPATIENT)
Dept: FAMILY MEDICINE | Facility: OTHER | Age: 39
End: 2019-09-23
Attending: NURSE PRACTITIONER
Payer: COMMERCIAL

## 2019-09-23 ENCOUNTER — TELEPHONE (OUTPATIENT)
Dept: FAMILY MEDICINE | Facility: OTHER | Age: 39
End: 2019-09-23

## 2019-09-23 VITALS
SYSTOLIC BLOOD PRESSURE: 112 MMHG | DIASTOLIC BLOOD PRESSURE: 72 MMHG | WEIGHT: 211 LBS | BODY MASS INDEX: 27.84 KG/M2 | HEART RATE: 83 BPM | TEMPERATURE: 100.1 F | OXYGEN SATURATION: 96 %

## 2019-09-23 DIAGNOSIS — R06.00 DYSPNEA, UNSPECIFIED TYPE: ICD-10-CM

## 2019-09-23 DIAGNOSIS — J20.8 ACUTE BRONCHITIS DUE TO OTHER SPECIFIED ORGANISMS: ICD-10-CM

## 2019-09-23 DIAGNOSIS — K21.9 GASTROESOPHAGEAL REFLUX DISEASE WITHOUT ESOPHAGITIS: ICD-10-CM

## 2019-09-23 DIAGNOSIS — R05.9 COUGH: ICD-10-CM

## 2019-09-23 DIAGNOSIS — R05.9 COUGH: Primary | ICD-10-CM

## 2019-09-23 LAB
BASOPHILS # BLD AUTO: 0.1 10E9/L (ref 0–0.2)
BASOPHILS NFR BLD AUTO: 0.4 %
DIFFERENTIAL METHOD BLD: ABNORMAL
EOSINOPHIL # BLD AUTO: 0.3 10E9/L (ref 0–0.7)
EOSINOPHIL NFR BLD AUTO: 1.9 %
ERYTHROCYTE [DISTWIDTH] IN BLOOD BY AUTOMATED COUNT: 11.9 % (ref 10–15)
HCT VFR BLD AUTO: 45.5 % (ref 40–53)
HGB BLD-MCNC: 15.9 G/DL (ref 13.3–17.7)
IMM GRANULOCYTES # BLD: 0 10E9/L (ref 0–0.4)
IMM GRANULOCYTES NFR BLD: 0.3 %
LYMPHOCYTES # BLD AUTO: 2 10E9/L (ref 0.8–5.3)
LYMPHOCYTES NFR BLD AUTO: 15.2 %
MCH RBC QN AUTO: 31.4 PG (ref 26.5–33)
MCHC RBC AUTO-ENTMCNC: 34.9 G/DL (ref 31.5–36.5)
MCV RBC AUTO: 90 FL (ref 78–100)
MONOCYTES # BLD AUTO: 1.2 10E9/L (ref 0–1.3)
MONOCYTES NFR BLD AUTO: 9.2 %
NEUTROPHILS # BLD AUTO: 9.7 10E9/L (ref 1.6–8.3)
NEUTROPHILS NFR BLD AUTO: 73 %
NRBC # BLD AUTO: 0 10*3/UL
NRBC BLD AUTO-RTO: 0 /100
PLATELET # BLD AUTO: 234 10E9/L (ref 150–450)
RBC # BLD AUTO: 5.07 10E12/L (ref 4.4–5.9)
WBC # BLD AUTO: 13.3 10E9/L (ref 4–11)

## 2019-09-23 PROCEDURE — 85025 COMPLETE CBC W/AUTO DIFF WBC: CPT | Mod: ZL | Performed by: NURSE PRACTITIONER

## 2019-09-23 PROCEDURE — 36415 COLL VENOUS BLD VENIPUNCTURE: CPT | Mod: ZL | Performed by: NURSE PRACTITIONER

## 2019-09-23 PROCEDURE — G0463 HOSPITAL OUTPT CLINIC VISIT: HCPCS | Mod: 25

## 2019-09-23 PROCEDURE — 99214 OFFICE O/P EST MOD 30 MIN: CPT | Performed by: NURSE PRACTITIONER

## 2019-09-23 PROCEDURE — G0463 HOSPITAL OUTPT CLINIC VISIT: HCPCS

## 2019-09-23 PROCEDURE — 71046 X-RAY EXAM CHEST 2 VIEWS: CPT | Mod: TC

## 2019-09-23 RX ORDER — ALBUTEROL SULFATE 90 UG/1
2 AEROSOL, METERED RESPIRATORY (INHALATION) EVERY 6 HOURS PRN
Qty: 1 INHALER | Refills: 1 | Status: SHIPPED | OUTPATIENT
Start: 2019-09-23 | End: 2019-09-23

## 2019-09-23 RX ORDER — ALBUTEROL SULFATE 90 UG/1
2 AEROSOL, METERED RESPIRATORY (INHALATION) EVERY 6 HOURS
Qty: 1 INHALER | Refills: 0 | Status: SHIPPED | OUTPATIENT
Start: 2019-09-23 | End: 2021-11-08

## 2019-09-23 RX ORDER — AZITHROMYCIN 250 MG/1
TABLET, FILM COATED ORAL
Qty: 6 TABLET | Refills: 0 | Status: SHIPPED | OUTPATIENT
Start: 2019-09-23 | End: 2019-09-30

## 2019-09-23 RX ORDER — ALBUTEROL SULFATE 90 UG/1
2 AEROSOL, METERED RESPIRATORY (INHALATION) EVERY 6 HOURS PRN
Qty: 1 INHALER | Refills: 1 | Status: CANCELLED | OUTPATIENT
Start: 2019-09-23

## 2019-09-23 ASSESSMENT — PAIN SCALES - GENERAL: PAINLEVEL: NO PAIN (0)

## 2019-09-23 NOTE — PROGRESS NOTES
Subjective     Jani Langford is a 39 year old male who presents to clinic today for the following health issues:    HPI   RESPIRATORY SYMPTOMS      Duration: week     Description  nasal congestion, rhinorrhea, sore throat, cough, chills, nausea, stomach ache and diarrhea. Head aches. Patient states when he lifts up his left arm sometimes he gets a pressure feeling in his chest. And when he takes deep breaths there is a pain sometimes.     Severity: moderate    Accompanying signs and symptoms: see description     History (predisposing factors):  none    Precipitating or alleviating factors: None    Therapies tried and outcome:  Cough syrup with codeine -helped cough, tylenol cold blue syrups. Ibuprofen       Patient Active Problem List   Diagnosis     Bipolar disorder with psychotic features (H)     Insomnia     History of mental disorder     Attention deficit disorder     Posttraumatic stress disorder     Decreased sex drive     Gastroesophageal reflux disease without esophagitis     Anxiety     Past Surgical History:   Procedure Laterality Date     DENTAL SURGERY      pulled 2 teeth month ago     ENT SURGERY      sinus       Social History     Tobacco Use     Smoking status: Former Smoker     Packs/day: 0.75     Years: 24.00     Pack years: 18.00     Types: Cigarettes, Other     Start date: 1995     Last attempt to quit: 3/12/2019     Years since quittin.5     Smokeless tobacco: Never Used     Tobacco comment: vaping sometimes- noted -19    Substance Use Topics     Alcohol use: Yes     Comment: rare     Family History   Problem Relation Age of Onset     Mental Illness Mother      Depression Mother          Current Outpatient Medications   Medication Sig Dispense Refill     albuterol (PROAIR HFA/PROVENTIL HFA/VENTOLIN HFA) 108 (90 Base) MCG/ACT inhaler Inhale 2 puffs into the lungs every 6 hours as needed for shortness of breath / dyspnea or wheezing 1 Inhaler 1     clonazePAM (KLONOPIN) 0.5 MG tablet  Take 1 tablet (0.5 mg) by mouth as needed for anxiety 30 tablet 0     clonazePAM (KLONOPIN) 1 MG tablet Take 1 tablet (1 mg) by mouth At Bedtime 30 tablet 0     omeprazole (PRILOSEC) 20 MG DR capsule TAKE 1 CAPSULE (20 MG) BY MOUTH DAILY 30 capsule 1     QUEtiapine ER (SEROQUEL XR) 400 MG 24 hr tablet TAKE 1 TABLET BY MOUTH NIGHTLY AT BEDTIME 30 tablet 6     Allergies   Allergen Reactions     Abilify [Aripiprazole]      Side effects     Bee Venom      Latex Hives     Percocet [Oxycodone-Acetaminophen]      Short of breath vomiting     Toradol      seizure     Tramadol      Short of breath and vomiting     Tegretol [Carbamazepine] Rash       Reviewed and updated as needed this visit by Provider      Review of Systems   ROS COMP: Constitutional, HEENT, cardiovascular, pulmonary, gi and gu systems are negative, except as otherwise noted. +cough. +posttussis emesis. +ear fullness      Objective    /72 (BP Location: Left arm, Patient Position: Sitting, Cuff Size: Adult Large)   Pulse 83   Temp 100.1  F (37.8  C) (Tympanic)   Wt 95.7 kg (211 lb)   SpO2 96%   BMI 27.84 kg/m    Body mass index is 27.84 kg/m .  Physical Exam   GENERAL: healthy, alert and no distress  HENT: ear canals and TM's normal, nose and mouth without ulcers or lesions  NECK: no adenopathy, no asymmetry, masses, or scars and thyroid normal to palpation  RESP: no rhonchi or wheezes. +rales  CV: regular rate and rhythm, normal S1 S2, no S3 or S4, no murmur, click or rub, no peripheral edema and peripheral pulses strong  ABDOMEN: soft, nontender, no hepatosplenomegaly, no masses and bowel sounds normal  MS: no gross musculoskeletal defects noted, no edema  NEURO: Normal strength and tone, mentation intact and speech normal  PSYCH: mentation appears normal, affect normal/bright    Diagnostic Test Results:  Labs reviewed in Epic  Results for orders placed or performed in visit on 09/23/19 (from the past 24 hour(s))   CBC with platelets and  "differential   Result Value Ref Range    WBC 13.3 (H) 4.0 - 11.0 10e9/L    RBC Count 5.07 4.4 - 5.9 10e12/L    Hemoglobin 15.9 13.3 - 17.7 g/dL    Hematocrit 45.5 40.0 - 53.0 %    MCV 90 78 - 100 fl    MCH 31.4 26.5 - 33.0 pg    MCHC 34.9 31.5 - 36.5 g/dL    RDW 11.9 10.0 - 15.0 %    Platelet Count 234 150 - 450 10e9/L    Diff Method Automated Method     % Neutrophils 73.0 %    % Lymphocytes 15.2 %    % Monocytes 9.2 %    % Eosinophils 1.9 %    % Basophils 0.4 %    % Immature Granulocytes 0.3 %    Nucleated RBCs 0 0 /100    Absolute Neutrophil 9.7 (H) 1.6 - 8.3 10e9/L    Absolute Lymphocytes 2.0 0.8 - 5.3 10e9/L    Absolute Monocytes 1.2 0.0 - 1.3 10e9/L    Absolute Eosinophils 0.3 0.0 - 0.7 10e9/L    Absolute Basophils 0.1 0.0 - 0.2 10e9/L    Abs Immature Granulocytes 0.0 0 - 0.4 10e9/L    Absolute Nucleated RBC 0.0            Assessment & Plan   Assessment    Plan  (R05) Cough  (primary encounter diagnosis)  Comment:  Productive cough. Check chest XRAY  Plan: XR CHEST 2 VW (Clinic Performed), CBC with         platelets and differential, albuterol (PROAIR         HFA/PROVENTIL HFA/VENTOLIN HFA) 108 (90 Base)         MCG/ACT inhaler            (R06.00) Dyspnea, unspecified type  Comment: Has bronchitis. ABX and albuterol  Plan: albuterol (PROAIR HFA/PROVENTIL HFA/VENTOLIN         HFA) 108 (90 Base) MCG/ACT inhaler            (K21.9) Gastroesophageal reflux disease without esophagitis  Comment: refill.   Plan: omeprazole (PRILOSEC) 20 MG DR capsule            (J20.8) Acute bronchitis due to other specified organisms  Comment: Treat with ABX and Albuterol inhaler. Follow up if not improving  Plan: azithromycin (ZITHROMAX Z-STEPHANIE) 250 MG tablet              BMI:   Estimated body mass index is 27.84 kg/m  as calculated from the following:    Height as of 7/29/19: 1.854 m (6' 1\").    Weight as of this encounter: 95.7 kg (211 lb).   Weight management plan: Discussed healthy diet and exercise guidelines      See Patient " Instructions    Return if symptoms worsen or fail to improve.    Eli Harper NP  [facility]

## 2019-09-23 NOTE — TELEPHONE ENCOUNTER
"Pt reports congestion, cough, pt reports he was previous \"dx with pneumonia a few months ago\". Reports he believes he may have pneumonia again. Pt also stated he is willing to see any provider today in [place]\".    Next 5 appointments (look out 90 days)    Sep 23, 2019  1:45 PM CDT  (Arrive by 1:30 PM)  SHORT with Eli Harper NP  [facility] [place] ([facility] [place] ) [address] [place] [address]  [place] [address]  [phone]   Sep 30, 2019  8:20 AM CDT  (Arrive by 8:05 AM)  Return Visit with Renee Vuong MD  [facility] [place] ([facility] [place] ) [address] [place] [address]  [place] [id number] [phone]          "

## 2019-09-23 NOTE — TELEPHONE ENCOUNTER
7:38 AM    Reason for Call: OVERBOOK    Patient is having the following symptoms: chest congestion // sinus issues for 1 weeks.    The patient is requesting an appointment for 09/23/19 with .    Was an appointment offered for this call? Yes  If yes : Appointment type              Date    Preferred method for responding to this message: Telephone Call  What is your phone number ?[phone]    If we cannot reach you directly, may we leave a detailed response at the number you provided? Yes    Can this message wait until your PCP/provider returns, if unavailable today? Not applicable, pcp is in     [facility]

## 2019-09-23 NOTE — PATIENT INSTRUCTIONS
Patient Education     Patient Education     Acute Bronchitis  Your healthcare provider has told you that you have acute bronchitis. Bronchitis is infection or inflammation of the bronchial tubes (airways in the lungs). Normally, air moves easily in and out of the airways. Bronchitis narrows the airways, making it harder for air to flow in and out of the lungs. This causes symptoms such as shortness of breath, coughing up yellow or green mucus, and wheezing. Bronchitis can be acute or chronic. Acute means the condition comes on quickly and goes away in a short time, usually within 3 to 10 days. Chronic means a condition lasts a long time and often comes back.    What causes acute bronchitis?  Acute bronchitis almost always starts as a viral respiratory infection, such as a cold or the flu. Certain factors make it more likely for a cold or flu to turn into bronchitis. These include being very young, being elderly, having a heart or lung problem, or having a weak immune system. Cigarette smoking also makes bronchitis more likely.  When bronchitis develops, the airways become swollen. The airways may also become infected with bacteria. This is known as a secondary infection.  Diagnosing acute bronchitis  Your healthcare provider will examine you and ask about your symptoms and health history. You may also have a sputum culture to test the fluid in your lungs. Chest X-rays may be done to look for infection in the lungs.  Treating acute bronchitis  Bronchitis usually clears up as the cold or flu goes away. You can help feel better faster by doing the following:    Take medicine as directed. You may be told to take ibuprofen or other over-the-counter medicines. These help relieve inflammation in your bronchial tubes. Your healthcare provider may prescribe an inhaler to help open up the bronchial tubes. Most of the time, acute bronchitis is caused by a viral infection. Antibiotics are usually not prescribed for viral  infections.    Drink plenty of fluids, such as water, juice, or warm soup. Fluids loosen mucus so that you can cough it up. This helps you breathe more easily. Fluids also prevent dehydration.    Make sure you get plenty of rest.    Do not smoke. Do not allow anyone else to smoke in your home.  Recovery and follow-up  Follow up with your doctor as you are told. You will likely feel better in a week or two. But a dry cough can linger beyond that time. Let your doctor know if you still have symptoms (other than a dry cough) after 2 weeks, or if you re prone to getting bronchial infections. Take steps to protect yourself from future infections. These steps include stopping smoking and avoiding tobacco smoke, washing your hands often, and getting a yearly flu shot.  When to call your healthcare provider  Call the healthcare provider if you have any of the following:    Fever of 100.4 F (38.0 C) or higher, or as advised    Symptoms that get worse, or new symptoms    Trouble breathing    Symptoms that don t start to improve within a week, or within 3 days of taking antibiotics   Date Last Reviewed: 12/1/2016 2000-2018 [employer]. [address] [place] [address], [place] [address]. All rights reserved. This information is not intended as a substitute for professional medical care. Always follow your healthcare professional's instructions.    Take antibiotics as ordered.   Eat a yogurt daily or take a probiotic daily while taking antibiotics.   Take Zyrtec or Claritin daily for 10 days.   Use Albuterol inhaler as needed as directed.   Increase water intake.   Follow up if not improving.

## 2019-09-23 NOTE — NURSING NOTE
"Chief Complaint   Patient presents with     URI       Initial /72 (BP Location: Left arm, Patient Position: Sitting, Cuff Size: Adult Large)   Pulse 83   Temp 100.1  F (37.8  C) (Tympanic)   Wt 95.7 kg (211 lb)   SpO2 96%   BMI 27.84 kg/m   Estimated body mass index is 27.84 kg/m  as calculated from the following:    Height as of 7/29/19: 1.854 m (6' 1\").    Weight as of this encounter: 95.7 kg (211 lb).  Medication Reconciliation: complete  Laura Loza  "

## 2019-09-24 DIAGNOSIS — F31.9 BIPOLAR I DISORDER (H): ICD-10-CM

## 2019-09-24 DIAGNOSIS — F41.1 GAD (GENERALIZED ANXIETY DISORDER): ICD-10-CM

## 2019-09-24 NOTE — TELEPHONE ENCOUNTER
clonazePAM (KLONOPIN) 0.5 MG tablet  Last Written Prescription Date:  8/23/19  Last Fill Quantity: 30,   # refills: 0  Last Office Visit: 9/24/19  Future Office visit:    Next 5 appointments (look out 90 days)    Sep 30, 2019  8:20 AM CDT  (Arrive by 8:05 AM)  Return Visit with Renee Vuong MD  [facility] ([facility] ) [address] [place] [phone]  [phone]       clonazePAM (KLONOPIN)1MG tablet      Last Written Prescription Date:  8/23/19  Last Fill Quantity: 30,   # refills: 0  Last Office Visit: 9/24/19  Future Office visit:    Next 5 appointments (look out 90 days)    Sep 30, 2019  8:20 AM CDT  (Arrive by 8:05 AM)  Return Visit with Renee Vuong MD  [facility] ([facility] ) [address] [place] [phone]  [phone]

## 2019-09-26 RX ORDER — CLONAZEPAM 1 MG/1
1 TABLET ORAL AT BEDTIME
Qty: 30 TABLET | Refills: 0 | Status: SHIPPED | OUTPATIENT
Start: 2019-09-26 | End: 2019-09-30

## 2019-09-26 RX ORDER — CLONAZEPAM 0.5 MG/1
0.5 TABLET ORAL PRN
Qty: 30 TABLET | Refills: 0 | Status: SHIPPED | OUTPATIENT
Start: 2019-09-26 | End: 2019-09-30

## 2019-09-30 ENCOUNTER — OFFICE VISIT (OUTPATIENT)
Dept: PSYCHIATRY | Facility: OTHER | Age: 39
End: 2019-09-30
Attending: PSYCHIATRY & NEUROLOGY
Payer: COMMERCIAL

## 2019-09-30 VITALS
SYSTOLIC BLOOD PRESSURE: 118 MMHG | HEIGHT: 72 IN | DIASTOLIC BLOOD PRESSURE: 72 MMHG | BODY MASS INDEX: 29.53 KG/M2 | OXYGEN SATURATION: 96 % | TEMPERATURE: 97.3 F | WEIGHT: 218 LBS | HEART RATE: 79 BPM

## 2019-09-30 DIAGNOSIS — F41.1 GAD (GENERALIZED ANXIETY DISORDER): ICD-10-CM

## 2019-09-30 DIAGNOSIS — F31.9 BIPOLAR I DISORDER (H): ICD-10-CM

## 2019-09-30 PROCEDURE — G0463 HOSPITAL OUTPT CLINIC VISIT: HCPCS

## 2019-09-30 PROCEDURE — 99213 OFFICE O/P EST LOW 20 MIN: CPT | Performed by: PSYCHIATRY & NEUROLOGY

## 2019-09-30 RX ORDER — CLONAZEPAM 0.5 MG/1
0.5 TABLET ORAL PRN
Qty: 30 TABLET | Refills: 0 | Status: SHIPPED | OUTPATIENT
Start: 2019-09-30 | End: 2019-12-04

## 2019-09-30 RX ORDER — CLONAZEPAM 1 MG/1
1 TABLET ORAL AT BEDTIME
Qty: 30 TABLET | Refills: 0 | Status: SHIPPED | OUTPATIENT
Start: 2019-09-30 | End: 2019-11-04

## 2019-09-30 ASSESSMENT — ANXIETY QUESTIONNAIRES
7. FEELING AFRAID AS IF SOMETHING AWFUL MIGHT HAPPEN: SEVERAL DAYS
1. FEELING NERVOUS, ANXIOUS, OR ON EDGE: NEARLY EVERY DAY
4. TROUBLE RELAXING: NEARLY EVERY DAY
IF YOU CHECKED OFF ANY PROBLEMS ON THIS QUESTIONNAIRE, HOW DIFFICULT HAVE THESE PROBLEMS MADE IT FOR YOU TO DO YOUR WORK, TAKE CARE OF THINGS AT HOME, OR GET ALONG WITH OTHER PEOPLE: VERY DIFFICULT
5. BEING SO RESTLESS THAT IT IS HARD TO SIT STILL: NOT AT ALL
GAD7 TOTAL SCORE: 15
6. BECOMING EASILY ANNOYED OR IRRITABLE: MORE THAN HALF THE DAYS
2. NOT BEING ABLE TO STOP OR CONTROL WORRYING: NEARLY EVERY DAY
3. WORRYING TOO MUCH ABOUT DIFFERENT THINGS: NEARLY EVERY DAY

## 2019-09-30 ASSESSMENT — PATIENT HEALTH QUESTIONNAIRE - PHQ9: SUM OF ALL RESPONSES TO PHQ QUESTIONS 1-9: 15

## 2019-09-30 ASSESSMENT — PAIN SCALES - GENERAL: PAINLEVEL: NO PAIN (0)

## 2019-09-30 ASSESSMENT — MIFFLIN-ST. JEOR: SCORE: 1951.84

## 2019-09-30 NOTE — NURSING NOTE
Chief Complaint   Patient presents with     Mental Health Problem     Pt is here for a recheck.  Pt has not been doing well.       Initial /72 (BP Location: Left arm, Cuff Size: Adult Regular)   Pulse 79   Temp 97.3  F (36.3  C) (Tympanic)   Ht 1.829 m (6')   Wt 98.9 kg (218 lb)   SpO2 96%   BMI 29.57 kg/m   Estimated body mass index is 29.57 kg/m  as calculated from the following:    Height as of this encounter: 1.829 m (6').    Weight as of this encounter: 98.9 kg (218 lb).  Medication Reconciliation: complete  Ellie Worley LPN

## 2019-10-01 ASSESSMENT — ANXIETY QUESTIONNAIRES: GAD7 TOTAL SCORE: 15

## 2019-11-04 DIAGNOSIS — F31.9 BIPOLAR I DISORDER (H): ICD-10-CM

## 2019-11-05 DIAGNOSIS — F31.9 BIPOLAR I DISORDER (H): ICD-10-CM

## 2019-11-05 RX ORDER — QUETIAPINE 400 MG/1
TABLET, FILM COATED, EXTENDED RELEASE ORAL
Qty: 30 TABLET | Refills: 6 | Status: SHIPPED | OUTPATIENT
Start: 2019-11-05 | End: 2020-05-04

## 2019-11-05 RX ORDER — CLONAZEPAM 1 MG/1
TABLET ORAL
Qty: 30 TABLET | Refills: 0 | Status: SHIPPED | OUTPATIENT
Start: 2019-11-05 | End: 2019-11-25

## 2019-11-25 ENCOUNTER — OFFICE VISIT (OUTPATIENT)
Dept: PSYCHIATRY | Facility: OTHER | Age: 39
End: 2019-11-25
Attending: PSYCHIATRY & NEUROLOGY
Payer: COMMERCIAL

## 2019-11-25 VITALS
HEART RATE: 89 BPM | SYSTOLIC BLOOD PRESSURE: 120 MMHG | TEMPERATURE: 98 F | DIASTOLIC BLOOD PRESSURE: 70 MMHG | WEIGHT: 220 LBS | BODY MASS INDEX: 29.8 KG/M2 | OXYGEN SATURATION: 94 % | HEIGHT: 72 IN

## 2019-11-25 DIAGNOSIS — F31.9 BIPOLAR I DISORDER (H): ICD-10-CM

## 2019-11-25 PROCEDURE — G0463 HOSPITAL OUTPT CLINIC VISIT: HCPCS

## 2019-11-25 PROCEDURE — 99213 OFFICE O/P EST LOW 20 MIN: CPT | Performed by: PSYCHIATRY & NEUROLOGY

## 2019-11-25 RX ORDER — CLONAZEPAM 1 MG/1
1 TABLET ORAL AT BEDTIME
Qty: 30 TABLET | Refills: 0 | Status: SHIPPED | OUTPATIENT
Start: 2019-12-02 | End: 2020-01-07

## 2019-11-25 ASSESSMENT — ANXIETY QUESTIONNAIRES
7. FEELING AFRAID AS IF SOMETHING AWFUL MIGHT HAPPEN: SEVERAL DAYS
6. BECOMING EASILY ANNOYED OR IRRITABLE: SEVERAL DAYS
5. BEING SO RESTLESS THAT IT IS HARD TO SIT STILL: NOT AT ALL
GAD7 TOTAL SCORE: 13
3. WORRYING TOO MUCH ABOUT DIFFERENT THINGS: NEARLY EVERY DAY
1. FEELING NERVOUS, ANXIOUS, OR ON EDGE: NEARLY EVERY DAY
IF YOU CHECKED OFF ANY PROBLEMS ON THIS QUESTIONNAIRE, HOW DIFFICULT HAVE THESE PROBLEMS MADE IT FOR YOU TO DO YOUR WORK, TAKE CARE OF THINGS AT HOME, OR GET ALONG WITH OTHER PEOPLE: VERY DIFFICULT
2. NOT BEING ABLE TO STOP OR CONTROL WORRYING: MORE THAN HALF THE DAYS

## 2019-11-25 ASSESSMENT — PAIN SCALES - GENERAL: PAINLEVEL: NO PAIN (0)

## 2019-11-25 ASSESSMENT — MIFFLIN-ST. JEOR: SCORE: 1960.91

## 2019-11-25 ASSESSMENT — PATIENT HEALTH QUESTIONNAIRE - PHQ9
5. POOR APPETITE OR OVEREATING: NEARLY EVERY DAY
SUM OF ALL RESPONSES TO PHQ QUESTIONS 1-9: 15

## 2019-11-25 NOTE — PROGRESS NOTES
PSYCHIATRY CLINIC PROGRESS NOTE   20 minute medication management, more than 50% of time spent counseling patient on medications, medication side effects, symptom history and management   SUBJECTIVE / INTERIM HISTORY                                                                        Social- with GF Phoebe and her kids Children- Phoebe's kids are 5, 7 , and 12 yo. Ozzy is the 15 yo Wiener dog  Last visit: -- Continue Seroquel 400 mg .    continue Klonopin 0.5 mg and Klonopin 1 mg HS and will refill today    - generally doing okay including he and Phoebe in terms of their relationship  - kid's tested and Kirit 8 yo  dx with impulse disorder type and Vanessa with PTSD  - klonopin helps with anxiety more than sleep.   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  - aggressive in past while manic. Hasn't had any major ordeals but has been agitated, snappy, irritability.     SUBSTANCE USE- smokes 1/2 PPD since age 17 yo.  past use  Meth: ~2 + years clean. , MJ. TX: [place] and [employer]. EtOH now rare.    SYMPTOMS-  low energy, low mood. Insomnia still.  Passive SI: no intent or plan. Paranoia has improved. No manic sx.  nightmares, flashbacks, detached, angry outbursts, self-destructive, startle response, hypervigilance and fear Sleep now: about 2-3 hours then up for awhile, then another hour. His goal would be 6 continuous.  MEDICAL ROS-had cough, some SOB recently and feeling better.  appetite increased (in relation to Seroquel)  SOB: usually at night. But sometimes during day as well not even with exertion.  increased appetite / weight  MEDICAL / SURGICAL HISTORY                   Patient Active Problem List   Diagnosis     Bipolar disorder with psychotic features (H)     Insomnia     History of mental disorder     Attention deficit disorder     Posttraumatic stress disorder     Decreased sex drive     Gastroesophageal reflux disease without esophagitis     Anxiety     ALLERGY   Abilify  "[aripiprazole]; Bee venom; Latex; Percocet [oxycodone-acetaminophen]; Toradol; Tramadol; and Tegretol [carbamazepine]  MEDICATIONS                                                                                             Current Outpatient Medications   Medication Sig     albuterol (PROAIR HFA/PROVENTIL HFA/VENTOLIN HFA) 108 (90 Base) MCG/ACT inhaler Inhale 2 puffs into the lungs every 6 hours     clonazePAM (KLONOPIN) 0.5 MG tablet Take 1 tablet (0.5 mg) by mouth as needed for anxiety     clonazePAM (KLONOPIN) 1 MG tablet TAKE 1 TABLET BY MOUTH NIGHTLY AT BEDTIME     omeprazole (PRILOSEC) 20 MG DR capsule TAKE 1 CAPSULE (20 MG) BY MOUTH DAILY     QUEtiapine ER (SEROQUEL XR) 400 MG 24 hr tablet TAKE 1 TABLET BY MOUTH NIGHTLY AT BEDTIME     No current facility-administered medications for this visit.          PAST MEDICATION TRIALS    Prozac (fluoxetine), Zoloft (sertraline), Paxil (paroxetine) and Cymbalta (duloxetine). Paxil sexual SEs and constipation. Mirtazapine ineffective for insomnia.   Elavil (amitriptyline).   Lithium Carbonate, Depakote and Depakot ER (valproate) and Neurontin. Topamax didn't help (gabapentin). Lithium \"that was like eating pennies and nickels\" but helped / effective. Didn't like the blood level draws with lithium. Doesn't think has been on tegretol or trileptal.   Seroquel (quetiapine)., Abilify   no older antipsychotics.   Xanax (alprazolam). Doesn't think has been on Buspar   Ambien (zolpidem) and Lunesta (eszopiclone). Lunesta helped initial insomnia, NOT middle. Ambien made him violent and agitated.       VITALS   /70 (BP Location: Left arm, Patient Position: Sitting, Cuff Size: Adult Regular)   Pulse 89   Temp 98  F (36.7  C) (Tympanic)   Ht 1.829 m (6')   Wt 99.8 kg (220 lb)   SpO2 94%   BMI 29.84 kg/m       PHQ9                     [unfilled]  LABS                                                                                                                     "       Recent Labs   Lab Test  05/21/18   0856  11/22/17   0914   CHOL  203*  216*   HDL  24*  25*   LDL  142*  132*   TRIG  184*  295*     Lab Results   Component Value Date    TSH 1.27 05/21/2018     Last Basic Metabolic Panel:  Lab Results   Component Value Date     05/21/2018      Lab Results   Component Value Date    POTASSIUM 3.8 05/21/2018     Lab Results   Component Value Date    CHLORIDE 103 05/21/2018     Lab Results   Component Value Date    TOO 9.7 05/21/2018     Lab Results   Component Value Date    CO2 29 05/21/2018     Lab Results   Component Value Date    BUN 8 05/21/2018     Lab Results   Component Value Date    CR 1.09 05/21/2018     Lab Results   Component Value Date    GLC 81 05/21/2018          ms (on Seroquel) March 2017    MENTAL STATUS EXAM                                                                                        Alert. Oriented to person, place, and date / time. Casually groomed, calm, cooperative with good eye contact. No problems with speech. Psychomotor: unremarkable . Mood was irritable and affect was congruent to speech content and full range. Thought process, including associations, was unremarkable and thought content was devoid of suicidal and homicidal ideation and psychotic thought. No hallucinations. Insight was good. Judgment was intact and adequate for safety. Fund of knowledge was intact. Pt demonstrates no obvious problems with attention, concentration, language, recent or remote memory although these were not formally tested.     ASSESSMENT                                                                                                      HISTORICAL:  Initial psych note 9/14/16        NOTES:  Depakote in past didn't like it. Lithium in past metallic past but did work: doesn't want to go on it again. Trazodone: restless leg. Abilify: restless / akathisia. Tegretol rash. Recalls gabapentin years ago ineffective. Rozerem not helping / ineffective.  Ambien he had bad / violent reaction  Jani Langford is a 38 yo male with history of bipolar disorder, PTSD and chem dep - on Seroquel total 700 mg and been on for awhile. Main issue is sleep : has not had luck with sleep meds so discussed I will certainly try but given he has not found past meds effective more unlikely we are going to find med that works. We tried Remeron with no luck. We tried Topamax and no luck. He saw Dr. Garcia  and started on Mirapex. We tried Belsomra and didn't help.    Our change of Seroquel to Abilify for Jani went well initially. Then he had akathisia and dystonia. Thoughts for future: I was thinking carbamazepine but he had rash in past. Thinking latuda but Jani deosn't want to take that given risk for akathisia. Saphris?  Today we agreed on continue Seroquel 400 mg daily     Controlled substance contract and UDS done / in place.       TREATMENT RISK STATEMENT:  The risks, benefits, alternatives and potential adverse effects have been explained and are understood by the pt.  The pt agrees to the treatment plan with the ability to do so.   The pt knows to call the clinic for any problems or access emergency care if needed.        DIAGNOSES                    PTSD   Bipolar I disorder with history of psychotic features      PLAN                                                                                                                    1)  MEDICATIONS:         -- Continue Seroquel 400 mg .    continue Klonopin 0.5 mg and Klonopin 1 mg HS and I set to refill December 2 in ~ week  2)  THERAPY:  No Change    3)  LABS:  lipid panel and glucose, LFTs, BMP including fasting glucose were done in May '17 and then in nov '17 as was glucose. EKG 3/2017.      4)  PT MONITOR [call for probs]:  Worsening symptoms, SI/HI, SEs from meds    5)  REFERRALS [CD, medical, other]: sleep referral : has apptmt in next couple weeks    6)  RTC: ~2 months

## 2019-11-25 NOTE — NURSING NOTE
Chief Complaint   Patient presents with     RECHECK     Mental health.       Initial /70 (BP Location: Left arm, Patient Position: Sitting, Cuff Size: Adult Regular)   Pulse 89   Temp 98  F (36.7  C) (Tympanic)   Ht 1.829 m (6')   Wt 99.8 kg (220 lb)   SpO2 94%   BMI 29.84 kg/m   Estimated body mass index is 29.84 kg/m  as calculated from the following:    Height as of this encounter: 1.829 m (6').    Weight as of this encounter: 99.8 kg (220 lb).  Medication Reconciliation: complete  KIERAN GARCIA LPN

## 2019-11-26 ASSESSMENT — ANXIETY QUESTIONNAIRES: GAD7 TOTAL SCORE: 13

## 2019-12-04 DIAGNOSIS — F41.1 GAD (GENERALIZED ANXIETY DISORDER): ICD-10-CM

## 2019-12-04 DIAGNOSIS — F31.9 BIPOLAR I DISORDER (H): ICD-10-CM

## 2019-12-04 DIAGNOSIS — K21.9 GASTROESOPHAGEAL REFLUX DISEASE WITHOUT ESOPHAGITIS: ICD-10-CM

## 2019-12-04 RX ORDER — CLONAZEPAM 0.5 MG/1
0.5 TABLET ORAL PRN
Qty: 30 TABLET | Refills: 0 | Status: SHIPPED | OUTPATIENT
Start: 2019-12-04 | End: 2020-01-06

## 2019-12-04 RX ORDER — CLONAZEPAM 1 MG/1
1 TABLET ORAL AT BEDTIME
Qty: 30 TABLET | Refills: 0 | OUTPATIENT
Start: 2019-12-04

## 2019-12-04 NOTE — TELEPHONE ENCOUNTER
Prilosec      Last Written Prescription Date:  09/23/19  Last Fill Quantity: 30,   # refills: 1  Last Office Visit: 09/23/19  Future Office visit:    Next 5 appointments (look out 90 days)    Jan 07, 2020 11:20 AM CST  (Arrive by 11:05 AM)  Return Visit with Renee Vuong MD  [facility] ([facility] ) [address] [place] [phone]

## 2019-12-04 NOTE — TELEPHONE ENCOUNTER
Klonopin -  reviewed   Last visit: 11.25.19  Last refill: 10.31.19    Future appointments:   Next 5 appointments (look out 90 days)    Jan 07, 2020 11:20 AM CST  (Arrive by 11:05 AM)  Return Visit with Renee Vuong MD  [facility] ([facility] ) [address] [place] [phone]

## 2019-12-09 ENCOUNTER — TELEPHONE (OUTPATIENT)
Dept: PSYCHIATRY | Facility: OTHER | Age: 39
End: 2019-12-09

## 2019-12-09 NOTE — LETTER
December 10, 2019      I work with Jani Langford in my psychiatry clinic at [facility]. I am writing to note I do not feel he should participate in jury duty as I have concern it could exacerbate his mental health symptoms.       Sincerely,      Renee Vuong MD

## 2019-12-09 NOTE — TELEPHONE ENCOUNTER
1:18 PM    Received voicemail from patient requesting letter for jury duty.  Contacted patient for more information.  Patient states he needs a statement from provider stating that he cannot participate in jury duty.  Advised patient that  is out 12/09/19 but will be returning tomorrow and will notify patient when this is completed.     Virginia Bonilla, RN-BSN  Care Coordination, Behavioral Health

## 2019-12-10 NOTE — TELEPHONE ENCOUNTER
Patient notified that letter for jury duty is ready at the lower level registration desk to be picked up.     Virginia Bonilla, RN-BSN  Care Coordination, Behavioral Health

## 2020-01-06 DIAGNOSIS — F41.1 GAD (GENERALIZED ANXIETY DISORDER): ICD-10-CM

## 2020-01-06 RX ORDER — CLONAZEPAM 0.5 MG/1
TABLET ORAL
Qty: 30 TABLET | Refills: 0 | Status: SHIPPED | OUTPATIENT
Start: 2020-01-06 | End: 2020-02-04

## 2020-01-06 NOTE — TELEPHONE ENCOUNTER
Klonopin 0.5 mg -  reviewed  Last visit: 11.25.19  Last refill: 12.4.19    Future appointments:   Next 5 appointments (look out 90 days)    Jan 07, 2020 11:20 AM CST  (Arrive by 11:05 AM)  Return Visit with Renee Vuong MD  [facility] ([facility] ) [address] [place] [phone]           detailed exam

## 2020-01-07 ENCOUNTER — OFFICE VISIT (OUTPATIENT)
Dept: PSYCHIATRY | Facility: OTHER | Age: 40
End: 2020-01-07
Attending: PSYCHIATRY & NEUROLOGY
Payer: COMMERCIAL

## 2020-01-07 VITALS
OXYGEN SATURATION: 95 % | HEART RATE: 88 BPM | SYSTOLIC BLOOD PRESSURE: 116 MMHG | BODY MASS INDEX: 29.39 KG/M2 | HEIGHT: 72 IN | DIASTOLIC BLOOD PRESSURE: 78 MMHG | TEMPERATURE: 98.2 F | WEIGHT: 217 LBS

## 2020-01-07 DIAGNOSIS — F31.9 BIPOLAR I DISORDER (H): ICD-10-CM

## 2020-01-07 PROCEDURE — 99214 OFFICE O/P EST MOD 30 MIN: CPT | Performed by: PSYCHIATRY & NEUROLOGY

## 2020-01-07 PROCEDURE — G0463 HOSPITAL OUTPT CLINIC VISIT: HCPCS

## 2020-01-07 RX ORDER — CLONAZEPAM 1 MG/1
1 TABLET ORAL AT BEDTIME
Qty: 30 TABLET | Refills: 0 | Status: SHIPPED | OUTPATIENT
Start: 2020-01-07 | End: 2020-02-04

## 2020-01-07 RX ORDER — LAMOTRIGINE 25 MG/1
TABLET ORAL
Qty: 90 TABLET | Refills: 3 | Status: SHIPPED | OUTPATIENT
Start: 2020-01-07 | End: 2020-04-24

## 2020-01-07 ASSESSMENT — ANXIETY QUESTIONNAIRES
7. FEELING AFRAID AS IF SOMETHING AWFUL MIGHT HAPPEN: SEVERAL DAYS
5. BEING SO RESTLESS THAT IT IS HARD TO SIT STILL: SEVERAL DAYS
2. NOT BEING ABLE TO STOP OR CONTROL WORRYING: NEARLY EVERY DAY
GAD7 TOTAL SCORE: 16
3. WORRYING TOO MUCH ABOUT DIFFERENT THINGS: NEARLY EVERY DAY
6. BECOMING EASILY ANNOYED OR IRRITABLE: MORE THAN HALF THE DAYS
1. FEELING NERVOUS, ANXIOUS, OR ON EDGE: NEARLY EVERY DAY
IF YOU CHECKED OFF ANY PROBLEMS ON THIS QUESTIONNAIRE, HOW DIFFICULT HAVE THESE PROBLEMS MADE IT FOR YOU TO DO YOUR WORK, TAKE CARE OF THINGS AT HOME, OR GET ALONG WITH OTHER PEOPLE: VERY DIFFICULT

## 2020-01-07 ASSESSMENT — MIFFLIN-ST. JEOR: SCORE: 1947.31

## 2020-01-07 ASSESSMENT — PATIENT HEALTH QUESTIONNAIRE - PHQ9
5. POOR APPETITE OR OVEREATING: NEARLY EVERY DAY
SUM OF ALL RESPONSES TO PHQ QUESTIONS 1-9: 12

## 2020-01-07 ASSESSMENT — PAIN SCALES - GENERAL: PAINLEVEL: NO PAIN (0)

## 2020-01-07 NOTE — NURSING NOTE
Chief Complaint   Patient presents with     RECHECK     Mental health.       Initial /78 (BP Location: Left arm, Patient Position: Sitting, Cuff Size: Adult Regular)   Pulse 88   Temp 98.2  F (36.8  C) (Tympanic)   Ht 1.829 m (6')   Wt 98.4 kg (217 lb)   SpO2 95%   BMI 29.43 kg/m   Estimated body mass index is 29.43 kg/m  as calculated from the following:    Height as of this encounter: 1.829 m (6').    Weight as of this encounter: 98.4 kg (217 lb).  Medication Reconciliation: complete  KIERAN GARCIA LPN

## 2020-01-07 NOTE — PROGRESS NOTES
PSYCHIATRY CLINIC PROGRESS NOTE   20 minute medication management, more than 50% of time spent counseling patient on medications, medication side effects, symptom history and management   SUBJECTIVE / INTERIM HISTORY                                                                        Social- with GF Phoebe and her kids Children- Phoebe's kids are 5, 7 , and 10 yo. Ozzy is the 15 yo Wiener dog   Last visit: -- Continue Seroquel 400 mg .    continue Klonopin 0.5 mg and Klonopin 1 mg HS and will refill today    - been feeling down. No particular reason  - their one kiddo still having major issues to point kid is peeing in their room. He likely is going to go to treatment [place]  - generally doing okay including he and Phoebe in terms of their relationship  - kid's tested and Kirit 10 yo dx with impulse disorder type and Vanessa with PTSD  - klonopin helps with anxiety more than sleep.   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    SUBSTANCE USE- smokes 1/2 PPD since age 17 yo.  past use  Meth: ~2 + years clean. , MJ. TX: [place] and [employer]. EtOH now rare.    SYMPTOMS-  low energy, low mood, depressed mood,  Insomnia still.  Passive SI: no intent or plan. . No manic sx.  nightmares, flashbacks, detached, angry outbursts, self-destructive, startle response, hypervigilance and fear Sleep now: about 2-3 hours then up for awhile, then another hour. His goal would be 6 continuous.  MEDICAL ROS-  appetite increased (in relation to Seroquel)  SOB: usually at night. But sometimes during day as well not even with exertion.  increased appetite / weight  MEDICAL / SURGICAL HISTORY                   Patient Active Problem List   Diagnosis     Bipolar disorder with psychotic features (H)     Insomnia     History of mental disorder     Attention deficit disorder     Posttraumatic stress disorder     Decreased sex drive     Gastroesophageal reflux disease without esophagitis     Anxiety     ALLERGY  "  Abilify [aripiprazole]; Bee venom; Latex; Percocet [oxycodone-acetaminophen]; Toradol; Tramadol; and Tegretol [carbamazepine]  MEDICATIONS                                                                                             Current Outpatient Medications   Medication Sig     albuterol (PROAIR HFA/PROVENTIL HFA/VENTOLIN HFA) 108 (90 Base) MCG/ACT inhaler Inhale 2 puffs into the lungs every 6 hours     clonazePAM (KLONOPIN) 0.5 MG tablet TAKE 1 TABLET (0.5 MG) BY MOUTH DAILY AS NEEDED FOR ANXIETY     clonazePAM (KLONOPIN) 1 MG tablet Take 1 tablet (1 mg) by mouth At Bedtime     omeprazole (PRILOSEC) 20 MG DR capsule TAKE 1 CAPSULE (20 MG) BY MOUTH DAILY     QUEtiapine ER (SEROQUEL XR) 400 MG 24 hr tablet TAKE 1 TABLET BY MOUTH NIGHTLY AT BEDTIME     No current facility-administered medications for this visit.          PAST MEDICATION TRIALS    Prozac (fluoxetine), Zoloft (sertraline), Paxil (paroxetine) and Cymbalta (duloxetine). Paxil sexual SEs and constipation. Mirtazapine ineffective for insomnia.   Elavil (amitriptyline).   Lithium Carbonate, Depakote and Depakot ER (valproate) and Neurontin. Topamax didn't help (gabapentin). Lithium \"that was like eating pennies and nickels\" but helped / effective. Didn't like the blood level draws with lithium. Doesn't think has been on tegretol or trileptal.   Seroquel (quetiapine)., Abilify   no older antipsychotics.   Xanax (alprazolam). Doesn't think has been on Buspar   Ambien (zolpidem) and Lunesta (eszopiclone). Lunesta helped initial insomnia, NOT middle. Ambien made him violent and agitated.       VITALS   /78 (BP Location: Left arm, Patient Position: Sitting, Cuff Size: Adult Regular)   Pulse 88   Temp 98.2  F (36.8  C) (Tympanic)   Ht 1.829 m (6')   Wt 98.4 kg (217 lb)   SpO2 95%   BMI 29.43 kg/m       PHQ9                     [unfilled]  LABS                                                                                                    "                        Recent Labs   Lab Test  05/21/18   0856  11/22/17   0914   CHOL  203*  216*   HDL  24*  25*   LDL  142*  132*   TRIG  184*  295*     Lab Results   Component Value Date    TSH 1.27 05/21/2018     Last Basic Metabolic Panel:  Lab Results   Component Value Date     05/21/2018      Lab Results   Component Value Date    POTASSIUM 3.8 05/21/2018     Lab Results   Component Value Date    CHLORIDE 103 05/21/2018     Lab Results   Component Value Date    TOO 9.7 05/21/2018     Lab Results   Component Value Date    CO2 29 05/21/2018     Lab Results   Component Value Date    BUN 8 05/21/2018     Lab Results   Component Value Date    CR 1.09 05/21/2018     Lab Results   Component Value Date    GLC 81 05/21/2018        ms (on Seroquel) March 2017    MENTAL STATUS EXAM                                                                                        Alert. Oriented to person, place, and date / time. Casually groomed, calm, cooperative with good eye contact. No problems with speech. Psychomotor: unremarkable . Mood was depressed and affect was congruent to speech content and full range. Thought process, including associations, was unremarkable and thought content was devoid of suicidal and homicidal ideation and psychotic thought. No hallucinations. Insight was good. Judgment was intact and adequate for safety. Fund of knowledge was intact. Pt demonstrates no obvious problems with attention, concentration, language, recent or remote memory although these were not formally tested.     ASSESSMENT                                                                                                      HISTORICAL:  Initial psych note 9/14/16        NOTES:  Depakote in past didn't like it. Lithium in past metallic past but did work: doesn't want to go on it again. Trazodone: restless leg. Abilify: restless / akathisia. Tegretol rash. Recalls gabapentin years ago ineffective. Rozerem not helping /  ineffective. Ambien he had bad / violent reaction  Jani Langford is a 40 yo male with history of bipolar disorder, PTSD and chem dep.  We tried Remeron with no luck. We tried Topamax and no luck. He saw Dr. Garcia  and started on Mirapex. We tried Belsomra and didn't help.    Our change of Seroquel to Abilify for Jani went well initially. Then he had akathisia and dystonia. Thoughts for future: I was thinking carbamazepine but he had rash in past. Thinking latuda but Jani deosn't want to take that given risk for akathisia. Saphris?  Today we agreed on continue Seroquel 400 mg daily. His depression has been worse lately. We reviewed options and agreed on having him try Lamictal. We reviewed the chance Lucas Gopi rash hence a slow titration and most common SEs.    Controlled substance contract and UDS done / in place.       TREATMENT RISK STATEMENT:  The risks, benefits, alternatives and potential adverse effects have been explained and are understood by the pt.  The pt agrees to the treatment plan with the ability to do so.   The pt knows to call the clinic for any problems or access emergency care if needed.        DIAGNOSES                    PTSD   Bipolar I disorder with history of psychotic features      PLAN                                                                                                                    1)  MEDICATIONS:         --Start Lamictal 25 mg evening for 2 weeks; then increase to 50 mg evening for 2 weeks then increase to 75 mg every evening. Continue Seroquel 400 mg .  continue Klonopin 0.5 mg and Klonopin 1 mg HS   2)  THERAPY:  No Change    3)  LABS:  lipid panel and glucose, LFTs, BMP including fasting glucose were done in May '17 and then in nov '17 as was glucose. EKG 3/2017.      4)  PT MONITOR [call for probs]:  Worsening symptoms, SI/HI, SEs from meds    5)  REFERRALS [CD, medical, other]: sleep referral : has apptmt in next couple weeks    6)  RTC: ~4-6 weeks

## 2020-01-08 ASSESSMENT — ANXIETY QUESTIONNAIRES: GAD7 TOTAL SCORE: 16

## 2020-02-04 DIAGNOSIS — F31.9 BIPOLAR I DISORDER (H): ICD-10-CM

## 2020-02-04 DIAGNOSIS — F41.1 GAD (GENERALIZED ANXIETY DISORDER): ICD-10-CM

## 2020-02-04 RX ORDER — CLONAZEPAM 1 MG/1
1 TABLET ORAL AT BEDTIME
Qty: 30 TABLET | Refills: 0 | Status: SHIPPED | OUTPATIENT
Start: 2020-02-04 | End: 2020-03-02

## 2020-02-04 RX ORDER — CLONAZEPAM 0.5 MG/1
TABLET ORAL
Qty: 30 TABLET | Refills: 0 | Status: SHIPPED | OUTPATIENT
Start: 2020-02-04 | End: 2020-03-02

## 2020-02-04 NOTE — TELEPHONE ENCOUNTER
Klonopin 0.5 and 1 mg -  reviewed   Last visit: 1.7.20  Last refill: 1.6.20    Future appointments:   Next 5 appointments (look out 90 days)    Mar 02, 2020 11:00 AM CST  (Arrive by 10:45 AM)  Return Visit with Renee Vuong MD  [facility] ([facility] ) [address] [place] [id number] [phone]

## 2020-02-20 ENCOUNTER — TELEPHONE (OUTPATIENT)
Dept: FAMILY MEDICINE | Facility: OTHER | Age: 40
End: 2020-02-20

## 2020-02-20 ENCOUNTER — OFFICE VISIT (OUTPATIENT)
Dept: FAMILY MEDICINE | Facility: OTHER | Age: 40
End: 2020-02-20
Attending: PHYSICIAN ASSISTANT
Payer: COMMERCIAL

## 2020-02-20 VITALS
BODY MASS INDEX: 31.02 KG/M2 | DIASTOLIC BLOOD PRESSURE: 86 MMHG | TEMPERATURE: 99.1 F | SYSTOLIC BLOOD PRESSURE: 118 MMHG | HEART RATE: 86 BPM | OXYGEN SATURATION: 97 % | HEIGHT: 72 IN | WEIGHT: 229 LBS

## 2020-02-20 DIAGNOSIS — J11.1 INFLUENZA WITH RESPIRATORY MANIFESTATION OTHER THAN PNEUMONIA: Primary | ICD-10-CM

## 2020-02-20 PROCEDURE — 99213 OFFICE O/P EST LOW 20 MIN: CPT | Performed by: PHYSICIAN ASSISTANT

## 2020-02-20 PROCEDURE — G0463 HOSPITAL OUTPT CLINIC VISIT: HCPCS

## 2020-02-20 RX ORDER — OSELTAMIVIR PHOSPHATE 75 MG/1
75 CAPSULE ORAL 2 TIMES DAILY
Qty: 10 CAPSULE | Refills: 0 | Status: SHIPPED | OUTPATIENT
Start: 2020-02-20 | End: 2020-03-02

## 2020-02-20 ASSESSMENT — MIFFLIN-ST. JEOR: SCORE: 2001.74

## 2020-02-20 ASSESSMENT — PAIN SCALES - GENERAL: PAINLEVEL: NO PAIN (0)

## 2020-02-20 NOTE — TELEPHONE ENCOUNTER
8:28 AM    Reason for Call: OVERBOOK    Patient is having the following symptoms: Sore throat , runny nose  ( Son has influenza ) for  1-2 days    The patient is requesting an appointment for today  with Dr. Lopez    Was an appointment offered for this call?  No    Preferred method for responding to this message: [phone]    If we cannot reach you directly, may we leave a detailed response at the number you provided? Yes      Ghazala Owen

## 2020-02-20 NOTE — PROGRESS NOTES
Subjective     Jani Langford is a 39 year old male who presents to clinic today for the following health issues: Son diagnosed with Influenza B a few days ago.    HPI   RESPIRATORY SYMPTOMS      Duration: yesterday    Description  Rhinorrhea, tickle in throat and PND    Severity: mild    Accompanying signs and symptoms: None    History (predisposing factors):  Son has influenza b    Precipitating or alleviating factors: None    Therapies tried and outcome:  Tylenol cold and flu this morning.         Patient Active Problem List   Diagnosis     Bipolar disorder with psychotic features (H)     Insomnia     History of mental disorder     Attention deficit disorder     Posttraumatic stress disorder     Decreased sex drive     Gastroesophageal reflux disease without esophagitis     Anxiety     Past Surgical History:   Procedure Laterality Date     DENTAL SURGERY      pulled 2 teeth month ago     ENT SURGERY      sinus       Social History     Tobacco Use     Smoking status: Former Smoker     Packs/day: 0.75     Years: 24.00     Pack years: 18.00     Types: Cigarettes, Other     Start date: 1995     Last attempt to quit: 3/12/2019     Years since quittin.9     Smokeless tobacco: Never Used     Tobacco comment: vaping sometimes- noted 20   Substance Use Topics     Alcohol use: Yes     Comment: rare     Family History   Problem Relation Age of Onset     Mental Illness Mother      Depression Mother          Current Outpatient Medications   Medication Sig Dispense Refill     albuterol (PROAIR HFA/PROVENTIL HFA/VENTOLIN HFA) 108 (90 Base) MCG/ACT inhaler Inhale 2 puffs into the lungs every 6 hours 1 Inhaler 0     clonazePAM (KLONOPIN) 0.5 MG tablet TAKE 1 TABLET (0.5 MG) BY MOUTH DAILY AS NEEDED FOR ANXIETY 30 tablet 0     clonazePAM (KLONOPIN) 1 MG tablet TAKE 1 TABLET (1 MG) BY MOUTH AT BEDTIME 30 tablet 0     lamoTRIgine (LAMICTAL) 25 MG tablet Take 1 tablet every evening for 2 weeks; then take 2 tablets every  evening for 2 weeks; then take 3 tablets every evening 90 tablet 3     medical cannabis XX (Patient's own supply) Take by mouth See Admin Instructions (The purpose of this order is to document that the patient reports taking medical cannabis.  This is not a prescription, and is not used to certify that the patient has a qualifying medical condition.)       omeprazole (PRILOSEC) 20 MG DR capsule TAKE 1 CAPSULE BY MOUTH EVERY DAY 30 capsule 2     oseltamivir 75 MG PO capsule Take 1 capsule (75 mg) by mouth 2 times daily for 5 days 10 capsule 0     QUEtiapine ER (SEROQUEL XR) 400 MG 24 hr tablet TAKE 1 TABLET BY MOUTH NIGHTLY AT BEDTIME 30 tablet 6     Allergies   Allergen Reactions     Abilify [Aripiprazole]      Side effects     Bee Venom      Latex Hives     Percocet [Oxycodone-Acetaminophen]      Short of breath vomiting     Toradol      seizure     Tramadol      Short of breath and vomiting     Tegretol [Carbamazepine] Rash         Reviewed and updated as needed this visit by Provider         Review of Systems   ROS COMP: Constitutional, HEENT, cardiovascular, pulmonary, gi and gu systems are negative, except as otherwise noted.      Objective    /86   Pulse 86   Temp 99.1  F (37.3  C)   Ht 1.829 m (6')   Wt 103.9 kg (229 lb)   SpO2 97%   BMI 31.06 kg/m    Body mass index is 31.06 kg/m .  Physical Exam   Physical Exam:  Constitutional: healthy, alert and no acute distress  Skin: No suspicious rash or skin lesion  ENT: Posterior pharynx moist and pink without edema or exudate.  EAC's clear. Right TM intact. Left TM intact  CV: RRR. No murmur  Pulm: Lungs clear to auscultation without wheeze, rales or rhonchi  Psych: mentation and affect appear normal                Assessment & Plan     (J11.1) Influenza with respiratory manifestation other than pneumonia  (primary encounter diagnosis)  Comment: No indication to test with exposure and symptoms  Plan: oseltamivir 75 MG PO capsule                   Rest,  increase fluids, Tylenol for fever or discomfort. Return to clinic if symptoms persist or worsen.      No follow-ups on file.    LOW Montelongo  [facility]

## 2020-02-20 NOTE — NURSING NOTE
Chief Complaint   Patient presents with     URI       Initial /86   Pulse 86   Temp 99.1  F (37.3  C)   Ht 1.829 m (6')   Wt 103.9 kg (229 lb)   SpO2 97%   BMI 31.06 kg/m   Estimated body mass index is 31.06 kg/m  as calculated from the following:    Height as of this encounter: 1.829 m (6').    Weight as of this encounter: 103.9 kg (229 lb).  Medication Reconciliation: complete  Laura Loza

## 2020-03-02 ENCOUNTER — OFFICE VISIT (OUTPATIENT)
Dept: PSYCHIATRY | Facility: OTHER | Age: 40
End: 2020-03-02
Attending: PSYCHIATRY & NEUROLOGY
Payer: COMMERCIAL

## 2020-03-02 VITALS
HEIGHT: 72 IN | SYSTOLIC BLOOD PRESSURE: 118 MMHG | WEIGHT: 229 LBS | DIASTOLIC BLOOD PRESSURE: 74 MMHG | OXYGEN SATURATION: 94 % | HEART RATE: 101 BPM | TEMPERATURE: 98.2 F | BODY MASS INDEX: 31.02 KG/M2

## 2020-03-02 DIAGNOSIS — F41.1 GAD (GENERALIZED ANXIETY DISORDER): ICD-10-CM

## 2020-03-02 DIAGNOSIS — F31.9 BIPOLAR I DISORDER (H): ICD-10-CM

## 2020-03-02 PROCEDURE — 99214 OFFICE O/P EST MOD 30 MIN: CPT | Performed by: PSYCHIATRY & NEUROLOGY

## 2020-03-02 PROCEDURE — G0463 HOSPITAL OUTPT CLINIC VISIT: HCPCS

## 2020-03-02 RX ORDER — CLONAZEPAM 0.5 MG/1
TABLET ORAL
Qty: 30 TABLET | Refills: 0 | Status: SHIPPED | OUTPATIENT
Start: 2020-03-02 | End: 2020-04-24

## 2020-03-02 RX ORDER — CLONAZEPAM 1 MG/1
1 TABLET ORAL AT BEDTIME
Qty: 30 TABLET | Refills: 0 | Status: SHIPPED | OUTPATIENT
Start: 2020-03-02 | End: 2020-04-24

## 2020-03-02 ASSESSMENT — PATIENT HEALTH QUESTIONNAIRE - PHQ9
SUM OF ALL RESPONSES TO PHQ QUESTIONS 1-9: 6
5. POOR APPETITE OR OVEREATING: NOT AT ALL

## 2020-03-02 ASSESSMENT — ANXIETY QUESTIONNAIRES
2. NOT BEING ABLE TO STOP OR CONTROL WORRYING: NOT AT ALL
1. FEELING NERVOUS, ANXIOUS, OR ON EDGE: NOT AT ALL
7. FEELING AFRAID AS IF SOMETHING AWFUL MIGHT HAPPEN: NOT AT ALL
3. WORRYING TOO MUCH ABOUT DIFFERENT THINGS: NOT AT ALL
6. BECOMING EASILY ANNOYED OR IRRITABLE: NOT AT ALL
5. BEING SO RESTLESS THAT IT IS HARD TO SIT STILL: NOT AT ALL
GAD7 TOTAL SCORE: 0
IF YOU CHECKED OFF ANY PROBLEMS ON THIS QUESTIONNAIRE, HOW DIFFICULT HAVE THESE PROBLEMS MADE IT FOR YOU TO DO YOUR WORK, TAKE CARE OF THINGS AT HOME, OR GET ALONG WITH OTHER PEOPLE: NOT DIFFICULT AT ALL

## 2020-03-02 ASSESSMENT — PAIN SCALES - GENERAL: PAINLEVEL: NO PAIN (0)

## 2020-03-02 ASSESSMENT — MIFFLIN-ST. JEOR: SCORE: 2001.74

## 2020-03-02 NOTE — PROGRESS NOTES
"  PSYCHIATRY CLINIC PROGRESS NOTE   20 minute medication management, more than 50% of time spent counseling patient on medications, medication side effects, symptom history and management   SUBJECTIVE / INTERIM HISTORY                                                                        Social- with ISABEL Sandhu and her kids Children- Phoebe's kids are 5, 7 , and 12 yo. Ozzy is the 15 yo Wiener dog   Last visit:     - medical card. He is doing better. Helps with anxiety and notes \"there's been none!\" mood better too. Sleep improved a bit.  - given doing better would like to go down / potentially off meds. For one would like to go down on dose Klonopin.  - their one kiddo still having major issues to point kid is peeing in their room. He likely is going to go to treatment [place]. 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    SUBSTANCE USE- smokes 1/2 PPD since age 17 yo.  past use  Meth: ~2 + years clean. , MJ. TX: [place] and [employer]. EtOH now rare.    SYMPTOMS-  Mood has been better. No . No manic sx.  nightmares, flashbacks, detached, angry outbursts, self-destructive, startle response, hypervigilance and fear Sleep now: about 2-3 hours then up for awhile, then another hour. His goal would be 6 continuous.  MEDICAL ROS-  appetite increased (in relation to Seroquel)  SOB: usually at night. But sometimes during day as well not even with exertion.  increased appetite / weight  MEDICAL / SURGICAL HISTORY                   Patient Active Problem List   Diagnosis     Bipolar disorder with psychotic features (H)     Insomnia     History of mental disorder     Attention deficit disorder     Posttraumatic stress disorder     Decreased sex drive     Gastroesophageal reflux " "disease without esophagitis     Anxiety     ALLERGY   Abilify [aripiprazole]; Bee venom; Latex; Percocet [oxycodone-acetaminophen]; Toradol; Tramadol; and Tegretol [carbamazepine]  MEDICATIONS                                                                                             Current Outpatient Medications   Medication Sig     albuterol (PROAIR HFA/PROVENTIL HFA/VENTOLIN HFA) 108 (90 Base) MCG/ACT inhaler Inhale 2 puffs into the lungs every 6 hours     clonazePAM (KLONOPIN) 0.5 MG tablet TAKE 1 TABLET (0.5 MG) BY MOUTH DAILY AS NEEDED FOR ANXIETY     clonazePAM (KLONOPIN) 1 MG tablet TAKE 1 TABLET (1 MG) BY MOUTH AT BEDTIME     lamoTRIgine (LAMICTAL) 25 MG tablet Take 1 tablet every evening for 2 weeks; then take 2 tablets every evening for 2 weeks; then take 3 tablets every evening     medical cannabis XX (Patient's own supply) Take by mouth See Admin Instructions (The purpose of this order is to document that the patient reports taking medical cannabis.  This is not a prescription, and is not used to certify that the patient has a qualifying medical condition.)     omeprazole (PRILOSEC) 20 MG DR capsule TAKE 1 CAPSULE BY MOUTH EVERY DAY     QUEtiapine ER (SEROQUEL XR) 400 MG 24 hr tablet TAKE 1 TABLET BY MOUTH NIGHTLY AT BEDTIME     No current facility-administered medications for this visit.          PAST MEDICATION TRIALS    Prozac (fluoxetine), Zoloft (sertraline), Paxil (paroxetine) and Cymbalta (duloxetine). Paxil sexual SEs and constipation. Mirtazapine ineffective for insomnia.   Elavil (amitriptyline).   Lithium Carbonate, Depakote and Depakot ER (valproate) and Neurontin. Topamax didn't help (gabapentin). Lithium \"that was like eating pennies and nickels\" but helped / effective. Didn't like the blood level draws with lithium. Doesn't think has been on tegretol or trileptal.   Seroquel (quetiapine)., Abilify   no older antipsychotics.   Xanax (alprazolam). Doesn't think has been on Buspar   Ambien " (zolpidem) and Lunesta (eszopiclone). Lunesta helped initial insomnia, NOT middle. Ambien made him violent and agitated.       VITALS   /74 (BP Location: Left arm, Patient Position: Sitting, Cuff Size: Adult Regular)   Pulse 101   Temp 98.2  F (36.8  C) (Tympanic)   Ht 1.829 m (6')   Wt 103.9 kg (229 lb)   SpO2 94%   BMI 31.06 kg/m       PHQ9                     [unfilled]  LABS                                                                                                                           Recent Labs   Lab Test  05/21/18   0856  11/22/17   0914   CHOL  203*  216*   HDL  24*  25*   LDL  142*  132*   TRIG  184*  295*     Lab Results   Component Value Date    TSH 1.27 05/21/2018     Last Basic Metabolic Panel:  Lab Results   Component Value Date     05/21/2018      Lab Results   Component Value Date    POTASSIUM 3.8 05/21/2018     Lab Results   Component Value Date    CHLORIDE 103 05/21/2018     Lab Results   Component Value Date    TOO 9.7 05/21/2018     Lab Results   Component Value Date    CO2 29 05/21/2018     Lab Results   Component Value Date    BUN 8 05/21/2018     Lab Results   Component Value Date    CR 1.09 05/21/2018     Lab Results   Component Value Date    GLC 81 05/21/2018        ms (on Seroquel) March 2017    MENTAL STATUS EXAM                                                                                        Alert. Oriented to person, place, and date / time. Casually groomed, calm, cooperative with good eye contact. No problems with speech. Psychomotor: unremarkable . Mood was depressed and affect was congruent to speech content and full range. Thought process, including associations, was unremarkable and thought content was devoid of suicidal and homicidal ideation and psychotic thought. No hallucinations. Insight was good. Judgment was intact and adequate for safety. Fund of knowledge was intact. Pt demonstrates no obvious problems with attention,  concentration, language, recent or remote memory although these were not formally tested.     ASSESSMENT                                                                                                      HISTORICAL:  Initial psych note 9/14/16        NOTES:  Depakote in past didn't like it. Lithium in past metallic past but did work: doesn't want to go on it again. Trazodone: restless leg. Abilify: restless / akathisia. Tegretol rash. Recalls gabapentin years ago ineffective. Rozerem not helping / ineffective. Ambien he had bad / violent reaction  Jani Langford is a 40 yo male with history of bipolar disorder, PTSD and chem dep.  We tried Remeron with no luck. We tried Topamax and no luck. He saw Dr. Garcia  and started on Mirapex. We tried Belsomra and didn't help.    Our change of Seroquel to Abilify for Jani went well initially. Then he had akathisia and dystonia. Thoughts for future: I was thinking carbamazepine but he had rash in past. Thinking latuda but Jani deosn't want to take that given risk for akathisia. Saphris?  Today we agreed on continue Seroquel 400 mg daily. Given he is feeling better with medical marijuana we are going to try discontinuing Lamictal of which he is taking 50 mgdaily. Noted I don't think we need to taper to 25 mg before discontinuiing. That being said if he feels any sx of withdrawal he knows he can take 25 mg daily for week then discontinue.    Controlled substance contract and UDS done / in place. We reviewed and signed neuroleptic consent form given he is taking Seroquel.       TREATMENT RISK STATEMENT:  The risks, benefits, alternatives and potential adverse effects have been explained and are understood by the pt.  The pt agrees to the treatment plan with the ability to do so.   The pt knows to call the clinic for any problems or access emergency care if needed.        DIAGNOSES                    PTSD   Bipolar I disorder with history of psychotic features      PLAN                                                                                                                     1)  MEDICATIONS:         --He is taking Lamictal 50 mg daily (not 75 mg daily). We are discontinuing Lamictal 50 mg daily.  Continue Seroquel 400 mg .  continue Klonopin 0.5 mg and Klonopin 1 mg HS and we will leave for now as written. His goal however is he is going to try start going down and frequency and potentially off.   2)  THERAPY:  No Change    3)  LABS:  lipid panel and glucose, LFTs, BMP including fasting glucose were done in May '17 and then in nov '17 as was glucose. EKG 3/2017.      4)  PT MONITOR [call for probs]:  Worsening symptoms, SI/HI, SEs from meds    5)  REFERRALS [CD, medical, other]: sleep referral : has apptmt in next couple weeks    6)  RTC: ~2 months

## 2020-03-02 NOTE — NURSING NOTE
Chief Complaint   Patient presents with     RECHECK     Mental health.  Medication review.  Discuss safe way to get off Lamictal.  Decrease Klonopin.       Initial /74 (BP Location: Left arm, Patient Position: Sitting, Cuff Size: Adult Regular)   Pulse 101   Temp 98.2  F (36.8  C) (Tympanic)   Ht 1.829 m (6')   Wt 103.9 kg (229 lb)   SpO2 94%   BMI 31.06 kg/m   Estimated body mass index is 31.06 kg/m  as calculated from the following:    Height as of this encounter: 1.829 m (6').    Weight as of this encounter: 103.9 kg (229 lb).  Medication Reconciliation: complete  KIERAN GARCIA LPN

## 2020-03-03 ASSESSMENT — ANXIETY QUESTIONNAIRES: GAD7 TOTAL SCORE: 0

## 2020-03-04 DIAGNOSIS — F31.9 BIPOLAR I DISORDER (H): ICD-10-CM

## 2020-03-04 RX ORDER — CLONAZEPAM 1 MG/1
1 TABLET ORAL AT BEDTIME
Qty: 30 TABLET | Refills: 0 | OUTPATIENT
Start: 2020-03-04

## 2020-04-24 ENCOUNTER — HOSPITAL ENCOUNTER (EMERGENCY)
Facility: HOSPITAL | Age: 40
Discharge: HOME | End: 2020-04-24
Attending: NURSE PRACTITIONER | Admitting: NURSE PRACTITIONER
Payer: COMMERCIAL

## 2020-04-24 ENCOUNTER — APPOINTMENT (OUTPATIENT)
Dept: GENERAL RADIOLOGY | Facility: HOSPITAL | Age: 40
End: 2020-04-24
Attending: NURSE PRACTITIONER
Payer: COMMERCIAL

## 2020-04-24 VITALS
RESPIRATION RATE: 16 BRPM | SYSTOLIC BLOOD PRESSURE: 115 MMHG | WEIGHT: 228.8 LBS | HEIGHT: 73 IN | DIASTOLIC BLOOD PRESSURE: 82 MMHG | TEMPERATURE: 96.8 F | BODY MASS INDEX: 30.32 KG/M2

## 2020-04-24 DIAGNOSIS — S69.90XA HAND INJURY: Primary | ICD-10-CM

## 2020-04-24 PROCEDURE — 73130 X-RAY EXAM OF HAND: CPT | Mod: TC,RT

## 2020-04-24 PROCEDURE — G0463 HOSPITAL OUTPT CLINIC VISIT: HCPCS

## 2020-04-24 PROCEDURE — 99213 OFFICE O/P EST LOW 20 MIN: CPT | Mod: Z6 | Performed by: NURSE PRACTITIONER

## 2020-04-24 ASSESSMENT — ENCOUNTER SYMPTOMS
MYALGIAS: 1
JOINT SWELLING: 1
ARTHRALGIAS: 1

## 2020-04-24 ASSESSMENT — MIFFLIN-ST. JEOR: SCORE: 2016.71

## 2020-04-24 NOTE — ED NOTES
Patient discharged with understanding of instructions and recommendations.   Denies any questions or concerns.     Lara Ludwig LPN on 4/24/2020 at 10:00 AM

## 2020-04-24 NOTE — DISCHARGE INSTRUCTIONS
Clean hand thoroughly with soap and water.  Can put triple antibiotic cream over wound.  Use Ace wrap as needed.  Take Tylenol or ibuprofen as needed for pain.  Take antibiotic as prescribed until finished.    Follow-up with your doctor as needed if no improvement in symptoms.    Return to emergency department for worsening or concerning symptoms.
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
## 2020-04-24 NOTE — ED AVS SNAPSHOT
[facility]  [address] [address]  Phone:  [phone]                                    Jani Langford   MRN: [id number]    Department:  [facility]   Date of Visit:  4/24/2020           After Visit Summary Signature Page    I have received my discharge instructions, and my questions have been answered. I have discussed any challenges I see with this plan with the nurse or doctor.    ..........................................................................................................................................  Patient/Patient Representative Signature      ..........................................................................................................................................  Patient Representative Print Name and Relationship to Patient    ..................................................               ................................................  Date                                   Time    ..........................................................................................................................................  Reviewed by Signature/Title    ...................................................              ..............................................  Date                                               Time          22EPIC Rev 08/18

## 2020-05-04 ENCOUNTER — VIRTUAL VISIT (OUTPATIENT)
Dept: PSYCHIATRY | Facility: OTHER | Age: 40
End: 2020-05-04
Attending: PSYCHIATRY & NEUROLOGY
Payer: COMMERCIAL

## 2020-05-04 DIAGNOSIS — F31.9 BIPOLAR I DISORDER (H): ICD-10-CM

## 2020-05-04 PROCEDURE — 99213 OFFICE O/P EST LOW 20 MIN: CPT | Mod: 95 | Performed by: PSYCHIATRY & NEUROLOGY

## 2020-05-04 RX ORDER — QUETIAPINE 400 MG/1
400 TABLET, FILM COATED, EXTENDED RELEASE ORAL AT BEDTIME
Qty: 30 TABLET | Refills: 6 | Status: SHIPPED | OUTPATIENT
Start: 2020-05-04 | End: 2020-12-01

## 2020-05-04 ASSESSMENT — ANXIETY QUESTIONNAIRES
2. NOT BEING ABLE TO STOP OR CONTROL WORRYING: NOT AT ALL
5. BEING SO RESTLESS THAT IT IS HARD TO SIT STILL: NOT AT ALL
1. FEELING NERVOUS, ANXIOUS, OR ON EDGE: NEARLY EVERY DAY
3. WORRYING TOO MUCH ABOUT DIFFERENT THINGS: NEARLY EVERY DAY
7. FEELING AFRAID AS IF SOMETHING AWFUL MIGHT HAPPEN: NOT AT ALL
GAD7 TOTAL SCORE: 8
6. BECOMING EASILY ANNOYED OR IRRITABLE: SEVERAL DAYS

## 2020-05-04 ASSESSMENT — PATIENT HEALTH QUESTIONNAIRE - PHQ9
SUM OF ALL RESPONSES TO PHQ QUESTIONS 1-9: 8
5. POOR APPETITE OR OVEREATING: SEVERAL DAYS

## 2020-05-04 NOTE — NURSING NOTE
"Chief Complaint   Patient presents with     RECHECK     Telephone visit.       Initial There were no vitals taken for this visit. Estimated body mass index is 30.19 kg/m  as calculated from the following:    Height as of 4/24/20: 1.854 m (6' 1\").    Weight as of 4/24/20: 103.8 kg (228 lb 12.8 oz).  Medication Reconciliation: complete  KIERAN GARCIA LPN    "

## 2020-05-04 NOTE — PROGRESS NOTES
"Jani Langford is a 39 year old male who is being evaluated via a billable telephone visit.      The patient has been notified of following:     \"This telephone visit will be conducted via a call between you and your physician/provider. We have found that certain health care needs can be provided without the need for a physical exam.  This service lets us provide the care you need with a short phone conversation.  If a prescription is necessary we can send it directly to your pharmacy.  If lab work is needed we can place an order for that and you can then stop by our lab to have the test done at a later time.    Telephone visits are billed at different rates depending on your insurance coverage. During this emergency period, for some insurers they may be billed the same as an in-person visit.  Please reach out to your insurance provider with any questions.    If during the course of the call the physician/provider feels a telephone visit is not appropriate, you will not be charged for this service.\"    Patient has given verbal consent for Telephone visit?  Yes    How would you like to obtain your AVS? Lela    Phone call duration: 17 minutes        SUBJECTIVE / INTERIM HISTORY                                                                        Social- with ISABEL Sandhu and her kids Children- Phoebe's kids are 5, 7 , and 10 yo. Ozzy is the 15 yo Wiener dog   Last visit: Continue Seroquel 400 mg .  continue Klonopin 0.5 mg and Klonopin 1 mg HS and we will leave for now as written. His goal however is he is going to try start going down and frequency and potentially off.     - A \"manav\" was in his yard digging through their stuff and scared the crap of their little girl! She came running in noting she saw the ciaran in their yard  - medical card. The Oral suspension is helping him.   - he and Phoebe both doing okay.   - Things have been getting a bit better. Even sleep \"is a tad bit better\". Anxiety and panic attacks \"are " "basically nil\"   - hasn't \"touched a Klonopin\" in like a month. Hasn't needed to.   - their one kiddo still having major issues to point kid is peeing in their room. He likely is going to go to treatment [place]. He did start working with therapist at [facility]  - generally doing okay including he and Phoebe in terms of their relationship  - kid's tested and Kirit 8 yo dx with impulse disorder type and Vanessa with PTSD  - has been dx PTSD in past: when was working with Bernabe Nallely.....    SUBSTANCE USE- smokes 1/2 PPD since age 15 yo.  past use  Meth: ~2 + years clean. , MJ. TX: [place] and [facility]. EtOH now rare.    SYMPTOMS-  Mood has been better. No . No manic sx.  nightmares, flashbacks, detached, angry outbursts, self-destructive, startle response, hypervigilance and fear Sleep now: about 2-3 hours then up for awhile, then another hour. His goal would be 6 continuous.  MEDICAL ROS-  appetite increased (in relation to Seroquel)  SOB: usually at night. But sometimes during day as well not even with exertion.  increased appetite / weight  MEDICAL / SURGICAL HISTORY                   Patient Active Problem List   Diagnosis     Bipolar disorder with psychotic features (H)     Insomnia     History of mental disorder     Attention deficit disorder     Posttraumatic stress disorder     Decreased sex drive     Gastroesophageal reflux disease without esophagitis     Anxiety     ALLERGY   Abilify [aripiprazole]; Bee venom; Latex; Percocet [oxycodone-acetaminophen]; Toradol; Tramadol; and Tegretol [carbamazepine]  MEDICATIONS                                                                                             Current Outpatient Medications   Medication Sig     albuterol (PROAIR HFA/PROVENTIL HFA/VENTOLIN HFA) 108 (90 Base) MCG/ACT inhaler Inhale 2 puffs into the lungs every 6 hours     medical cannabis XX (Patient's own supply) Take by mouth See Admin Instructions From [facility]     omeprazole " "(PRILOSEC) 20 MG DR capsule TAKE 1 CAPSULE BY MOUTH EVERY DAY     QUEtiapine ER (SEROQUEL XR) 400 MG 24 hr tablet TAKE 1 TABLET BY MOUTH NIGHTLY AT BEDTIME     No current facility-administered medications for this visit.          PAST MEDICATION TRIALS    Prozac (fluoxetine), Zoloft (sertraline), Paxil (paroxetine) and Cymbalta (duloxetine). Paxil sexual SEs and constipation. Mirtazapine ineffective for insomnia.   Elavil (amitriptyline).   Lithium Carbonate, Depakote and Depakot ER (valproate) and Neurontin. Topamax didn't help (gabapentin). Lithium \"that was like eating pennies and nickels\" but helped / effective. Didn't like the blood level draws with lithium. Doesn't think has been on tegretol or trileptal.   Seroquel (quetiapine)., Abilify   no older antipsychotics.   Xanax (alprazolam). Doesn't think has been on Buspar   Ambien (zolpidem) and Lunesta (eszopiclone). Lunesta helped initial insomnia, NOT middle. Ambien made him violent and agitated.       VITALS   There were no vitals taken for this visit.     PHQ9                     [unfilled]  LABS                                                                                                                           Recent Labs   Lab Test  05/21/18   0856  11/22/17   0914   CHOL  203*  216*   HDL  24*  25*   LDL  142*  132*   TRIG  184*  295*     Lab Results   Component Value Date    TSH 1.27 05/21/2018     Last Basic Metabolic Panel:  Lab Results   Component Value Date     05/21/2018      Lab Results   Component Value Date    POTASSIUM 3.8 05/21/2018     Lab Results   Component Value Date    CHLORIDE 103 05/21/2018     Lab Results   Component Value Date    TOO 9.7 05/21/2018     Lab Results   Component Value Date    CO2 29 05/21/2018     Lab Results   Component Value Date    BUN 8 05/21/2018     Lab Results   Component Value Date    CR 1.09 05/21/2018     Lab Results   Component Value Date    GLC 81 05/21/2018        ms (on Seroquel) " March 2017    MENTAL STATUS EXAM                                                                                        Mood was euthymic.  Speech normal volume, rhythm, rate.  Thought process, including associations, was unremarkable and thought content was devoid of suicidal and homicidal ideation and psychotic thought. No hallucinations. Insight was good. Judgment was intact and adequate for safety. Fund of knowledge was intact. Pt demonstrates no obvious problems with attention, concentration, language, recent or remote memory although these were not formally tested.     ASSESSMENT                                                                                                      HISTORICAL:  Initial psych note 9/14/16        NOTES:  Depakote in past didn't like it. Lithium in past metallic past but did work: doesn't want to go on it again. Trazodone: restless leg. Abilify: restless / akathisia. Tegretol rash. Recalls gabapentin years ago ineffective. Rozerem not helping / ineffective. Ambien he had bad / violent reaction  Jani Langford is a 38 yo male with history of bipolar disorder, PTSD and chem dep.  We tried Remeron with no luck. We tried Topamax and no luck. He saw Dr. Garcia  and started on Mirapex. We tried Belsomra and didn't help.    Our change of Seroquel to Abilify for Jani went well initially. Then he had akathisia and dystonia. Thoughts for future: I was thinking carbamazepine but he had rash in past. Thinking latuda but Jani doesn't  want to take that given risk for akathisia. Saphris    Today we agreed on continue Seroquel 400 mg daily. Given he is feeling better with medical marijuana we LAST visit discontinued Lamictal 50 mg daily. This is about best I think Jani has done. Glad to hear it. He hasn't been taking Klonopin but has some left if he feels has bad day with anxiety / panic. I noted today we may want to try decreasing Seroquel dose in future if he keeps on this track.      Controlled substance contract and UDS done / in place. We reviewed and signed neuroleptic consent form given he is taking Seroquel.       TREATMENT RISK STATEMENT:  The risks, benefits, alternatives and potential adverse effects have been explained and are understood by the pt.  The pt agrees to the treatment plan with the ability to do so.   The pt knows to call the clinic for any problems or access emergency care if needed.        DIAGNOSES                    PTSD   Bipolar I disorder with history of psychotic features      PLAN                                                                                                                    1)  MEDICATIONS:         -- Continue Seroquel 400 mg. He's not taking Klonopin 0.5 mg and Klonopin 1 mg HS but has some left.   2)  THERAPY:  No Change    3)  LABS:  lipid panel and glucose, LFTs, BMP including fasting glucose were done in May '17 and then in nov '17 as was glucose. EKG 3/2017.      4)  PT MONITOR [call for probs]:  Worsening symptoms, SI/HI, SEs from meds    5)  REFERRALS [CD, medical, other]: sleep referral : has apptmt in next couple weeks    6)  RTC: ~2 months

## 2020-05-05 ASSESSMENT — ANXIETY QUESTIONNAIRES: GAD7 TOTAL SCORE: 8

## 2020-11-30 DIAGNOSIS — F31.9 BIPOLAR I DISORDER (H): ICD-10-CM

## 2020-12-01 RX ORDER — QUETIAPINE 400 MG/1
400 TABLET, FILM COATED, EXTENDED RELEASE ORAL AT BEDTIME
Qty: 30 TABLET | Refills: 6 | Status: SHIPPED | OUTPATIENT
Start: 2020-12-01 | End: 2021-08-04

## 2020-12-01 NOTE — TELEPHONE ENCOUNTER
Seroquel       Last Written Prescription Date:  5-4-2020  Last Fill Quantity: 30,   # refills: 6  Last Office Visit: 5-4-2020  Future Office visit:

## 2020-12-21 ENCOUNTER — NURSE TRIAGE (OUTPATIENT)
Dept: FAMILY MEDICINE | Facility: OTHER | Age: 40
End: 2020-12-21

## 2020-12-21 NOTE — PROGRESS NOTES
"Subjective     Jani Langford is a 40 year old male who presents to clinic today for the following health issues:    HPI         Rash      Duration: 4 days    Description  Location: left arm and leg  Itching: moderate    Intensity:  moderate    Accompanying signs and symptoms: itchy    History (similar episodes/previous evaluation): None    Precipitating or alleviating factors:  New exposures:  None  Recent travel: no      Therapies tried and outcome: Benadryl/diphenhydramine -  not effective       Very itchy.  No new exposure.  No illness.      Review of Systems   Constitutional, HEENT, cardiovascular, pulmonary, gi and gu systems are negative, except as otherwise noted.      Objective    /62   Pulse 85   Temp 98.8  F (37.1  C)   Wt 105.7 kg (233 lb)   SpO2 94%   BMI 30.74 kg/m    Body mass index is 30.74 kg/m .  Physical Exam   GENERAL: healthy, alert and no distress  MS: no gross musculoskeletal defects noted, no edema  SKIN: dermatitis rash on both sides and also prominent in the left popliteal fossa.              Assessment & Plan     Dermatitis  Likely eczema given the popliteal fossa component.  Oral and topical steroid and f/u with ongoing concerns.  He will try and see if anything is new but so far nothing comes to mind.    - predniSONE (DELTASONE) 20 MG tablet; Take 1 tablet (20 mg) by mouth 2 times daily for 5 days  - triamcinolone (ARISTOCORT HP) 0.5 % external cream; Apply topically 2 times daily     BMI:   Estimated body mass index is 30.74 kg/m  as calculated from the following:    Height as of 4/24/20: 1.854 m (6' 1\").    Weight as of this encounter: 105.7 kg (233 lb).                No follow-ups on file.    Selwyn Lopez MD  [facility]    "

## 2020-12-21 NOTE — TELEPHONE ENCOUNTER
Rash on arm, left side of bicep. Pt has been taking benadryl tablets at night and applying benadryl cream to the area. Experiencing Itching and the rash has spread down his arm.      Mild relief from the benadryl. Patient inquiring if there is anything else that Dr. Lopez can prescribe, or if patient could come in to be seen today.     Patient can be reached at [phone]    Please advise.         Additional Information    Negative: Sounds like a life-threatening emergency to the triager    Negative: Possible contact with poison ivy or oak    Negative: Insect bite(s) suspected    Negative: Athlete's Foot suspected (i.e., itchy rash between the toes)    Negative: Jock Itch suspected (i.e., itchy rash on inner thighs near genital area)    Negative: Wound infection suspected (i.e., pain, spreading redness, or pus; in a cut, puncture, scrape or sutured wound)    Negative: Rash of external female genital area (vulva)    Negative: Rash of penis or scrotum    Negative: Small spot, skin growth, or mole    Negative: Fever and localized purple or blood-colored spots or dots that are not from injury or friction    Negative: Fever and localized rash is very painful    Negative: Patient sounds very sick or weak to the triager    Negative: Looks like a boil, infected sore, deep ulcer, or other infected rash (spreading redness, pus)    Negative: Painful rash with multiple small blisters grouped together (i.e., dermatomal distribution or 'band' or 'stripe')    Negative: Localized rash is very painful (no fever)    Negative: Localized purple or blood-colored spots or dots that are not from injury or friction (no fever)    Negative: Lyme disease suspected (e.g., bull's-eye rash or tick bite / exposure)    Negative: Patient wants to be seen    Negative: Tender bumps in armpits    Negative: Pimples (localized) and no improvement after using CARE ADVICE    Negative: SEVERE local itching persists after 2 days of steroid cream     "Negative: Applying cream or ointment and it causes severe itch, burning, or pain    Negative: Localized rash present > 7 days    Negative: Red, moist, irritated area between skin folds (or under larger breasts)    Negative: Localized area of skin darkening or thickening on lower legs or ankles and has NOT been evaluated by a doctor (or NP/PA)    Mild localized rash    Answer Assessment - Initial Assessment Questions  1. APPEARANCE of RASH: \"Describe the rash.\"       Red spots    2. LOCATION: \"Where is the rash located?\"       Left side of bicep    3. NUMBER: \"How many spots are there?\"       20 plus spots     4. SIZE: \"How big are the spots?\" (Inches, centimeters or compare to size of a coin)       Dime size     5. ONSET: \"When did the rash start?\"       3-4 days    6. ITCHING: \"Does the rash itch?\" If so, ask: \"How bad is the itch?\"  (Scale 1-10; or mild, moderate, severe)      Moderate     7. PAIN: \"Does the rash hurt?\" If so, ask: \"How bad is the pain?\"  (Scale 1-10; or mild, moderate, severe)      No     8. OTHER SYMPTOMS: \"Do you have any other symptoms?\" (e.g., fever)      No     9. PREGNANCY: \"Is there any chance you are pregnant?\" \"When was your last menstrual period?\"      Na    Protocols used: RASH OR REDNESS - [id number]      "

## 2020-12-22 ENCOUNTER — OFFICE VISIT (OUTPATIENT)
Dept: FAMILY MEDICINE | Facility: OTHER | Age: 40
End: 2020-12-22
Attending: FAMILY MEDICINE
Payer: COMMERCIAL

## 2020-12-22 VITALS
OXYGEN SATURATION: 94 % | SYSTOLIC BLOOD PRESSURE: 108 MMHG | HEART RATE: 85 BPM | BODY MASS INDEX: 30.74 KG/M2 | TEMPERATURE: 98.8 F | DIASTOLIC BLOOD PRESSURE: 62 MMHG | WEIGHT: 233 LBS

## 2020-12-22 DIAGNOSIS — L30.9 DERMATITIS: Primary | ICD-10-CM

## 2020-12-22 PROCEDURE — 99213 OFFICE O/P EST LOW 20 MIN: CPT | Performed by: FAMILY MEDICINE

## 2020-12-22 RX ORDER — TRIAMCINOLONE ACETONIDE 5 MG/G
CREAM TOPICAL 2 TIMES DAILY
Qty: 454 G | Refills: 0 | Status: SHIPPED | OUTPATIENT
Start: 2020-12-22

## 2020-12-22 RX ORDER — PREDNISONE 20 MG/1
20 TABLET ORAL 2 TIMES DAILY
Qty: 10 TABLET | Refills: 0 | Status: SHIPPED | OUTPATIENT
Start: 2020-12-22 | End: 2020-12-27

## 2020-12-22 ASSESSMENT — PAIN SCALES - GENERAL: PAINLEVEL: NO PAIN (0)

## 2020-12-22 NOTE — NURSING NOTE
"Chief Complaint   Patient presents with     Rash       Initial /62   Pulse 85   Temp 98.8  F (37.1  C)   Wt 105.7 kg (233 lb)   SpO2 94%   BMI 30.74 kg/m   Estimated body mass index is 30.74 kg/m  as calculated from the following:    Height as of 4/24/20: 1.854 m (6' 1\").    Weight as of this encounter: 105.7 kg (233 lb).  Medication Reconciliation: complete  Rubina Cobian LPN  "

## 2021-01-19 ENCOUNTER — TELEPHONE (OUTPATIENT)
Dept: FAMILY MEDICINE | Facility: OTHER | Age: 41
End: 2021-01-19

## 2021-01-19 DIAGNOSIS — L30.9 ECZEMA, UNSPECIFIED TYPE: Primary | ICD-10-CM

## 2021-01-19 NOTE — TELEPHONE ENCOUNTER
Pt states he has dry flaky eczema patches on his face and around his eyes x 2 days. Pt has triamcinolone cream, but it states not to put around eyes.  Please advise.

## 2021-01-20 RX ORDER — TACROLIMUS 1 MG/G
OINTMENT TOPICAL 2 TIMES DAILY
Qty: 60 G | Refills: 0 | Status: SHIPPED | OUTPATIENT
Start: 2021-01-20 | End: 2022-02-23

## 2021-01-20 NOTE — TELEPHONE ENCOUNTER
Plain lotion should be used as well, hypoallergenic (vanicream, eucerin, cetaphil-the more spendy ones generally).      protopic can be used around the eyes and sent (if insurance covers-the computer says it does).   Thank.dipesh Lopez MD

## 2021-01-22 ENCOUNTER — TELEPHONE (OUTPATIENT)
Dept: FAMILY MEDICINE | Facility: OTHER | Age: 41
End: 2021-01-22

## 2021-01-22 DIAGNOSIS — L30.9 ECZEMA, UNSPECIFIED TYPE: Primary | ICD-10-CM

## 2021-01-22 NOTE — TELEPHONE ENCOUNTER
PA Initiation - This request was initiated by the Central Prior Auth Team. If you have any questions, we can be reached at [phone]    Medication: TACROLIMUS 0.1% OINTMENT  Insurance Company: [employer] - Phone [phone] Fax [phone]  Pharmacy Filling the Rx: [id number] - [place], [place] - [address]  Filling Pharmacy Phone: [phone]  Filling Pharmacy Fax:    Start Date: 1/22/2021

## 2021-01-22 NOTE — TELEPHONE ENCOUNTER
Prior Authorization Retail Medication Request  [facility] KEY# [id number]    Medication/Dose: TACROLIMUS 0.1% OINTMENT  ICD code (if different than what is on RX):    Previously Tried and Failed:    Rationale:      Insurance Name:    Insurance ID:       Pharmacy Information (if different than what is on RX)  Name:    Phone:

## 2021-01-25 NOTE — TELEPHONE ENCOUNTER
PRIOR AUTHORIZATION DENIED    Medication: TACROLIMUS 0.1% OINTMENT - DENIED     Denial Date: 1/22/2021    Denial Rational: You must first try and fail the formulary alternatives:       ELIDEL (BRAND)    PROTOPIC (BRAND)        Appeal Information: Please write a letter of medical necessity

## 2021-02-01 RX ORDER — TACROLIMUS 0.1 %
OINTMENT (GRAM) TOPICAL 2 TIMES DAILY
Qty: 60 G | Refills: 0 | Status: SHIPPED | OUTPATIENT
Start: 2021-02-01

## 2021-02-01 NOTE — TELEPHONE ENCOUNTER
Some insurances are wanting to cover brand name over generic this year. I've been seeing a lot of this.

## 2021-02-01 NOTE — TELEPHONE ENCOUNTER
So, the generic is not covered but the brand is?  This seems odd for obvious reasons.  Can you clarify please.  Thanks.  Selwyn Lopez MD

## 2021-02-02 ENCOUNTER — TELEPHONE (OUTPATIENT)
Dept: FAMILY MEDICINE | Facility: OTHER | Age: 41
End: 2021-02-02

## 2021-02-02 NOTE — TELEPHONE ENCOUNTER
Called Reji and she states that protopic brand name is covered by insurance and that pharmacy did not submit this.  Called [facility] and asked them to run it through insurance again, it was covered.  Notified pt that protopic brand name is covered by insurance 100 % and they will have it ready for him tomorrow.

## 2021-02-02 NOTE — TELEPHONE ENCOUNTER
9:26 AM    Reason for Call: Eczema treatment     Description:     Call from patient reporting protopic is not covered by insurance, script is over $700 so patient has not picked up the first script from 1/20/21.    Triamcinolone and prednisone worked, unable to use around eyes or groin area.     Pt inquiring if there is another rx that will work for eczema on his face, around eyes and in groin/upper thigh area.     Has tried eucerin, with no relief or healing. State he has not tried vanicream or cetaphil.     Instructions to try vanicream or cetaphil, small size to see if one will work for the patient per Dr. Lopez's note on 1/20/21. Notified patient the PA for protopic was denied, with denial rationale: must try and fail the formulary alternatives.     Please advise if there are any creams/lotions in place of protopic since it is not covered by insurance.     Patient may be reached at [phone]    Was an appointment offered for this call? No  If yes : Appointment type              Date    Preferred method for responding to this message: Telephone Call  What is your phone number-  [phone]    If we cannot reach you directly, may we leave a detailed response at the number you provided? Yes    Can this message wait until your PCP/provider returns, if available today? YES        Adelita Agustin RN

## 2021-05-01 ENCOUNTER — HOSPITAL ENCOUNTER (EMERGENCY)
Facility: HOSPITAL | Age: 41
Discharge: HOME | End: 2021-05-02
Attending: INTERNAL MEDICINE | Admitting: INTERNAL MEDICINE
Payer: COMMERCIAL

## 2021-05-01 DIAGNOSIS — R11.2 NAUSEA AND VOMITING, INTRACTABILITY OF VOMITING NOT SPECIFIED, UNSPECIFIED VOMITING TYPE: ICD-10-CM

## 2021-05-01 DIAGNOSIS — F41.0 PANIC ATTACK: ICD-10-CM

## 2021-05-01 DIAGNOSIS — R10.84 ABDOMINAL PAIN, GENERALIZED: ICD-10-CM

## 2021-05-01 LAB
ALBUMIN SERPL-MCNC: 4.2 G/DL (ref 3.4–5)
ALP SERPL-CCNC: 112 U/L (ref 40–150)
ALT SERPL W P-5'-P-CCNC: 79 U/L (ref 0–70)
ANION GAP SERPL CALCULATED.3IONS-SCNC: 5 MMOL/L (ref 3–14)
AST SERPL W P-5'-P-CCNC: 39 U/L (ref 0–45)
BASOPHILS # BLD AUTO: 0 10E9/L (ref 0–0.2)
BASOPHILS NFR BLD AUTO: 0.3 %
BILIRUB SERPL-MCNC: 0.4 MG/DL (ref 0.2–1.3)
BUN SERPL-MCNC: 12 MG/DL (ref 7–30)
CALCIUM SERPL-MCNC: 9.5 MG/DL (ref 8.5–10.1)
CHLORIDE SERPL-SCNC: 104 MMOL/L (ref 94–109)
CO2 SERPL-SCNC: 29 MMOL/L (ref 20–32)
CREAT SERPL-MCNC: 1.19 MG/DL (ref 0.66–1.25)
DIFFERENTIAL METHOD BLD: ABNORMAL
EOSINOPHIL # BLD AUTO: 0.1 10E9/L (ref 0–0.7)
EOSINOPHIL NFR BLD AUTO: 1.1 %
ERYTHROCYTE [DISTWIDTH] IN BLOOD BY AUTOMATED COUNT: 11.9 % (ref 10–15)
GFR SERPL CREATININE-BSD FRML MDRD: 77 ML/MIN/{1.73_M2}
GLUCOSE SERPL-MCNC: 110 MG/DL (ref 70–99)
HCT VFR BLD AUTO: 44.6 % (ref 40–53)
HGB BLD-MCNC: 15.9 G/DL (ref 13.3–17.7)
IMM GRANULOCYTES # BLD: 0.1 10E9/L (ref 0–0.4)
IMM GRANULOCYTES NFR BLD: 0.8 %
LIPASE SERPL-CCNC: 242 U/L (ref 73–393)
LYMPHOCYTES # BLD AUTO: 3.3 10E9/L (ref 0.8–5.3)
LYMPHOCYTES NFR BLD AUTO: 29.5 %
MCH RBC QN AUTO: 33 PG (ref 26.5–33)
MCHC RBC AUTO-ENTMCNC: 35.7 G/DL (ref 31.5–36.5)
MCV RBC AUTO: 93 FL (ref 78–100)
MONOCYTES # BLD AUTO: 1 10E9/L (ref 0–1.3)
MONOCYTES NFR BLD AUTO: 8.9 %
NEUTROPHILS # BLD AUTO: 6.6 10E9/L (ref 1.6–8.3)
NEUTROPHILS NFR BLD AUTO: 59.4 %
NRBC # BLD AUTO: 0 10*3/UL
NRBC BLD AUTO-RTO: 0 /100
PLATELET # BLD AUTO: 244 10E9/L (ref 150–450)
POTASSIUM SERPL-SCNC: 3.5 MMOL/L (ref 3.4–5.3)
PROT SERPL-MCNC: 7.9 G/DL (ref 6.8–8.8)
RBC # BLD AUTO: 4.82 10E12/L (ref 4.4–5.9)
SODIUM SERPL-SCNC: 138 MMOL/L (ref 133–144)
WBC # BLD AUTO: 11.2 10E9/L (ref 4–11)

## 2021-05-01 PROCEDURE — 96374 THER/PROPH/DIAG INJ IV PUSH: CPT

## 2021-05-01 PROCEDURE — 99284 EMERGENCY DEPT VISIT MOD MDM: CPT | Performed by: INTERNAL MEDICINE

## 2021-05-01 PROCEDURE — 96361 HYDRATE IV INFUSION ADD-ON: CPT

## 2021-05-01 PROCEDURE — 96375 TX/PRO/DX INJ NEW DRUG ADDON: CPT

## 2021-05-01 PROCEDURE — 83690 ASSAY OF LIPASE: CPT | Performed by: INTERNAL MEDICINE

## 2021-05-01 PROCEDURE — 99284 EMERGENCY DEPT VISIT MOD MDM: CPT | Mod: 25

## 2021-05-01 PROCEDURE — 258N000003 HC RX IP 258 OP 636: Performed by: INTERNAL MEDICINE

## 2021-05-01 PROCEDURE — 36415 COLL VENOUS BLD VENIPUNCTURE: CPT

## 2021-05-01 PROCEDURE — 80053 COMPREHEN METABOLIC PANEL: CPT | Performed by: INTERNAL MEDICINE

## 2021-05-01 PROCEDURE — 85025 COMPLETE CBC W/AUTO DIFF WBC: CPT | Performed by: INTERNAL MEDICINE

## 2021-05-01 PROCEDURE — 250N000011 HC RX IP 250 OP 636: Performed by: INTERNAL MEDICINE

## 2021-05-01 RX ORDER — LORAZEPAM 2 MG/ML
2 INJECTION INTRAMUSCULAR ONCE
Status: COMPLETED | OUTPATIENT
Start: 2021-05-01 | End: 2021-05-01

## 2021-05-01 RX ORDER — METOCLOPRAMIDE HYDROCHLORIDE 5 MG/ML
5 INJECTION INTRAMUSCULAR; INTRAVENOUS ONCE
Status: COMPLETED | OUTPATIENT
Start: 2021-05-01 | End: 2021-05-01

## 2021-05-01 RX ADMIN — SODIUM CHLORIDE 1000 ML: 9 INJECTION, SOLUTION INTRAVENOUS at 23:20

## 2021-05-01 RX ADMIN — METOCLOPRAMIDE 5 MG: 5 INJECTION, SOLUTION INTRAMUSCULAR; INTRAVENOUS at 23:21

## 2021-05-01 RX ADMIN — LORAZEPAM 2 MG: 2 INJECTION INTRAMUSCULAR; INTRAVENOUS at 23:20

## 2021-05-01 ASSESSMENT — MIFFLIN-ST. JEOR: SCORE: 2048.9

## 2021-05-02 VITALS
TEMPERATURE: 97 F | SYSTOLIC BLOOD PRESSURE: 116 MMHG | RESPIRATION RATE: 18 BRPM | HEART RATE: 75 BPM | HEIGHT: 73 IN | BODY MASS INDEX: 31.41 KG/M2 | DIASTOLIC BLOOD PRESSURE: 68 MMHG | WEIGHT: 237 LBS | OXYGEN SATURATION: 93 %

## 2021-05-02 ASSESSMENT — ENCOUNTER SYMPTOMS
NUMBNESS: 0
NECK PAIN: 0
LIGHT-HEADEDNESS: 0
VOICE CHANGE: 0
ANAL BLEEDING: 0
PALPITATIONS: 0
FEVER: 0
NERVOUS/ANXIOUS: 1
WEAKNESS: 0
CONFUSION: 0
BACK PAIN: 0
DIZZINESS: 0
SHORTNESS OF BREATH: 0
VOMITING: 1
NAUSEA: 1
FLANK PAIN: 0
WHEEZING: 0
MYALGIAS: 0
COUGH: 0
FREQUENCY: 0
SLEEP DISTURBANCE: 0
DIAPHORESIS: 0
COLOR CHANGE: 0
CHILLS: 0
DYSURIA: 0
CHEST TIGHTNESS: 0
HEADACHES: 0
BLOOD IN STOOL: 0
ABDOMINAL DISTENTION: 0
ABDOMINAL PAIN: 1

## 2021-05-02 NOTE — ED PROVIDER NOTES
History     Chief Complaint   Patient presents with     Nausea & Vomiting     x 30 minutes     Abdominal Pain     x 15 minutes     The history is provided by the patient.   Vomiting  Severity:  Moderate  Timing:  Constant  Quality:  Stomach contents  Progression:  Unchanged  Chronicity:  New  Recent urination:  Normal  Associated symptoms: abdominal pain    Associated symptoms: no chills, no cough, no fever, no headaches and no myalgias        Allergies:  Allergies   Allergen Reactions     Abilify [Aripiprazole]      Side effects     Bee Venom      Latex Hives     Percocet [Oxycodone-Acetaminophen]      Short of breath vomiting     Toradol      seizure     Tramadol      Short of breath and vomiting     Tegretol [Carbamazepine] Rash       Problem List:    Patient Active Problem List    Diagnosis Date Noted     Anxiety 05/28/2019     Priority: Medium     Patient is followed by  for ongoing prescription of benzodiazepines.  All refills should be approved by this provider, or covering partner.    Medication(s): Klonopin 0.5 and 1 mg.   Maximum quantity per month: 30  Clinic visit frequency required: Q 3 months     Controlled substance agreement on file: Yes       Date(s): 5.24.19  Benzodiazepine use reviewed by psychiatry:  Yes       Date(s): 5.24.19    Last [place] website verification:  done on 5.24.19  [email or web address]           Decreased sex drive 03/14/2017     Priority: Medium     Gastroesophageal reflux disease without esophagitis 03/14/2017     Priority: Medium     Bipolar disorder with psychotic features (H) 11/25/2016     Priority: Medium     History of mental disorder 10/28/2013     Priority: Medium     Posttraumatic stress disorder 10/28/2013     Priority: Medium     Insomnia 10/25/2013     Priority: Medium     Attention deficit disorder 08/07/2012     Priority: Medium        Past Medical History:    No past medical history on file.    Past Surgical History:    Past Surgical  History:   Procedure Laterality Date     DENTAL SURGERY      pulled 2 teeth month ago     ENT SURGERY      sinus       Family History:    Family History   Problem Relation Age of Onset     Mental Illness Mother      Depression Mother        Social History:  Marital Status:  Single [1]  Social History     Tobacco Use     Smoking status: Former Smoker     Packs/day: 0.75     Years: 24.00     Pack years: 18.00     Types: Cigarettes, Other     Start date: 1995     Quit date: 3/12/2019     Years since quittin.1     Smokeless tobacco: Never Used     Tobacco comment: vaping sometimes- noted 22   Substance Use Topics     Alcohol use: Yes     Comment: rare     Drug use: Yes     Types: Marijuana     Comment: on medical marijuana         Medications:    albuterol (PROAIR HFA/PROVENTIL HFA/VENTOLIN HFA) 108 (90 Base) MCG/ACT inhaler  medical cannabis XX (Patient's own supply)  omeprazole (PRILOSEC) 20 MG DR capsule  PROTOPIC 0.1 % external ointment  QUEtiapine ER (SEROQUEL XR) 400 MG 24 hr tablet  tacrolimus (PROTOPIC) 0.1 % external ointment  triamcinolone (ARISTOCORT HP) 0.5 % external cream          Review of Systems   Constitutional: Negative for chills, diaphoresis and fever.   HENT: Negative for voice change.    Eyes: Negative for visual disturbance.   Respiratory: Negative for cough, chest tightness, shortness of breath and wheezing.    Cardiovascular: Negative for chest pain, palpitations and leg swelling.   Gastrointestinal: Positive for abdominal pain, nausea and vomiting. Negative for abdominal distention, anal bleeding and blood in stool.   Genitourinary: Negative for decreased urine volume, dysuria, flank pain and frequency.   Musculoskeletal: Negative for back pain, gait problem, myalgias and neck pain.   Skin: Negative for color change, pallor and rash.   Neurological: Negative for dizziness, syncope, weakness, light-headedness, numbness and headaches.   Psychiatric/Behavioral: Negative for  "confusion, sleep disturbance and suicidal ideas. The patient is nervous/anxious.        Physical Exam   BP: 137/90  Pulse: 73  Temp: 97  F (36.1  C)  Resp: 18  Height: 185.4 cm (6' 1\")  Weight: 107.5 kg (237 lb)  SpO2: 100 %      Physical Exam  Vitals signs and nursing note reviewed.   Constitutional:       Appearance: He is well-developed.   HENT:      Head: Normocephalic and atraumatic.   Eyes:      Conjunctiva/sclera: Conjunctivae normal.      Pupils: Pupils are equal, round, and reactive to light.   Neck:      Musculoskeletal: Normal range of motion and neck supple.      Thyroid: No thyromegaly.      Vascular: No JVD.      Trachea: No tracheal deviation.   Cardiovascular:      Rate and Rhythm: Normal rate and regular rhythm.      Heart sounds: Normal heart sounds. No murmur. No gallop.    Pulmonary:      Effort: Pulmonary effort is normal. No respiratory distress.      Breath sounds: Normal breath sounds. No stridor. No wheezing or rales.   Chest:      Chest wall: No tenderness.   Abdominal:      General: Bowel sounds are normal. There is no distension.      Palpations: Abdomen is soft. There is no mass.      Tenderness: There is no abdominal tenderness. There is no guarding or rebound.   Musculoskeletal: Normal range of motion.         General: No tenderness.   Lymphadenopathy:      Cervical: No cervical adenopathy.   Skin:     General: Skin is warm.      Coloration: Skin is not pale.      Findings: No erythema or rash.   Neurological:      Mental Status: He is alert and oriented to person, place, and time.   Psychiatric:         Mood and Affect: Mood is anxious. Affect is labile.         Behavior: Behavior is agitated.         ED Course        Procedures                 No results found for this or any previous visit (from the past 24 hour(s)).    Medications   0.9% sodium chloride BOLUS (0 mLs Intravenous Stopped 5/2/21 0028)   LORazepam (ATIVAN) injection 2 mg (2 mg Intravenous Given 5/1/21 2320) "   metoclopramide (REGLAN) injection 5 mg (5 mg Intravenous Given 5/1/21 0266)       Assessments & Plan (with Medical Decision Making)   Nausea vomiting, abd pain  Panic attack  Labs reivewed  Symptoms resolved after receiving reglan + IVF+ ativan  Pt slept and woke , reported feeling much better, denied abdominal pain or any other discomfort.  I advised oral hydration at home, return to ER if symptoms reurred  He voiced understanding and agreed .   I have reviewed the nursing notes.    I have reviewed the findings, diagnosis, plan and need for follow up with the patient.      Discharge Medication List as of 5/2/2021 12:43 AM          Final diagnoses:   Panic attack   Nausea and vomiting, intractability of vomiting not specified, unspecified vomiting type   Abdominal pain, generalized       5/1/2021   [facility]     Freddie Mcclain MD  05/02/21 0616       Freddie Mcclain MD  05/04/21 2017

## 2021-07-22 ENCOUNTER — TELEPHONE (OUTPATIENT)
Dept: FAMILY MEDICINE | Facility: OTHER | Age: 41
End: 2021-07-22

## 2021-07-22 DIAGNOSIS — Z91.030 ALLERGY TO BEE STING: Primary | ICD-10-CM

## 2021-07-22 RX ORDER — EPINEPHRINE 0.3 MG/.3ML
0.3 INJECTION SUBCUTANEOUS ONCE
Qty: 0.3 ML | Refills: 1 | Status: SHIPPED | OUTPATIENT
Start: 2021-07-22 | End: 2021-07-22

## 2021-07-22 NOTE — TELEPHONE ENCOUNTER
Patient called requesting an epi-pen due to go going on a trip to [place]. Patient states he is allergic to bees and would like to bring an epi-pen with him. No epi-pen is listed on patients current of past medication list. Bees are listed on patient's allergy list. Covering provider to advise. Pharmacy pended and contact information verified.

## 2021-08-04 DIAGNOSIS — F31.9 BIPOLAR I DISORDER (H): ICD-10-CM

## 2021-08-04 RX ORDER — QUETIAPINE 400 MG/1
400 TABLET, FILM COATED, EXTENDED RELEASE ORAL AT BEDTIME
Qty: 30 TABLET | Refills: 1 | Status: SHIPPED | OUTPATIENT
Start: 2021-08-04 | End: 2021-10-05

## 2021-08-04 NOTE — TELEPHONE ENCOUNTER
Suzanna Reddy out all week.   Last visit: 7.7.20  Last refill: 12.1.20    Future appointments:

## 2021-10-04 DIAGNOSIS — F31.9 BIPOLAR I DISORDER (H): ICD-10-CM

## 2021-10-05 RX ORDER — QUETIAPINE 400 MG/1
400 TABLET, FILM COATED, EXTENDED RELEASE ORAL AT BEDTIME
Qty: 30 TABLET | Refills: 0 | Status: SHIPPED | OUTPATIENT
Start: 2021-10-05 | End: 2021-11-05

## 2021-10-05 NOTE — TELEPHONE ENCOUNTER
seroquel      Last Written Prescription Date: 8/4/21   Last Fill Quantity: 30,   # refills: 1  Last Office Visit: 12/21/20  Future Office visit:

## 2021-11-08 ENCOUNTER — OFFICE VISIT (OUTPATIENT)
Dept: FAMILY MEDICINE | Facility: OTHER | Age: 41
End: 2021-11-08
Attending: FAMILY MEDICINE
Payer: COMMERCIAL

## 2021-11-08 VITALS
DIASTOLIC BLOOD PRESSURE: 98 MMHG | RESPIRATION RATE: 18 BRPM | HEART RATE: 111 BPM | BODY MASS INDEX: 30.48 KG/M2 | TEMPERATURE: 99.5 F | OXYGEN SATURATION: 96 % | WEIGHT: 231 LBS | SYSTOLIC BLOOD PRESSURE: 130 MMHG

## 2021-11-08 DIAGNOSIS — K21.9 GASTROESOPHAGEAL REFLUX DISEASE WITHOUT ESOPHAGITIS: ICD-10-CM

## 2021-11-08 DIAGNOSIS — F43.10 POSTTRAUMATIC STRESS DISORDER: Primary | ICD-10-CM

## 2021-11-08 DIAGNOSIS — F41.9 ANXIETY: ICD-10-CM

## 2021-11-08 DIAGNOSIS — J20.8 ACUTE BRONCHITIS DUE TO OTHER SPECIFIED ORGANISMS: ICD-10-CM

## 2021-11-08 DIAGNOSIS — F31.9 BIPOLAR I DISORDER (H): ICD-10-CM

## 2021-11-08 PROCEDURE — 99214 OFFICE O/P EST MOD 30 MIN: CPT | Performed by: FAMILY MEDICINE

## 2021-11-08 PROCEDURE — G0463 HOSPITAL OUTPT CLINIC VISIT: HCPCS

## 2021-11-08 RX ORDER — EPINEPHRINE 0.3 MG/.3ML
SOLUTION INTRAMUSCULAR; SUBCUTANEOUS
COMMUNITY
Start: 2021-07-27 | End: 2021-11-29

## 2021-11-08 RX ORDER — ALBUTEROL SULFATE 90 UG/1
2 AEROSOL, METERED RESPIRATORY (INHALATION) EVERY 6 HOURS
Qty: 18 G | Refills: 3 | Status: SHIPPED | OUTPATIENT
Start: 2021-11-08 | End: 2022-02-23

## 2021-11-08 RX ORDER — QUETIAPINE 400 MG/1
400 TABLET, FILM COATED, EXTENDED RELEASE ORAL AT BEDTIME
Qty: 90 TABLET | Refills: 3 | Status: SHIPPED | OUTPATIENT
Start: 2021-11-08 | End: 2021-11-22

## 2021-11-08 RX ORDER — EPINEPHRINE 0.3 MG/.3ML
INJECTION SUBCUTANEOUS
COMMUNITY
Start: 2021-07-22 | End: 2024-07-18

## 2021-11-08 ASSESSMENT — ANXIETY QUESTIONNAIRES
6. BECOMING EASILY ANNOYED OR IRRITABLE: MORE THAN HALF THE DAYS
IF YOU CHECKED OFF ANY PROBLEMS ON THIS QUESTIONNAIRE, HOW DIFFICULT HAVE THESE PROBLEMS MADE IT FOR YOU TO DO YOUR WORK, TAKE CARE OF THINGS AT HOME, OR GET ALONG WITH OTHER PEOPLE: NOT DIFFICULT AT ALL
3. WORRYING TOO MUCH ABOUT DIFFERENT THINGS: SEVERAL DAYS
4. TROUBLE RELAXING: SEVERAL DAYS
1. FEELING NERVOUS, ANXIOUS, OR ON EDGE: SEVERAL DAYS
7. FEELING AFRAID AS IF SOMETHING AWFUL MIGHT HAPPEN: NOT AT ALL
5. BEING SO RESTLESS THAT IT IS HARD TO SIT STILL: NOT AT ALL
GAD7 TOTAL SCORE: 5
2. NOT BEING ABLE TO STOP OR CONTROL WORRYING: NOT AT ALL

## 2021-11-08 ASSESSMENT — PAIN SCALES - GENERAL: PAINLEVEL: MILD PAIN (2)

## 2021-11-08 NOTE — PROGRESS NOTES
"  Assessment & Plan     Posttraumatic stress disorder  Stable.  Following with Dr. Vuong.  I sent the seroquel for the year, just so it is done.  He has the appt with her later this month and he told me he will keep it.      Anxiety  Stable.  As above.  He did lose his dog.      Bipolar I disorder (H)  Stable as above.  I added lipids and cmp for monitoring purposes.    - QUEtiapine ER (SEROQUEL XR) 400 MG 24 hr tablet; Take 1 tablet (400 mg) by mouth At Bedtime  - Lipid Profile (Chol, Trig, HDL, LDL calc); Future  - Comprehensive metabolic panel (BMP + Alb, Alk Phos, ALT, AST, Total. Bili, TP); Future    Gastroesophageal reflux disease without esophagitis  Stable.   - omeprazole (PRILOSEC) 20 MG DR capsule; Take 1 capsule (20 mg) by mouth daily    Acute bronchitis due to other specified organisms  Stable.  Occasional breathing tightness.  Set up for the inhaler as he needs it.    - albuterol (PROAIR HFA/PROVENTIL HFA/VENTOLIN HFA) 108 (90 Base) MCG/ACT inhaler; Inhale 2 puffs into the lungs every 6 hours             BMI:   Estimated body mass index is 30.48 kg/m  as calculated from the following:    Height as of 5/1/21: 1.854 m (6' 1\").    Weight as of this encounter: 104.8 kg (231 lb).           No follow-ups on file.    Selwyn Lopez MD  [facility]    Dedra Gunter is a 41 year old who presents for the following health issues     HPI     Anxiety Follow-Up     How are you doing with your anxiety since your last visit? Worsened, had to put his dog    Are you having other symptoms that might be associated with anxiety? Yes:  more anxiety due to some things going at home    Have you had a significant life event? OTHER: troubles with a child and he had to put his dog down.      Are you feeling depressed? Yes:  a little    Do you have any concerns with your use of alcohol or other drugs? No    Social History     Tobacco Use     Smoking status: Former Smoker     Packs/day: 0.75     " Years: 24.00     Pack years: 18.00     Types: Cigarettes, Other     Start date: 1995     Quit date: 3/12/2019     Years since quittin.6     Smokeless tobacco: Never Used     Tobacco comment: vaping sometimes- noted 22   Substance Use Topics     Alcohol use: Yes     Comment: rare     Drug use: Yes     Types: Marijuana     Comment: on medical marijuana      TAMRA-7 SCORE 2020 3/2/2020 2020   Total Score 16 0 8     PHQ 2020 3/2/2020 2020   PHQ-9 Total Score 12 6 8   Q9: Thoughts of better off dead/self-harm past 2 weeks Not at all Not at all Not at all   F/U: Thoughts of suicide or self-harm - - -   F/U: Safety concerns - - -           Concern - chest pain  Onset: 1 month  Description: noticed when he is going to sleep  Intensity: mild, 112/10  Progression of Symptoms:  same and intermittent  Accompanying Signs & Symptoms: none, maybe sob?   Previous history of similar problem: no  Precipitating factors:        Worsened by: activity  Alleviating factors:        Improved by: nothing, it just goes away on its own.   Therapies tried and outcome: None    Med refills on Albuterol inhaler, and prilosec    Review of Systems   Constitutional, HEENT, cardiovascular, pulmonary, gi and gu systems are negative, except as otherwise noted.      Objective    There were no vitals taken for this visit.  There is no height or weight on file to calculate BMI.  Physical Exam   GENERAL: healthy, alert and no distress  NECK: no adenopathy, no asymmetry, masses, or scars and thyroid normal to palpation  RESP: lungs clear to auscultation - no rales, rhonchi or wheezes  CV: regular rate and rhythm, normal S1 S2, no S3 or S4, no murmur, click or rub, no peripheral edema and peripheral pulses strong  ABDOMEN: soft, nontender, no hepatosplenomegaly, no masses and bowel sounds normal  MS: no gross musculoskeletal defects noted, no edema    Labs pending.

## 2021-11-09 ASSESSMENT — PATIENT HEALTH QUESTIONNAIRE - PHQ9: SUM OF ALL RESPONSES TO PHQ QUESTIONS 1-9: 13

## 2021-11-09 ASSESSMENT — ANXIETY QUESTIONNAIRES: GAD7 TOTAL SCORE: 5

## 2021-11-22 ENCOUNTER — VIRTUAL VISIT (OUTPATIENT)
Dept: PSYCHIATRY | Facility: OTHER | Age: 41
End: 2021-11-22
Attending: PSYCHIATRY & NEUROLOGY
Payer: COMMERCIAL

## 2021-11-22 ENCOUNTER — LAB (OUTPATIENT)
Dept: LAB | Facility: OTHER | Age: 41
End: 2021-11-22
Payer: COMMERCIAL

## 2021-11-22 DIAGNOSIS — F31.9 BIPOLAR I DISORDER (H): ICD-10-CM

## 2021-11-22 DIAGNOSIS — G47.33 OSA (OBSTRUCTIVE SLEEP APNEA): Primary | ICD-10-CM

## 2021-11-22 LAB
ALBUMIN SERPL-MCNC: 3.8 G/DL (ref 3.4–5)
ALP SERPL-CCNC: 107 U/L (ref 40–150)
ALT SERPL W P-5'-P-CCNC: 103 U/L (ref 0–70)
ANION GAP SERPL CALCULATED.3IONS-SCNC: 5 MMOL/L (ref 3–14)
AST SERPL W P-5'-P-CCNC: 54 U/L (ref 0–45)
BILIRUB SERPL-MCNC: 0.4 MG/DL (ref 0.2–1.3)
BUN SERPL-MCNC: 6 MG/DL (ref 7–30)
CALCIUM SERPL-MCNC: 8.8 MG/DL (ref 8.5–10.1)
CHLORIDE BLD-SCNC: 107 MMOL/L (ref 94–109)
CHOLEST SERPL-MCNC: 221 MG/DL
CO2 SERPL-SCNC: 26 MMOL/L (ref 20–32)
CREAT SERPL-MCNC: 0.92 MG/DL (ref 0.66–1.25)
FASTING STATUS PATIENT QL REPORTED: YES
GFR SERPL CREATININE-BSD FRML MDRD: >90 ML/MIN/1.73M2
GLUCOSE BLD-MCNC: 101 MG/DL (ref 70–99)
HDLC SERPL-MCNC: 27 MG/DL
LDLC SERPL CALC-MCNC: ABNORMAL MG/DL
NONHDLC SERPL-MCNC: 194 MG/DL
POTASSIUM BLD-SCNC: 3.7 MMOL/L (ref 3.4–5.3)
PROT SERPL-MCNC: 7.8 G/DL (ref 6.8–8.8)
SODIUM SERPL-SCNC: 138 MMOL/L (ref 133–144)
TRIGL SERPL-MCNC: 579 MG/DL

## 2021-11-22 PROCEDURE — 36415 COLL VENOUS BLD VENIPUNCTURE: CPT | Mod: ZL

## 2021-11-22 PROCEDURE — G0463 HOSPITAL OUTPT CLINIC VISIT: HCPCS | Mod: TEL,95

## 2021-11-22 PROCEDURE — 80053 COMPREHEN METABOLIC PANEL: CPT | Mod: ZL

## 2021-11-22 PROCEDURE — 80061 LIPID PANEL: CPT | Mod: ZL

## 2021-11-22 PROCEDURE — 99214 OFFICE O/P EST MOD 30 MIN: CPT | Mod: 95 | Performed by: PSYCHIATRY & NEUROLOGY

## 2021-11-22 RX ORDER — QUETIAPINE 400 MG/1
400 TABLET, FILM COATED, EXTENDED RELEASE ORAL AT BEDTIME
Qty: 90 TABLET | Refills: 3 | Status: SHIPPED | OUTPATIENT
Start: 2021-12-08 | End: 2022-02-23

## 2021-11-22 ASSESSMENT — PAIN SCALES - GENERAL: PAINLEVEL: NO PAIN (0)

## 2021-11-22 ASSESSMENT — ANXIETY QUESTIONNAIRES
6. BECOMING EASILY ANNOYED OR IRRITABLE: SEVERAL DAYS
7. FEELING AFRAID AS IF SOMETHING AWFUL MIGHT HAPPEN: NOT AT ALL
GAD7 TOTAL SCORE: 5
2. NOT BEING ABLE TO STOP OR CONTROL WORRYING: NOT AT ALL
1. FEELING NERVOUS, ANXIOUS, OR ON EDGE: MORE THAN HALF THE DAYS
3. WORRYING TOO MUCH ABOUT DIFFERENT THINGS: SEVERAL DAYS
5. BEING SO RESTLESS THAT IT IS HARD TO SIT STILL: SEVERAL DAYS

## 2021-11-22 ASSESSMENT — PATIENT HEALTH QUESTIONNAIRE - PHQ9: 5. POOR APPETITE OR OVEREATING: NOT AT ALL

## 2021-11-22 NOTE — NURSING NOTE
"Chief Complaint   Patient presents with     RECHECK     Telephone visit.  PTSD, Bipolar disorder, insomnia.       Initial There were no vitals taken for this visit. Estimated body mass index is 30.48 kg/m  as calculated from the following:    Height as of 5/1/21: 1.854 m (6' 1\").    Weight as of 11/8/21: 104.8 kg (231 lb).  Medication Reconciliation: complete  KIERAN GARCIA LPN    "

## 2021-11-22 NOTE — PROGRESS NOTES
"Jani is a 41 year old who is being evaluated via a billable telephone visit.      What phone number would you like to be contacted at? [phone]    Jani Langford is a 39 year old male who is being evaluated via a billable telephone visit.      The patient has been notified of following:     \"This telephone visit will be conducted via a call between you and your physician/provider. We have found that certain health care needs can be provided without the need for a physical exam.  This service lets us provide the care you need with a short phone conversation.  If a prescription is necessary we can send it directly to your pharmacy.  If lab work is needed we can place an order for that and you can then stop by our lab to have the test done at a later time.    Telephone visits are billed at different rates depending on your insurance coverage. During this emergency period, for some insurers they may be billed the same as an in-person visit.  Please reach out to your insurance provider with any questions.    If during the course of the call the physician/provider feels a telephone visit is not appropriate, you will not be charged for this service.\"    Patient has given verbal consent for Telephone visit?  Yes    How would you like to obtain your AVS? Smavahart    Phone call duration: 25 minutes. Total visit time of 25 minutes (documented, ordered meds during our call)        SUBJECTIVE / INTERIM HISTORY                                                                        Social- with ISABEL Sandhu and her kids Children- Phoebe's kids are 5, 7 , and 10 yo.   Last visit May 4 2020: Continue Seroquel 400 mg. He's not taking Klonopin 0.5 mg and Klonopin 1 mg HS but has some left.     - Jani notes been a \"bumpy year\". Couple months ago had to get a different car. The one he went down to [place] to . He invested lot of $ into and still tons of problems with it. Same old with Kirit is now 10 yo. Had major assessment and " "getting to the point recs are if things continue get get placed out of the home. [place], lying, they are starting to get notes from teacher that he is doing his own thing, not paying attention.  - medical card / medical cannabis. Still helps for anxiety  - on their way over to [place] to get a [employer] pup  - girls are doing great albeit Kirit, not  - no SI currently, had some thoughts in prior month however around time having to put his dog down, issues with the car, things with Kirit. \"I'm getting to the point of I just want the pain to stop.\" NOT now but this is how he has felt.   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  - sleep has only gotten worse. Is up every hour. Max of about 4 hours per night.   SUBSTANCE USE- smokes 1/2 PPD since age 17 yo.  past use  Meth: sober. , MJ. [place]: [place] and [employer]. EtOH now rare.    SYMPTOMS-  Mood has been better. No . No manic sx.  nightmares, flashbacks, detached, angry outbursts, self-destructive, startle response, hypervigilance and fear Sleep now: about 2-3 hours then up for awhile, then another hour. His goal would be 6 continuous.  MEDICAL ROS-  appetite increased (in relation to Seroquel)  SOB: usually at night. But sometimes during day as well not even with exertion.  increased appetite / weight  MEDICAL / SURGICAL HISTORY                   Patient Active Problem List   Diagnosis     Bipolar disorder with psychotic features (H)     Insomnia     History of mental disorder     Attention deficit disorder     Posttraumatic stress disorder     Decreased sex drive     Gastroesophageal reflux disease without esophagitis     Anxiety     ALLERGY   Abilify [aripiprazole], Bee venom, Latex, Percocet [oxycodone-acetaminophen], Toradol, Tramadol, and Tegretol [carbamazepine]  MEDICATIONS                                                                                             Current Outpatient Medications   Medication Sig     " "albuterol (PROAIR HFA/PROVENTIL HFA/VENTOLIN HFA) 108 (90 Base) MCG/ACT inhaler Inhale 2 puffs into the lungs every 6 hours     EPINEPHrine (ANY BX GENERIC EQUIV) 0.3 MG/0.3ML injection 2-pack INJECT 0.3 MLS (0.3 MG) INTO THE MUSCLE ONCE FOR 1 DOSE     medical cannabis XX (Patient's own supply) Take by mouth See Admin Instructions From Nigel     omeprazole (PRILOSEC) 20 MG DR capsule Take 1 capsule (20 mg) by mouth daily     PROTOPIC 0.1 % external ointment Apply topically 2 times daily     QUEtiapine ER (SEROQUEL XR) 400 MG 24 hr tablet Take 1 tablet (400 mg) by mouth At Bedtime     SYMJEPI 0.3 MG/0.3ML INJECT 0.3 MLS (0.3 MG) INTO THE MUSCLE ONCE FOR 1 DOSE     tacrolimus (PROTOPIC) 0.1 % external ointment Apply topically 2 times daily     triamcinolone (ARISTOCORT HP) 0.5 % external cream Apply topically 2 times daily     No current facility-administered medications for this visit.         PAST MEDICATION TRIALS    Prozac (fluoxetine), Zoloft (sertraline), Paxil (paroxetine) and Cymbalta (duloxetine). Paxil sexual SEs and constipation. Mirtazapine ineffective for insomnia.   Elavil (amitriptyline).   Lithium Carbonate, Depakote and Depakot ER (valproate) and Neurontin. Topamax didn't help (gabapentin). Lithium \"that was like eating pennies and nickels\" but helped / effective. Didn't like the blood level draws with lithium. Doesn't think has been on tegretol or trileptal.   Seroquel (quetiapine)., Abilify   no older antipsychotics.   Xanax (alprazolam). Doesn't think has been on Buspar   Ambien (zolpidem) and Lunesta (eszopiclone). Lunesta helped initial insomnia, NOT middle. Ambien made him violent and agitated.       VITALS   There were no vitals taken for this visit.     PHQ9                     [unfilled]  LABS                                                                                                                           Recent Labs   Lab Test  05/21/18   0856  11/22/17   0914   CHOL  203*  " 216*   HDL  24*  25*   LDL  142*  132*   TRIG  184*  295*     Lab Results   Component Value Date    TSH 1.27 05/21/2018     Last Basic Metabolic Panel:  Lab Results   Component Value Date     05/21/2018      Lab Results   Component Value Date    POTASSIUM 3.8 05/21/2018     Lab Results   Component Value Date    CHLORIDE 103 05/21/2018     Lab Results   Component Value Date    TOO 9.7 05/21/2018     Lab Results   Component Value Date    CO2 29 05/21/2018     Lab Results   Component Value Date    BUN 8 05/21/2018     Lab Results   Component Value Date    CR 1.09 05/21/2018     Lab Results   Component Value Date    GLC 81 05/21/2018        ms (on Seroquel) March 2017    MENTAL STATUS EXAM                                                                                        Mood anxious. Speech normal volume, rhythm, rate.  Thought process, including associations, was unremarkable and thought content was devoid of suicidal and homicidal ideation and psychotic thought. No hallucinations. Insight was good. Judgment was intact and adequate for safety. Fund of knowledge was intact. Pt demonstrates no obvious problems with attention, concentration, language, recent or remote memory although these were not formally tested.     ASSESSMENT                                                                                                      HISTORICAL:  Initial psych note 9/14/16        NOTES:  Depakote in past didn't like it. Lithium in past metallic past but did work: doesn't want to go on it again. Trazodone: restless leg. Abilify: restless / akathisia. Tegretol rash. Recalls gabapentin years ago ineffective. Rozerem not helping / ineffective. Ambien he had bad / violent reaction  Jani Langford is a 40 yo male with history of bipolar disorder, PTSD and chem dep.  We tried Remeron with no luck. We tried Topamax and no luck. He saw Dr. Garcia  and started on Mirapex. We tried Belsomra and didn't help.    Our  change of Seroquel to Abilify for Jani went well initially. Then he had akathisia and dystonia. Thoughts for future: I was thinking carbamazepine but he had rash in past. Thinking latuda but Jani doesn't  want to take that given risk for akathisia. Saphris    Last visit was ~1-1/2 years ago. Ashley still taking Seroquel. Sleep has only gotten worse and he now wakes up every hour. Max of ~4 hours per night. ER visit spring with panic attack and when given Ativan Jani stopped breathing several times. We are in agreement for a sleep med referral for presumed sleep apnea.        TREATMENT RISK STATEMENT:  The risks, benefits, alternatives and potential adverse effects have been explained and are understood by the pt.  The pt agrees to the treatment plan with the ability to do so.   The pt knows to call the clinic for any problems or access emergency care if needed.        DIAGNOSES                    PTSD   Bipolar I disorder with history of psychotic features      PLAN                                                                                                                    1)  MEDICATIONS:         -- Continue Seroquel 400 mg.   2)  THERAPY:  No Change    3)  LABS:  lipid panel and glucose, CMP 11/2021    4)  PT MONITOR [call for probs]:  Worsening symptoms, SI/HI, SEs from meds    5)  REFERRALS [CD, medical, other]: sleep med referral    6)  RTC: ~3 months

## 2021-11-23 ENCOUNTER — TELEPHONE (OUTPATIENT)
Dept: FAMILY MEDICINE | Facility: OTHER | Age: 41
End: 2021-11-23
Payer: COMMERCIAL

## 2021-11-23 DIAGNOSIS — E78.2 MIXED HYPERLIPIDEMIA: Primary | ICD-10-CM

## 2021-11-23 ASSESSMENT — ANXIETY QUESTIONNAIRES: GAD7 TOTAL SCORE: 5

## 2021-11-23 ASSESSMENT — PATIENT HEALTH QUESTIONNAIRE - PHQ9: SUM OF ALL RESPONSES TO PHQ QUESTIONS 1-9: 15

## 2021-11-24 RX ORDER — ATORVASTATIN CALCIUM 40 MG/1
40 TABLET, FILM COATED ORAL DAILY
Qty: 90 TABLET | Refills: 1 | Status: SHIPPED | OUTPATIENT
Start: 2021-11-24 | End: 2022-05-24

## 2021-11-29 ENCOUNTER — HOSPITAL ENCOUNTER (EMERGENCY)
Facility: HOSPITAL | Age: 41
Discharge: HOME | End: 2021-11-29
Attending: EMERGENCY MEDICINE | Admitting: EMERGENCY MEDICINE
Payer: COMMERCIAL

## 2021-11-29 ENCOUNTER — APPOINTMENT (OUTPATIENT)
Dept: CT IMAGING | Facility: HOSPITAL | Age: 41
End: 2021-11-29
Attending: EMERGENCY MEDICINE
Payer: COMMERCIAL

## 2021-11-29 VITALS
DIASTOLIC BLOOD PRESSURE: 83 MMHG | SYSTOLIC BLOOD PRESSURE: 114 MMHG | RESPIRATION RATE: 15 BRPM | OXYGEN SATURATION: 94 % | TEMPERATURE: 97.9 F | HEART RATE: 75 BPM

## 2021-11-29 DIAGNOSIS — U07.1 INFECTION DUE TO 2019 NOVEL CORONAVIRUS: ICD-10-CM

## 2021-11-29 DIAGNOSIS — J18.9 PNEUMONIA OF RIGHT MIDDLE LOBE DUE TO INFECTIOUS ORGANISM: ICD-10-CM

## 2021-11-29 LAB
ANION GAP SERPL CALCULATED.3IONS-SCNC: 6 MMOL/L (ref 3–14)
BASOPHILS # BLD AUTO: 0 10E3/UL (ref 0–0.2)
BASOPHILS NFR BLD AUTO: 0 %
BUN SERPL-MCNC: 7 MG/DL (ref 7–30)
CALCIUM SERPL-MCNC: 8.7 MG/DL (ref 8.5–10.1)
CHLORIDE BLD-SCNC: 107 MMOL/L (ref 94–109)
CO2 SERPL-SCNC: 25 MMOL/L (ref 20–32)
CREAT SERPL-MCNC: 0.93 MG/DL (ref 0.66–1.25)
D DIMER PPP FEU-MCNC: <0.3 UG/ML FEU (ref 0–0.5)
EOSINOPHIL # BLD AUTO: 0 10E3/UL (ref 0–0.7)
EOSINOPHIL NFR BLD AUTO: 0 %
ERYTHROCYTE [DISTWIDTH] IN BLOOD BY AUTOMATED COUNT: 12 % (ref 10–15)
GFR SERPL CREATININE-BSD FRML MDRD: >90 ML/MIN/1.73M2
GLUCOSE BLD-MCNC: 100 MG/DL (ref 70–99)
HCT VFR BLD AUTO: 43.4 % (ref 40–53)
HGB BLD-MCNC: 15.3 G/DL (ref 13.3–17.7)
HOLD SPECIMEN: NORMAL
HOLD SPECIMEN: NORMAL
IMM GRANULOCYTES # BLD: 0 10E3/UL
IMM GRANULOCYTES NFR BLD: 1 %
INR PPP: 0.99 (ref 0.85–1.15)
LIPASE SERPL-CCNC: 138 U/L (ref 73–393)
LYMPHOCYTES # BLD AUTO: 1.1 10E3/UL (ref 0.8–5.3)
LYMPHOCYTES NFR BLD AUTO: 26 %
MCH RBC QN AUTO: 32.3 PG (ref 26.5–33)
MCHC RBC AUTO-ENTMCNC: 35.3 G/DL (ref 31.5–36.5)
MCV RBC AUTO: 92 FL (ref 78–100)
MONOCYTES # BLD AUTO: 0.5 10E3/UL (ref 0–1.3)
MONOCYTES NFR BLD AUTO: 11 %
NEUTROPHILS # BLD AUTO: 2.8 10E3/UL (ref 1.6–8.3)
NEUTROPHILS NFR BLD AUTO: 62 %
NRBC # BLD AUTO: 0 10E3/UL
NRBC BLD AUTO-RTO: 0 /100
PLATELET # BLD AUTO: 160 10E3/UL (ref 150–450)
POTASSIUM BLD-SCNC: 3.9 MMOL/L (ref 3.4–5.3)
RBC # BLD AUTO: 4.74 10E6/UL (ref 4.4–5.9)
SODIUM SERPL-SCNC: 138 MMOL/L (ref 133–144)
TROPONIN I SERPL HS-MCNC: 4 NG/L
TROPONIN I SERPL-MCNC: <0.015 UG/L (ref 0–0.04)
WBC # BLD AUTO: 4.5 10E3/UL (ref 4–11)

## 2021-11-29 PROCEDURE — 85610 PROTHROMBIN TIME: CPT | Performed by: EMERGENCY MEDICINE

## 2021-11-29 PROCEDURE — 80048 BASIC METABOLIC PNL TOTAL CA: CPT | Performed by: EMERGENCY MEDICINE

## 2021-11-29 PROCEDURE — 93010 ELECTROCARDIOGRAM REPORT: CPT | Performed by: INTERNAL MEDICINE

## 2021-11-29 PROCEDURE — 83690 ASSAY OF LIPASE: CPT | Performed by: EMERGENCY MEDICINE

## 2021-11-29 PROCEDURE — 84484 ASSAY OF TROPONIN QUANT: CPT | Performed by: EMERGENCY MEDICINE

## 2021-11-29 PROCEDURE — 85025 COMPLETE CBC W/AUTO DIFF WBC: CPT | Performed by: EMERGENCY MEDICINE

## 2021-11-29 PROCEDURE — 99284 EMERGENCY DEPT VISIT MOD MDM: CPT | Performed by: EMERGENCY MEDICINE

## 2021-11-29 PROCEDURE — 250N000013 HC RX MED GY IP 250 OP 250 PS 637: Performed by: EMERGENCY MEDICINE

## 2021-11-29 PROCEDURE — 99284 EMERGENCY DEPT VISIT MOD MDM: CPT | Mod: 25

## 2021-11-29 PROCEDURE — 85379 FIBRIN DEGRADATION QUANT: CPT | Performed by: EMERGENCY MEDICINE

## 2021-11-29 PROCEDURE — 36415 COLL VENOUS BLD VENIPUNCTURE: CPT | Performed by: EMERGENCY MEDICINE

## 2021-11-29 PROCEDURE — 71275 CT ANGIOGRAPHY CHEST: CPT

## 2021-11-29 PROCEDURE — 250N000011 HC RX IP 250 OP 636: Performed by: RADIOLOGY

## 2021-11-29 RX ORDER — AZITHROMYCIN 250 MG/1
TABLET, FILM COATED ORAL
Qty: 6 TABLET | Refills: 0 | Status: SHIPPED | OUTPATIENT
Start: 2021-11-29 | End: 2021-12-04

## 2021-11-29 RX ORDER — NITROGLYCERIN 0.4 MG/1
0.4 TABLET SUBLINGUAL EVERY 5 MIN PRN
Status: DISCONTINUED | OUTPATIENT
Start: 2021-11-29 | End: 2021-11-29 | Stop reason: HOSPADM

## 2021-11-29 RX ORDER — ALBUTEROL SULFATE 90 UG/1
2 AEROSOL, METERED RESPIRATORY (INHALATION) EVERY 6 HOURS PRN
Qty: 18 G | Refills: 0 | Status: SHIPPED | OUTPATIENT
Start: 2021-11-29 | End: 2024-03-20

## 2021-11-29 RX ORDER — IOPAMIDOL 755 MG/ML
73 INJECTION, SOLUTION INTRAVASCULAR ONCE
Status: COMPLETED | OUTPATIENT
Start: 2021-11-29 | End: 2021-11-29

## 2021-11-29 RX ORDER — CEFDINIR 300 MG/1
300 CAPSULE ORAL 2 TIMES DAILY
Qty: 20 CAPSULE | Refills: 0 | Status: SHIPPED | OUTPATIENT
Start: 2021-11-29 | End: 2021-12-09

## 2021-11-29 RX ADMIN — IOPAMIDOL 73 ML: 755 INJECTION, SOLUTION INTRAVENOUS at 09:22

## 2021-11-29 RX ADMIN — NITROGLYCERIN 0.4 MG: 0.4 TABLET SUBLINGUAL at 08:46

## 2021-11-29 NOTE — ED NOTES
Discharge instructions reviewed with patient.  Rx has been e-scribed to pharmacy of choice.  Encouraged to return with new or worsening symptoms. Copy of AVS in hand on discharge.

## 2021-11-29 NOTE — ED PROVIDER NOTES
History     Chief Complaint   Patient presents with     Covid Concern     Chest Pain     HPI  Jani Langford is a 41 year old male who cp under left pectoralis muscle, sharp, started several days prior to positive test 11/23.  Hurts with deep breath.  Sob.  No other pain other thanbody aches.  Duration ongoing.  Character persistent.  Healthy selfdescribed.  No other associated symptoms.  No vaccine.  No known prior c19 infection    Allergies:  Allergies   Allergen Reactions     Abilify [Aripiprazole]      Side effects     Bee Venom      Latex Hives     Percocet [Oxycodone-Acetaminophen]      Short of breath vomiting     Toradol      seizure     Tramadol      Short of breath and vomiting     Tegretol [Carbamazepine] Rash       Problem List:    Patient Active Problem List    Diagnosis Date Noted     Anxiety 05/28/2019     Priority: Medium     Patient is followed by  for ongoing prescription of benzodiazepines.  All refills should be approved by this provider, or covering partner.    Medication(s): Klonopin 0.5 and 1 mg.   Maximum quantity per month: 30  Clinic visit frequency required: Q 3 months     Controlled substance agreement on file: Yes       Date(s): 5.24.19  Benzodiazepine use reviewed by psychiatry:  Yes       Date(s): 5.24.19    Last [place] website verification:  done on 5.24.19  [email or web address]           Decreased sex drive 03/14/2017     Priority: Medium     Gastroesophageal reflux disease without esophagitis 03/14/2017     Priority: Medium     Bipolar disorder with psychotic features (H) 11/25/2016     Priority: Medium     History of mental disorder 10/28/2013     Priority: Medium     Posttraumatic stress disorder 10/28/2013     Priority: Medium     Insomnia 10/25/2013     Priority: Medium     Attention deficit disorder 08/07/2012     Priority: Medium        Past Medical History:    History reviewed. No pertinent past medical history.    Past Surgical History:    Past  Surgical History:   Procedure Laterality Date     DENTAL SURGERY      pulled 2 teeth month ago     ENT SURGERY      sinus       Family History:    Family History   Problem Relation Age of Onset     Mental Illness Mother      Depression Mother        Social History:  Marital Status:  Single [1]  Social History     Tobacco Use     Smoking status: Former Smoker     Packs/day: 0.75     Years: 24.00     Pack years: 18.00     Types: Cigarettes, Other     Start date: 1995     Quit date: 3/12/2019     Years since quittin.7     Smokeless tobacco: Never Used     Tobacco comment: vaping sometimes- noted 22   Substance Use Topics     Alcohol use: Not Currently     Comment: rare     Drug use: Yes     Types: Marijuana     Comment: on medical marijuana         Medications:    albuterol (PROAIR HFA/PROVENTIL HFA/VENTOLIN HFA) 108 (90 Base) MCG/ACT inhaler  albuterol (PROAIR HFA/PROVENTIL HFA/VENTOLIN HFA) 108 (90 Base) MCG/ACT inhaler  atorvastatin (LIPITOR) 40 MG tablet  azithromycin (ZITHROMAX Z-STEPHANIE) 250 MG tablet  cefdinir (OMNICEF) 300 MG capsule  EPINEPHrine (ANY BX GENERIC EQUIV) 0.3 MG/0.3ML injection 2-pack  medical cannabis XX (Patient's own supply)  omeprazole (PRILOSEC) 20 MG DR capsule  PROTOPIC 0.1 % external ointment  [START ON 2021] QUEtiapine ER (SEROQUEL XR) 400 MG 24 hr tablet  tacrolimus (PROTOPIC) 0.1 % external ointment  triamcinolone (ARISTOCORT HP) 0.5 % external cream          Review of Systems   resp hpi, cv hpi, remainder of complete 10 pt ros negative    Physical Exam   BP: 117/90  Pulse: 108  Temp: 98.8  F (37.1  C)  Resp: 20  SpO2: 95 %      Physical Exam  HENT:      Head: Atraumatic.   Eyes:      Extraocular Movements: Extraocular movements intact.      Pupils: Pupils are equal, round, and reactive to light.   Cardiovascular:      Rate and Rhythm: Normal rate.   Pulmonary:      Effort: No tachypnea or respiratory distress.      Comments: No focal assymetry  Abdominal:      Palpations:  Abdomen is soft.   Musculoskeletal:         General: Normal range of motion.      Cervical back: Normal range of motion and neck supple.      Right lower leg: No edema.      Left lower leg: No edema.   Skin:     General: Skin is warm and dry.      Capillary Refill: Capillary refill takes less than 2 seconds.   Neurological:      General: No focal deficit present.      Mental Status: He is alert and oriented to person, place, and time.   Psychiatric:         Mood and Affect: Mood normal.       EKG read in real-time by the emergency physician shows normal sinus rhythm.  Ventricular rate 96, CO interval 168, QRS 88,        Results for orders placed or performed during the hospital encounter of 11/29/21 (from the past 24 hour(s))   CBC with platelets differential    Narrative    The following orders were created for panel order CBC with platelets differential.  Procedure                               Abnormality         Status                     ---------                               -----------         ------                     CBC with platelets and d...[[id number]]                      Final result                 Please view results for these tests on the individual orders.   INR   Result Value Ref Range    INR 0.99 0.85 - 1.15   D dimer quantitative   Result Value Ref Range    D-Dimer Quantitative <0.30 0.00 - 0.50 ug/mL FEU    Narrative    This D-dimer assay is intended for use in conjunction with a clinical pretest probability assessment model to exclude pulmonary embolism (PE) and deep venous thrombosis (DVT) in outpatients suspected of PE or DVT. The cut-off value is 0.50 ug/mL FEU.   Basic metabolic panel   Result Value Ref Range    Sodium 138 133 - 144 mmol/L    Potassium 3.9 3.4 - 5.3 mmol/L    Chloride 107 94 - 109 mmol/L    Carbon Dioxide (CO2) 25 20 - 32 mmol/L    Anion Gap 6 3 - 14 mmol/L    Urea Nitrogen 7 7 - 30 mg/dL    Creatinine 0.93 0.66 - 1.25 mg/dL    Calcium 8.7 8.5 - 10.1 mg/dL     Glucose 100 (H) 70 - 99 mg/dL    GFR Estimate >90 >60 mL/min/1.73m2   Lipase   Result Value Ref Range    Lipase 138 73 - 393 U/L   Troponin I   Result Value Ref Range    Troponin I High Sensitivity 4 <79 ng/L   CBC with platelets and differential   Result Value Ref Range    WBC Count 4.5 4.0 - 11.0 10e3/uL    RBC Count 4.74 4.40 - 5.90 10e6/uL    Hemoglobin 15.3 13.3 - 17.7 g/dL    Hematocrit 43.4 40.0 - 53.0 %    MCV 92 78 - 100 fL    MCH 32.3 26.5 - 33.0 pg    MCHC 35.3 31.5 - 36.5 g/dL    RDW 12.0 10.0 - 15.0 %    Platelet Count 160 150 - 450 10e3/uL    % Neutrophils 62 %    % Lymphocytes 26 %    % Monocytes 11 %    % Eosinophils 0 %    % Basophils 0 %    % Immature Granulocytes 1 %    NRBCs per 100 WBC 0 <1 /100    Absolute Neutrophils 2.8 1.6 - 8.3 10e3/uL    Absolute Lymphocytes 1.1 0.8 - 5.3 10e3/uL    Absolute Monocytes 0.5 0.0 - 1.3 10e3/uL    Absolute Eosinophils 0.0 0.0 - 0.7 10e3/uL    Absolute Basophils 0.0 0.0 - 0.2 10e3/uL    Absolute Immature Granulocytes 0.0 <=0.0 10e3/uL    Absolute NRBCs 0.0 10e3/uL   [facility] Draw    Narrative    The following orders were created for panel order [facility] Draw.  Procedure                               Abnormality         Status                     ---------                               -----------         ------                     Extra Red Top Tube[[id number]]                               Final result               Extra Green Top (Lithium...[[id number]]                      Final result                 Please view results for these tests on the individual orders.   Extra Red Top Tube   Result Value Ref Range    Hold Specimen JIC    Extra Green Top (Lithium Heparin) Tube   Result Value Ref Range    Hold Specimen JIC    Troponin I   Result Value Ref Range    Troponin I <0.015 0.000 - 0.045 ug/L   CTA Chest with Contrast    Narrative    PROCEDURE: CTA CHEST WITH CONTRAST 11/29/2021 9:33 AM    HISTORY: PE suspected, high prob short of breath    COMPARISONS:  None.    Meds/Dose Given:  73mL    TECHNIQUE: CT angiogram of the chest was performed with sagittal and  coronal MIPS reconstructions    FINDINGS: There are no pulmonary emboli. The heart is normal in size.  The thoracic aorta appears normal.    There are peripheral areas of increased density in both lungs worse on  the right than the left suspicious for pneumonia. There is some mild  pleural thickening noted bilaterally. There is a mildly enlarged right  hilar lymph node. The mediastinal lymph nodes are normal. Axillary and  supraclavicular lymph nodes appear normal.    The upper portion of the liver spleen and pancreas appear normal. The  adrenal glands are normal. There are no calcified gallstones. The  regional skeleton is intact.         Impression    IMPRESSION: Peripheral areas of groundglass opacity in both lungs  suspicious for pneumonia. The opacities are worse on the right than  the left.    No pulmonary emboli.    SHERRY KIRKPATRICK MD         SYSTEM ID:  [id number]       Medications   iopamidol (ISOVUE-370) solution 73 mL (73 mLs Intravenous Given 11/29/21 0922)   sodium chloride (PF) 0.9% PF flush 100 mL (100 mLs Intravenous Given 11/29/21 0922)       Assessments & Plan (with Medical Decision Making)   41-year-old male presented left chest wall discomfort in the setting of Covid infection, not found to have PE, reassuring labs, does have pneumonia, plan for outpatient treatment for pneumonia with cefdinir and azithromycin.  Patient agrees with plan.  Advised to return if any new or concerning symptoms.    Discharge Medication List as of 11/29/2021 10:37 AM      START taking these medications    Details   !! albuterol (PROAIR HFA/PROVENTIL HFA/VENTOLIN HFA) 108 (90 Base) MCG/ACT inhaler Inhale 2 puffs into the lungs every 6 hours as needed for shortness of breath / dyspnea or wheezing, Disp-18 g, R-0, E-PrescribePharmacy may dispense brand covered by insurance (Proair, or proventil or ventolin or  generic albuterol inhaler)      azithromycin (ZITHROMAX Z-STEPHANIE) 250 MG tablet Two tablets on the first day, then one tablet daily for the next 4 days, Disp-6 tablet, R-0, E-Prescribe      cefdinir (OMNICEF) 300 MG capsule Take 1 capsule (300 mg) by mouth 2 times daily for 10 days, Disp-20 capsule, R-0, E-Prescribe       !! - Potential duplicate medications found. Please discuss with provider.          Final diagnoses:   Pneumonia of right middle lobe due to infectious organism   Infection due to 2019 novel coronavirus       11/29/2021   [facility]     Jozef Levine MD  11/29/21 0831

## 2021-11-29 NOTE — DISCHARGE INSTRUCTIONS

## 2021-11-29 NOTE — ED NOTES
Pt reports the chest pain is under left pec, describes it as sharp but like a dull knife trying to stab him.  Chest pain onset over a week a few days before he tested positive for covid which was on 11/23/21  Pain increases with deep breaths.  Also reports that he was started on lipitor about a week-two weeks ago

## 2021-11-29 NOTE — ED TRIAGE NOTES
Tested positive on Tuesday at the [facility].   Was exposed to his to girlfriend who is also positive.   Reports symptom onset of Tuesday evening. Unvaccinated.

## 2022-01-10 DIAGNOSIS — G47.33 OSA (OBSTRUCTIVE SLEEP APNEA): Primary | ICD-10-CM

## 2022-01-21 DIAGNOSIS — G47.33 OSA (OBSTRUCTIVE SLEEP APNEA): Primary | ICD-10-CM

## 2022-02-23 ENCOUNTER — VIRTUAL VISIT (OUTPATIENT)
Dept: PSYCHIATRY | Facility: OTHER | Age: 42
End: 2022-02-23
Attending: PSYCHIATRY & NEUROLOGY
Payer: COMMERCIAL

## 2022-02-23 DIAGNOSIS — F31.9 BIPOLAR I DISORDER (H): ICD-10-CM

## 2022-02-23 PROCEDURE — 99213 OFFICE O/P EST LOW 20 MIN: CPT | Mod: 95 | Performed by: PSYCHIATRY & NEUROLOGY

## 2022-02-23 RX ORDER — QUETIAPINE 400 MG/1
400 TABLET, FILM COATED, EXTENDED RELEASE ORAL AT BEDTIME
Qty: 90 TABLET | Refills: 3 | Status: SHIPPED | OUTPATIENT
Start: 2022-03-02 | End: 2022-11-21

## 2022-02-23 ASSESSMENT — ANXIETY QUESTIONNAIRES
5. BEING SO RESTLESS THAT IT IS HARD TO SIT STILL: NOT AT ALL
GAD7 TOTAL SCORE: 5
3. WORRYING TOO MUCH ABOUT DIFFERENT THINGS: SEVERAL DAYS
6. BECOMING EASILY ANNOYED OR IRRITABLE: SEVERAL DAYS
1. FEELING NERVOUS, ANXIOUS, OR ON EDGE: SEVERAL DAYS
2. NOT BEING ABLE TO STOP OR CONTROL WORRYING: SEVERAL DAYS
7. FEELING AFRAID AS IF SOMETHING AWFUL MIGHT HAPPEN: NOT AT ALL

## 2022-02-23 ASSESSMENT — PATIENT HEALTH QUESTIONNAIRE - PHQ9
SUM OF ALL RESPONSES TO PHQ QUESTIONS 1-9: 10
5. POOR APPETITE OR OVEREATING: SEVERAL DAYS

## 2022-02-23 ASSESSMENT — PAIN SCALES - GENERAL: PAINLEVEL: NO PAIN (0)

## 2022-02-23 NOTE — NURSING NOTE
"Chief Complaint   Patient presents with     RECHECK     Telephone visit.  Bipolar disorder, PTSD.       Initial There were no vitals taken for this visit. Estimated body mass index is 30.48 kg/m  as calculated from the following:    Height as of 5/1/21: 1.854 m (6' 1\").    Weight as of 11/8/21: 104.8 kg (231 lb).  Medication Reconciliation: complete  KIERAN GARCIA LPN    "

## 2022-02-23 NOTE — PROGRESS NOTES
"  Jani is a 41 year old who is being evaluated via a billable telephone visit.      What phone number would you like to be contacted at? [phone]    The patient has been notified of following:     \"This telephone visit will be conducted via a call between you and your physician/provider. We have found that certain health care needs can be provided without the need for a physical exam.  This service lets us provide the care you need with a short phone conversation.  If a prescription is necessary we can send it directly to your pharmacy.  If lab work is needed we can place an order for that and you can then stop by our lab to have the test done at a later time.    Telephone visits are billed at different rates depending on your insurance coverage. During this emergency period, for some insurers they may be billed the same as an in-person visit.  Please reach out to your insurance provider with any questions.    If during the course of the call the physician/provider feels a telephone visit is not appropriate, you will not be charged for this service.\"    Patient has given verbal consent for Telephone visit?  Yes    How would you like to obtain your AVS? Lela    Phone call duration: 17 minutes. Total visit time of 17 minutes (documented, ordered meds during our call)        SUBJECTIVE / INTERIM HISTORY                                                                        Social- with ISABEL Sandhu and her kids Children- Phoebe's kids are 5, 7 , and 10 yo.   Last visit 11/22/21: Continue Seroquel 400 mg.     - stuff with Kirit got to point got to point was causing major stress with Jani and Phoebe. Jani moved out and is in apartment in [place].   - he feels like this maybe needed to happen.   - he is still on good terms   - [facility] [employer] is ~5 months  - Jani notes been a \"bumpy year\". Couple months ago had to get a different car. The one he went down to [place] to . He invested lot of $ into and " "still tons of problems with it. Same old with Kirit is now 10 yo. Had major assessment and getting to the point recs are if things continue get get placed out of the home. [employer], lying, they are starting to get notes from teacher that he is doing his own thing, not paying attention.  - medical card / medical cannabis. Still helps for anxiety  - no SI currently, had some thoughts in prior month however around time having to put his dog down, issues with the car, things with Kirit. \"I'm getting to the point of I just want the pain to stop.\" NOT now but this is how he has felt.   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  - sleep has only gotten worse. Is up every hour. Max of about 4 hours per night.   SUBSTANCE USE- smokes 1/2 PPD since age 17 yo.  past use  Meth: sober. , MJ. [place]: [place] and [employer]. EtOH now rare.    SYMPTOMS-  Mood has been better. No . No manic sx.  nightmares, flashbacks, detached, angry outbursts, self-destructive, startle response, hypervigilance and fear Sleep now: about 2-3 hours then up for awhile, then another hour. His goal would be 6 continuous.  MEDICAL ROS-  appetite increased (in relation to Seroquel)  SOB: usually at night. But sometimes during day as well not even with exertion.  increased appetite / weight  MEDICAL / SURGICAL HISTORY                   Patient Active Problem List   Diagnosis     Bipolar disorder with psychotic features (H)     Insomnia     History of mental disorder     Attention deficit disorder     Posttraumatic stress disorder     Decreased sex drive     Gastroesophageal reflux disease without esophagitis     Anxiety     ALLERGY   Abilify [aripiprazole], Bee venom, Latex, Percocet [oxycodone-acetaminophen], Toradol, Tramadol, and Tegretol [carbamazepine]  MEDICATIONS                                                                                             Current Outpatient Medications   Medication Sig     albuterol " "(PROAIR HFA/PROVENTIL HFA/VENTOLIN HFA) 108 (90 Base) MCG/ACT inhaler Inhale 2 puffs into the lungs every 6 hours as needed for shortness of breath / dyspnea or wheezing     atorvastatin (LIPITOR) 40 MG tablet Take 1 tablet (40 mg) by mouth daily     EPINEPHrine (ANY BX GENERIC EQUIV) 0.3 MG/0.3ML injection 2-pack INJECT 0.3 MLS (0.3 MG) INTO THE MUSCLE ONCE FOR 1 DOSE     medical cannabis XX (Patient's own supply) Take by mouth See Admin Instructions From [facility]     Multiple Vitamin (ONE-DAILY MULTI-VITAMIN PO)      omeprazole (PRILOSEC) 20 MG DR capsule Take 1 capsule (20 mg) by mouth daily     PROTOPIC 0.1 % external ointment Apply topically 2 times daily     QUEtiapine ER (SEROQUEL XR) 400 MG 24 hr tablet Take 1 tablet (400 mg) by mouth At Bedtime     triamcinolone (ARISTOCORT HP) 0.5 % external cream Apply topically 2 times daily     No current facility-administered medications for this visit.         PAST MEDICATION TRIALS    Prozac (fluoxetine), Zoloft (sertraline), Paxil (paroxetine) and Cymbalta (duloxetine). Paxil sexual SEs and constipation. Mirtazapine ineffective for insomnia.   Elavil (amitriptyline).   Lithium Carbonate, Depakote and Depakot ER (valproate) and Neurontin. Topamax didn't help (gabapentin). Lithium \"that was like eating pennies and nickels\" but helped / effective. Didn't like the blood level draws with lithium. Doesn't think has been on tegretol or trileptal.   Seroquel (quetiapine)., Abilify   no older antipsychotics.   Xanax (alprazolam). Doesn't think has been on Buspar   Ambien (zolpidem) and Lunesta (eszopiclone). Lunesta helped initial insomnia, NOT middle. Ambien made him violent and agitated.       VITALS   There were no vitals taken for this visit.     PHQ9                     [unfilled]  LABS                                                                                                                           Recent Labs   Lab Test  05/21/18   0856  11/22/17   0914 " "  CHOL  203*  216*   HDL  24*  25*   LDL  142*  132*   TRIG  184*  295*     Lab Results   Component Value Date    TSH 1.27 05/21/2018     Last Basic Metabolic Panel:  Lab Results   Component Value Date     05/21/2018      Lab Results   Component Value Date    POTASSIUM 3.8 05/21/2018     Lab Results   Component Value Date    CHLORIDE 103 05/21/2018     Lab Results   Component Value Date    TOO 9.7 05/21/2018     Lab Results   Component Value Date    CO2 29 05/21/2018     Lab Results   Component Value Date    BUN 8 05/21/2018     Lab Results   Component Value Date    CR 1.09 05/21/2018     Lab Results   Component Value Date    GLC 81 05/21/2018        ms (on Seroquel) March 2017    MENTAL STATUS EXAM                                                                                        Mood today noted as \"I'm not as anxious since I moved out\".  Speech normal volume, rhythm, rate.  Thought process, including associations, was unremarkable and thought content was devoid of suicidal and homicidal ideation and psychotic thought. No hallucinations. Insight was good. Judgment was intact and adequate for safety. Fund of knowledge was intact. Pt demonstrates no obvious problems with attention, concentration, language, recent or remote memory although these were not formally tested.     ASSESSMENT                                                                                                      HISTORICAL:  Initial psych note 9/14/16        NOTES:  Depakote in past didn't like it. Lithium in past metallic past but did work: doesn't want to go on it again. Trazodone: restless leg. Abilify: restless / akathisia. Tegretol rash. Recalls gabapentin years ago ineffective. Rozerem not helping / ineffective. Ambien he had bad / violent reaction  Jani Langford is a 40 yo male with history of bipolar disorder, PTSD and chem dep.  We tried Remeron with no luck. We tried Topamax and no luck. He saw Dr. Garcia  and " started on Mirapex. We tried Belsomra and didn't help.    Our change of Seroquel to Abilify for Jani went well initially. Then he had akathisia and dystonia. Thoughts for future: I was thinking carbamazepine but he had rash in past. Thinking latuda but Jani doesn't  want to take that given risk for akathisia. Saphris    Today Jani informed me he moved out. He and Phoebe were together for 6 years and it came down to issues with Phoebe's soon Kirit who has major mental and behavioral issues. Jani was in disagreement as to how Phoebe was handling things with Kirit.. Noted I'm proud of Jani because based on his history things could have went much worse.    TREATMENT RISK STATEMENT:  The risks, benefits, alternatives and potential adverse effects have been explained and are understood by the pt.  The pt agrees to the treatment plan with the ability to do so.   The pt knows to call the clinic for any problems or access emergency care if needed.        DIAGNOSES                    PTSD   Bipolar I disorder with history of psychotic features      PLAN                                                                                                                    1)  MEDICATIONS:         -- Continue Seroquel 400 mg, I set to fill for 3/2/22  2)  THERAPY:  No Change    3)  LABS:  lipid panel and glucose, CMP 11/2021    4)  PT MONITOR [call for probs]:  Worsening symptoms, SI/HI, SEs from meds    5)  REFERRALS [CD, medical, other]: sleep med referral    6)  RTC: ~2 months

## 2022-02-24 ASSESSMENT — ANXIETY QUESTIONNAIRES: GAD7 TOTAL SCORE: 5

## 2022-03-21 NOTE — PATIENT INSTRUCTIONS
Follow up if symptoms persist or worsen.     Oumou is a 58 year old who is being evaluated via a billable video visit.      Patient did E-Check in. Questionnaires blown and patient checked in without being called.    How would you like to obtain your AVS? MyChart  If the video visit is dropped, the invitation should be resent by:   Will anyone else be joining your video visit? No    Video Start Time: 8:31 AM    Assessment & Plan     Advanced directives, counseling/discussion  Mailing form    Moderate recurrent major depression (H)  Uncontrolled, Has counseling in place  Healthy habits.   Benadryl or melatonin for sleep.   Starting Prozac.   Re-check in 3 weeks.     - FLUoxetine (PROZAC) 20 MG capsule; Take 1 capsule (20 mg) by mouth daily    Screening for colon cancer  ordered  - Adult Gastro Ref - Procedure Only; Future             Depression Screening Follow Up    PHQ 3/21/2022   PHQ-9 Total Score 19   Q9: Thoughts of better off dead/self-harm past 2 weeks Not at all     No SI.     Follow Up Actions Taken  Patient counseled, no additional follow up at this time.         Return in about 3 weeks (around 4/12/2022) for re-check office visit.    TORIBIO Steen [facility] DONELL Coello is a 58 year old who presents for the following health issues     History of Present Illness       Mental Health Follow-up:  Patient presents to follow-up on Depression & Anxiety.Patient's depression since last visit has been:  Worse  The patient is having other symptoms associated with depression.  Patient's anxiety since last visit has been:  Worse  The patient is having other symptoms associated with anxiety.  Any significant life events: grief or loss  Patient is feeling anxious or having panic attacks.  Patient has no concerns about alcohol or drug use.         Today's MICHELLE-7 Score: 19    Reason for visit:  Depression / grief / anxiety  Symptom onset:  More than a month  Symptoms include:  Tired difficulty being around people desire  to be along lonely sudden emotional breakdowns or outbursts  Symptom intensity:  Moderate  Symptom progression:  Staying the same  Had these symptoms before:  No    She eats 0-1 servings of fruits and vegetables daily.She consumes 0 sweetened beverage(s) daily.She exercises with enough effort to increase her heart rate 30 to 60 minutes per day.  She exercises with enough effort to increase her heart rate 5 days per week.   She is taking medications regularly.     PHQ 5/4/2020 3/17/2022 3/21/2022   PHQ-9 Total Score 8 22 19   Q9: Thoughts of better off dead/self-harm past 2 weeks Not at all Not at all Not at all      from  in May.    He had mental health issues and changed drastically. Was ill with Hepatitis C/liver cancer, was using drugs as well.   since Sept. 2021.  Azucena though  continued to care take for him with his cancer. He got severely ill in beginning of Dec. And continued to decline, got Covid and   He ultimately passed Angelo Azucena, 2021.    Hard time coping when seeing people she knows .    Seeing a therapist.   Hard time sleeping   Gained 30 LBS, recently lost 20 LBS.      Due for PE.         Review of Systems   Constitutional, HEENT, cardiovascular, pulmonary, gi and gu systems are negative, except as otherwise noted.      Objective           Vitals:  No vitals were obtained today due to virtual visit.    Physical Exam   GENERAL: Healthy, alert and no distress  EYES: Eyes grossly normal to inspection.  No discharge or erythema, or obvious scleral/conjunctival abnormalities.  RESP: No audible wheeze, cough, or visible cyanosis.  No visible retractions or increased work of breathing.    SKIN: Visible skin clear. No significant rash, abnormal pigmentation or lesions.  NEURO: Cranial nerves grossly intact.  Mentation and speech appropriate for age.  PSYCH: mentation appears normal, tearful, judgement and insight intact and appearance well groomed                Video-Visit  Details    Type of service:  Video Visit    Video End Time:9:00 AM    Originating Location (pt. Location): Home    Distant Location (provider location):  [facility]     Platform used for Video Visit: Dynis

## 2022-04-25 ENCOUNTER — TELEPHONE (OUTPATIENT)
Dept: FAMILY MEDICINE | Facility: OTHER | Age: 42
End: 2022-04-25
Payer: COMMERCIAL

## 2022-04-25 NOTE — TELEPHONE ENCOUNTER
Call from patient inquiring on atorvastatin refill questions. Patient is unsure how long he is to be taking atorvastatin as patient will be in need of refill upcoming if he is to continue on the medication.     Per Dr. Lopez's note, lab encounter 11/22/21, patient prescribed atorvastatin- to follow up in 6 months for fasting labs. Order is on file for future lipid profile.     Patient scheduled for lab only- fasting on 4/29/22.

## 2022-04-29 ENCOUNTER — LAB (OUTPATIENT)
Dept: LAB | Facility: OTHER | Age: 42
End: 2022-04-29
Payer: COMMERCIAL

## 2022-04-29 DIAGNOSIS — E78.2 MIXED HYPERLIPIDEMIA: ICD-10-CM

## 2022-04-29 LAB
ALBUMIN SERPL-MCNC: 3.9 G/DL (ref 3.4–5)
ALP SERPL-CCNC: 129 U/L (ref 40–150)
ALT SERPL W P-5'-P-CCNC: 88 U/L (ref 0–70)
ANION GAP SERPL CALCULATED.3IONS-SCNC: 9 MMOL/L (ref 3–14)
AST SERPL W P-5'-P-CCNC: 37 U/L (ref 0–45)
BILIRUB SERPL-MCNC: 0.5 MG/DL (ref 0.2–1.3)
BUN SERPL-MCNC: 8 MG/DL (ref 7–30)
CALCIUM SERPL-MCNC: 9 MG/DL (ref 8.5–10.1)
CHLORIDE BLD-SCNC: 104 MMOL/L (ref 94–109)
CHOLEST SERPL-MCNC: 130 MG/DL
CO2 SERPL-SCNC: 27 MMOL/L (ref 20–32)
CREAT SERPL-MCNC: 0.93 MG/DL (ref 0.66–1.25)
FASTING STATUS PATIENT QL REPORTED: YES
GFR SERPL CREATININE-BSD FRML MDRD: >90 ML/MIN/1.73M2
GLUCOSE BLD-MCNC: 102 MG/DL (ref 70–99)
HDLC SERPL-MCNC: 28 MG/DL
LDLC SERPL CALC-MCNC: 43 MG/DL
NONHDLC SERPL-MCNC: 102 MG/DL
POTASSIUM BLD-SCNC: 3.7 MMOL/L (ref 3.4–5.3)
PROT SERPL-MCNC: 7.7 G/DL (ref 6.8–8.8)
SODIUM SERPL-SCNC: 140 MMOL/L (ref 133–144)
TRIGL SERPL-MCNC: 294 MG/DL

## 2022-04-29 PROCEDURE — 80061 LIPID PANEL: CPT | Mod: ZL

## 2022-04-29 PROCEDURE — 36415 COLL VENOUS BLD VENIPUNCTURE: CPT | Mod: ZL

## 2022-04-29 PROCEDURE — 80053 COMPREHEN METABOLIC PANEL: CPT | Mod: ZL

## 2022-05-01 ENCOUNTER — HOSPITAL ENCOUNTER (EMERGENCY)
Facility: HOSPITAL | Age: 42
Discharge: HOME | End: 2022-05-01
Attending: STUDENT IN AN ORGANIZED HEALTH CARE EDUCATION/TRAINING PROGRAM | Admitting: STUDENT IN AN ORGANIZED HEALTH CARE EDUCATION/TRAINING PROGRAM
Payer: COMMERCIAL

## 2022-05-01 VITALS
TEMPERATURE: 97.4 F | HEART RATE: 71 BPM | RESPIRATION RATE: 18 BRPM | DIASTOLIC BLOOD PRESSURE: 86 MMHG | SYSTOLIC BLOOD PRESSURE: 127 MMHG | OXYGEN SATURATION: 98 %

## 2022-05-01 DIAGNOSIS — F41.0 ANXIETY ATTACK: ICD-10-CM

## 2022-05-01 DIAGNOSIS — R19.7 NAUSEA VOMITING AND DIARRHEA: Primary | ICD-10-CM

## 2022-05-01 DIAGNOSIS — R11.2 NAUSEA VOMITING AND DIARRHEA: Primary | ICD-10-CM

## 2022-05-01 PROCEDURE — 99283 EMERGENCY DEPT VISIT LOW MDM: CPT

## 2022-05-01 PROCEDURE — 250N000013 HC RX MED GY IP 250 OP 250 PS 637: Performed by: NURSE PRACTITIONER

## 2022-05-01 PROCEDURE — 250N000011 HC RX IP 250 OP 636: Performed by: NURSE PRACTITIONER

## 2022-05-01 PROCEDURE — 99283 EMERGENCY DEPT VISIT LOW MDM: CPT | Performed by: NURSE PRACTITIONER

## 2022-05-01 RX ORDER — ONDANSETRON 4 MG/1
4 TABLET, ORALLY DISINTEGRATING ORAL ONCE
Status: COMPLETED | OUTPATIENT
Start: 2022-05-01 | End: 2022-05-01

## 2022-05-01 RX ORDER — ONDANSETRON 4 MG/1
4 TABLET, ORALLY DISINTEGRATING ORAL EVERY 8 HOURS PRN
Qty: 10 TABLET | Refills: 0 | Status: SHIPPED | OUTPATIENT
Start: 2022-05-01 | End: 2022-05-04

## 2022-05-01 RX ORDER — LORAZEPAM 1 MG/1
2 TABLET ORAL ONCE
Status: COMPLETED | OUTPATIENT
Start: 2022-05-01 | End: 2022-05-01

## 2022-05-01 RX ADMIN — ONDANSETRON 4 MG: 4 TABLET, ORALLY DISINTEGRATING ORAL at 11:45

## 2022-05-01 RX ADMIN — LORAZEPAM 2 MG: 1 TABLET ORAL at 12:04

## 2022-05-01 ASSESSMENT — ENCOUNTER SYMPTOMS
DIZZINESS: 0
SHORTNESS OF BREATH: 1
CHILLS: 1
RHINORRHEA: 0
BLOOD IN STOOL: 0
ABDOMINAL PAIN: 1
FEVER: 0
DIARRHEA: 1
HEADACHES: 0
WOUND: 0
LIGHT-HEADEDNESS: 0
FREQUENCY: 0
COUGH: 0
NAUSEA: 1
COLOR CHANGE: 0
HEMATURIA: 0
APPETITE CHANGE: 1
VOMITING: 1
DYSURIA: 0
SORE THROAT: 0
DIFFICULTY URINATING: 0
NERVOUS/ANXIOUS: 1
DYSPHORIC MOOD: 0
PALPITATIONS: 0
CONSTIPATION: 0

## 2022-05-01 NOTE — ED NOTES
Patient states mid abdominal pain was a 7/10 and getting worse. Anxiety high. Swearing. Unhappy about wearing a mask. Wants to speak to the provider about getting some ativan and something for the nausea. Provider notified

## 2022-05-01 NOTE — DISCHARGE INSTRUCTIONS
(R11.2,  R19.7) Nausea vomiting and diarrhea  (primary encounter diagnosis)  Comment: acute, symptomatic  Plan: No concerning HPI or exam findings. Plan for symptomatic treatments  Zofran 4 mg every 6-8 hours for nausea, vomiting.  Staying hydrated is most important- gatorade, powerade.   Light diet- avoid heavy, greasy foods  Heating pad, acetaminophen (Tylenol), ibuprofen (Advil) for fever, chills, body aches, abdominal cramping    (F41.0) Anxiety attack  Comment: improved  Plan: Continue with usual home treatment        RETURN TO THE ED WITH NEW OR WORSENING SYMPTOMS INCLUDING BUT NOT LIMITED TO WORSENING PAIN, DECREASED URINATION (UNABLE TO PEE 1 TIME IN 12-18 HOURS), LIGHTHEADED/DIZZINESS AND UNABLE TO KEEP FLUIDS DOWN.    FOLLOW-UP WITH YOUR PRIMARY CARE PROVIDER IN 5-7 DAYS.      May Mcnamara, CNP

## 2022-05-01 NOTE — ED PROVIDER NOTES
"  History     Chief Complaint   Patient presents with     Nausea, Vomiting, & Diarrhea     HPI     Jani Langford is a 41 year old male who presents ambulatory for evaluation of sharp, stabbing abdominal pain, nausea, vomiting, diarrhea that started around 0900 this morning. Abdominal discomfort improves with bowel movement but then \"flares back up.\" He tried ibuprofen, and used his medical cannabis this morning without relief. He has associated chills, body aches, general malaise. Yesterday he did not eat anything new or different. He has a friend here with him who states she ate the same things as him and is feeling well. No known exposures.     He has a history of anxiety- uses his medical marijuana for this. He tried medical marijuana this morning but it was not helpful. He is having increased anxiety- shaking, hyperventilating. He denies palpitations, chest pain.           Allergies:  Allergies   Allergen Reactions     Abilify [Aripiprazole]      Side effects     Bee Venom      Latex Hives     Percocet [Oxycodone-Acetaminophen]      Short of breath vomiting     Toradol      seizure     Tramadol      Short of breath and vomiting     Tegretol [Carbamazepine] Rash       Problem List:    Patient Active Problem List    Diagnosis Date Noted     Anxiety 05/28/2019     Priority: Medium     Patient is followed by  for ongoing prescription of benzodiazepines.  All refills should be approved by this provider, or covering partner.    Medication(s): Klonopin 0.5 and 1 mg.   Maximum quantity per month: 30  Clinic visit frequency required: Q 3 months     Controlled substance agreement on file: Yes       Date(s): 5.24.19  Benzodiazepine use reviewed by psychiatry:  Yes       Date(s): 5.24.19    Last [place] website verification:  done on 5.24.19  [email or web address]           Decreased sex drive 03/14/2017     Priority: Medium     Gastroesophageal reflux disease without esophagitis 03/14/2017     " Priority: Medium     Bipolar disorder with psychotic features (H) 11/25/2016     Priority: Medium     History of mental disorder 10/28/2013     Priority: Medium     Posttraumatic stress disorder 10/28/2013     Priority: Medium     Insomnia 10/25/2013     Priority: Medium     Attention deficit disorder 08/07/2012     Priority: Medium        Past Medical History:    History reviewed. No pertinent past medical history.    Past Surgical History:    Past Surgical History:   Procedure Laterality Date     DENTAL SURGERY      pulled 2 teeth month ago     ENT SURGERY      sinus       Family History:    Family History   Problem Relation Age of Onset     Mental Illness Mother      Depression Mother        Social History:  Marital Status:  Single [1]  Social History     Tobacco Use     Smoking status: Current Every Day Smoker     Packs/day: 0.75     Years: 24.00     Pack years: 18.00     Types: Vaping Device, Other     Start date: 1/1/1995     Last attempt to quit: 3/12/2019     Years since quitting: 3.1     Smokeless tobacco: Never Used     Tobacco comment: vaping sometimes- noted 2/20/20   Substance Use Topics     Alcohol use: Not Currently     Comment: rare     Drug use: Yes     Types: Marijuana     Comment: on medical marijuana         Medications:    albuterol (PROAIR HFA/PROVENTIL HFA/VENTOLIN HFA) 108 (90 Base) MCG/ACT inhaler  atorvastatin (LIPITOR) 40 MG tablet  medical cannabis XX (Patient's own supply)  Multiple Vitamin (ONE-DAILY MULTI-VITAMIN PO)  omeprazole (PRILOSEC) 20 MG DR capsule  QUEtiapine ER (SEROQUEL XR) 400 MG 24 hr tablet  EPINEPHrine (ANY BX GENERIC EQUIV) 0.3 MG/0.3ML injection 2-pack  PROTOPIC 0.1 % external ointment  triamcinolone (ARISTOCORT HP) 0.5 % external cream          Review of Systems   Constitutional: Positive for appetite change and chills. Negative for fever.   HENT: Negative for congestion, ear pain, rhinorrhea and sore throat.    Respiratory: Positive for shortness of breath (he  relates this is due to his anxiety). Negative for cough.    Cardiovascular: Negative for chest pain and palpitations.   Gastrointestinal: Positive for abdominal pain, diarrhea, nausea and vomiting. Negative for blood in stool and constipation.   Genitourinary: Negative for difficulty urinating, dysuria, frequency and hematuria.   Musculoskeletal:        + generalized body aches   Skin: Negative for color change, rash and wound.   Neurological: Negative for dizziness, light-headedness and headaches.   Psychiatric/Behavioral: Negative for dysphoric mood. The patient is nervous/anxious.        Physical Exam   BP: 127/86  Pulse: 71  Temp: 97.4  F (36.3  C)  Resp: 18  SpO2: 98 %      Physical Exam  Constitutional:       Appearance: He is not toxic-appearing.      Comments: Anxious- hyperventilating, shaking   HENT:      Head: Normocephalic.      Mouth/Throat:      Lips: Pink.      Mouth: Mucous membranes are moist.   Eyes:      General: Lids are normal.      Conjunctiva/sclera: Conjunctivae normal.   Cardiovascular:      Rate and Rhythm: Normal rate and regular rhythm.      Heart sounds: S1 normal and S2 normal. No murmur heard.    No friction rub. No gallop.   Pulmonary:      Effort: Pulmonary effort is normal.      Breath sounds: Normal breath sounds. No wheezing, rhonchi or rales.   Abdominal:      General: Bowel sounds are normal. There is no distension.      Palpations: Abdomen is soft.      Tenderness: There is generalized abdominal tenderness. There is no right CVA tenderness, left CVA tenderness, guarding or rebound.   Musculoskeletal:      Cervical back: Neck supple.      Right lower leg: No edema.      Left lower leg: No edema.      Comments: FROM of upper and lower extremities   Skin:     General: Skin is warm and moist.      Capillary Refill: Capillary refill takes less than 2 seconds.      Coloration: Skin is not pale.   Neurological:      Mental Status: He is alert and oriented to person, place, and time.       GCS: GCS eye subscore is 4. GCS verbal subscore is 5. GCS motor subscore is 6.      Gait: Gait is intact.   Psychiatric:         Mood and Affect: Mood is anxious.         Speech: Speech normal.         Behavior: Behavior is cooperative.         ED Course              ED Course as of 05/06/22 1418   Sun May 01, 2022   1150 Patient evaluated. Very anxious. Plan for Zofran ODT and oral ativan.   No concerns for surgical abdomen on exam. Labs and imaging not warranted at this time. Plan for symptomatic treatment. Most likely etiology is viral gastroenteritis.  He is in agreement with this plan.  May Mcnamara CNP   1225 Re-evaluated. Nausea and anxiety improved. Plan for zofran to [facility] and discharge home with symptomatic treatments. Strict return to ED precautions.   May Mcnamara CNP on 5/1/2022 at 12:27 PM       Procedures      No results found for any visits on 05/01/22.      No results found for this or any previous visit (from the past 24 hour(s)).    Medications   ondansetron (ZOFRAN-ODT) ODT tab 4 mg (4 mg Oral Given 5/1/22 1145)   LORazepam (ATIVAN) tablet 2 mg (2 mg Oral Given 5/1/22 1204)       Assessments & Plan (with Medical Decision Making)     I have reviewed the nursing notes.    I have reviewed the findings, diagnosis, plan and need for follow up with the patient.  (R11.2,  R19.7) Nausea vomiting and diarrhea  (primary encounter diagnosis)  Comment: acute, symptomatic  Plan: No concerning HPI or exam findings. Plan for symptomatic treatments  Zofran 4 mg every 6-8 hours for nausea, vomiting.  Staying hydrated is most important- gatorade, powerade.   Light diet- avoid heavy, greasy foods  Heating pad, acetaminophen (Tylenol), ibuprofen (Advil) for fever, chills, body aches, abdominal cramping    (F41.0) Anxiety attack  Comment: improved  Plan: Continue with usual home treatment        RETURN TO THE ED WITH NEW OR WORSENING SYMPTOMS INCLUDING BUT NOT LIMITED TO WORSENING PAIN,  DECREASED URINATION (UNABLE TO PEE 1 TIME IN 12-18 HOURS), LIGHTHEADED/DIZZINESS AND UNABLE TO KEEP FLUIDS DOWN.    FOLLOW-UP WITH YOUR PRIMARY CARE PROVIDER IN 5-7 DAYS.      May Mcnamara CNP    Discharge Medication List as of 5/1/2022 12:32 PM      START taking these medications    Details   ondansetron (ZOFRAN ODT) 4 MG ODT tab Take 1 tablet (4 mg) by mouth every 8 hours as needed, Disp-10 tablet, R-0, E-Prescribe             Final diagnoses:   Nausea vomiting and diarrhea   Anxiety attack       5/1/2022   [facility]     May Mcnamara CNP  05/06/22 3299

## 2022-06-07 ENCOUNTER — TELEPHONE (OUTPATIENT)
Dept: FAMILY MEDICINE | Facility: OTHER | Age: 42
End: 2022-06-07
Payer: COMMERCIAL

## 2022-06-07 ENCOUNTER — OFFICE VISIT (OUTPATIENT)
Dept: FAMILY MEDICINE | Facility: OTHER | Age: 42
End: 2022-06-07
Attending: FAMILY MEDICINE
Payer: COMMERCIAL

## 2022-06-07 VITALS
HEART RATE: 84 BPM | WEIGHT: 216 LBS | SYSTOLIC BLOOD PRESSURE: 118 MMHG | DIASTOLIC BLOOD PRESSURE: 68 MMHG | TEMPERATURE: 97.8 F | BODY MASS INDEX: 28.5 KG/M2 | OXYGEN SATURATION: 97 %

## 2022-06-07 DIAGNOSIS — J32.0 MAXILLARY SINUSITIS, UNSPECIFIED CHRONICITY: Primary | ICD-10-CM

## 2022-06-07 PROCEDURE — G0463 HOSPITAL OUTPT CLINIC VISIT: HCPCS

## 2022-06-07 PROCEDURE — 99213 OFFICE O/P EST LOW 20 MIN: CPT | Performed by: FAMILY MEDICINE

## 2022-06-07 ASSESSMENT — PAIN SCALES - GENERAL: PAINLEVEL: MILD PAIN (2)

## 2022-06-07 NOTE — PROGRESS NOTES
Assessment & Plan     Maxillary sinusitis, unspecified chronicity  3 weeks of sx, started with the pollen, now with worsening sx over last few days.  Presumably sinus infection given hx.  augmentin coverage.  F/u with any ongoing concerns.    - amoxicillin-clavulanate (AUGMENTIN) 875-125 MG tablet; Take 1 tablet by mouth 2 times daily for 10 days             Tobacco Cessation:   reports that he has been smoking vaping device and other. He started smoking about 27 years ago. He has a 18.00 pack-year smoking history. He has never used smokeless tobacco.          No follow-ups on file.    Selwyn Lopez MD  [facility]    Dedra Gunter is a 42 year old who presents for the following health issues     HPI     RESPIRATORY SYMPTOMS      Duration: 3 days    Description  nasal congestion, rhinorrhea, sore throat, cough and myalgias    Severity: moderate    Accompanying signs and symptoms: None    History (predisposing factors):  Worse when pollen started     Precipitating or alleviating factors: None    Therapies tried and outcome:  none           Review of Systems   Constitutional, HEENT, cardiovascular, pulmonary, gi and gu systems are negative, except as otherwise noted.      Objective    /68   Pulse 84   Temp 97.8  F (36.6  C)   Wt 98 kg (216 lb)   SpO2 97%   BMI 28.50 kg/m    Body mass index is 28.5 kg/m .  Physical Exam   GENERAL: healthy, alert and no distress  EYES: Eyes grossly normal to inspection, PERRL and conjunctivae and sclerae normal  HENT: ear canals and TM's normal, nose and mouth without ulcers or lesions  NECK: no adenopathy, no asymmetry, masses, or scars and thyroid normal to palpation  RESP: lungs clear to auscultation - no rales, rhonchi or wheezes  CV: regular rate and rhythm, normal S1 S2, no S3 or S4, no murmur, click or rub, no peripheral edema and peripheral pulses strong  ABDOMEN: soft, nontender, no hepatosplenomegaly, no masses and bowel sounds  normal  MS: no gross musculoskeletal defects noted, no edema

## 2022-06-07 NOTE — NURSING NOTE
"Chief Complaint   Patient presents with     URI       Initial /68   Pulse 84   Temp 97.8  F (36.6  C)   Wt 98 kg (216 lb)   SpO2 97%   BMI 28.50 kg/m   Estimated body mass index is 28.5 kg/m  as calculated from the following:    Height as of 5/1/21: 1.854 m (6' 1\").    Weight as of this encounter: 98 kg (216 lb).  Medication Reconciliation: complete  Rubina Cobian LPN  "

## 2022-06-07 NOTE — TELEPHONE ENCOUNTER
8:23 AM    Reason for Call: OVERBOOK    Patient is having the following symptoms: URI , Sore Throat / Home test negative  For   3 days    The patient is requesting an appointment for  Today  with  Dr. Lopez    Was an appointment offered for this call? No  If yes : Appointment type              Date    Preferred method for responding to this message: Telephone Call  What is your phone number ?  [phone]    If we cannot reach you directly, may we leave a detailed response at the number you provided? Yes    Can this message wait until your PCP/provider returns, if unavailable today? Not applicable,     Ghazala Owen

## 2022-09-09 NOTE — MR AVS SNAPSHOT
"              After Visit Summary   8/28/2017    Jani Langford    MRN: [id number]           Patient Information     Date Of Birth          1980        Visit Information        Provider Department      8/28/2017 8:40 AM Renee Vuong MD [facility]         Follow-ups after your visit        Your next 10 appointments already scheduled     Nov 22, 2017  9:15 AM CST   (Arrive by 9:00 AM)   SHORT with Selwyn Lopez MD   [facility] ([facility] ([facility] )    [address] [place] [address] [facility] [address]   [phone]              Who to contact     If you have questions or need follow up information about today's clinic visit or your schedule please contact [facility] directly at [phone].  Normal or non-critical lab and imaging results will be communicated to you by MyChart, letter or phone within 4 business days after the clinic has received the results. If you do not hear from us within 7 days, please contact the clinic through MyChart or phone. If you have a critical or abnormal lab result, we will notify you by phone as soon as possible.  Submit refill requests through [employer] or call your pharmacy and they will forward the refill request to us. Please allow 3 business days for your refill to be completed.          Additional Information About Your Visit        MyChart Information     [employer] lets you send messages to your doctor, view your test results, renew your prescriptions, schedule appointments and more. To sign up, go to [email or web address] . Click on \"Log in\" on the left side of the screen, which will take you to the Welcome page. Then click on \"Sign up Now\" on the right side of the page.     You will be asked to enter the access code listed below, as well as some personal information. Please follow the directions to create your username and password.     Your access code is: [email or web address]  Expires: 11/20/2017 11:22 AM     Your access code " will  in 90 days. If you need help or a new code, please call your [place] clinic or [phone].        Care EveryWhere ID     This is your Care EveryWhere ID. This could be used by other organizations to access your [place] medical records  [id number]        Your Vitals Were     Pulse Temperature Height BMI (Body Mass Index)          90 97.5  F (36.4  C) (Tympanic) 6' (1.829 m) 27.4 kg/m2         Blood Pressure from Last 3 Encounters:   17 114/72   17 100/64   17 124/70    Weight from Last 3 Encounters:   17 202 lb (91.6 kg)   17 202 lb (91.6 kg)   17 220 lb (99.8 kg)              Today, you had the following     No orders found for display       Primary Care Provider Office Phone # Fax #    R Wilmer Mendez [phone]       [facility] [place] [address] [place], [place] [id number]        Equal Access to Services     [facility]: Hadii aad ku hadasho Soomaali, waaxda luqadaha, qaybta kaalmada adeegyada, oh mcleod . So [facility] [place] [facility] [phone].    ATENCIÓN: Si habla español, tiene a davis disposición servicios gratuitos de asistencia lingüística. Llame al [phone].    We comply with applicable federal civil rights laws and [place] laws. We do not discriminate on the basis of race, color, national origin, age, disability sex, sexual orientation or gender identity.            Thank you!     Thank you for choosing [facility]  for your [facility]. Our goal is always to provide you with excellent care. Hearing back from our patients is one way we can continue to improve our services. Please take a few minutes to complete the written survey that you may receive in the mail after your visit with us. Thank you!             Your Updated Medication List - Protect others around you: Learn how to safely use, store and throw away your medicines at www.disposemymeds.org.          This list is accurate as of: 17  9:16  AM.  Always use your most recent med list.                   Brand Name Dispense Instructions for use Diagnosis    albuterol 108 (90 BASE) MCG/ACT Inhaler    PROAIR HFA/PROVENTIL HFA/VENTOLIN HFA    1 Inhaler    Inhale 2 puffs into the lungs every 6 hours as needed for shortness of breath / dyspnea or wheezing    Dyspnea, unspecified type       omeprazole 20 MG CR capsule    priLOSEC    30 capsule    Take 1 capsule (20 mg) by mouth daily    Gastroesophageal reflux disease without esophagitis       * QUEtiapine 50 MG Tb24 24 hr tablet    SEROQUEL XR    60 each    Take 2 tablets (100 mg) by mouth daily    Bipolar I disorder, most recent episode (or current) manic (H)       * QUEtiapine 300 MG 24 hr tablet    SEROQUEL XR    60 tablet    Take 2 tablets (600 mg) by mouth At Bedtime    Bipolar I disorder, most recent episode (or current) manic (H)       * Notice:  This list has 2 medication(s) that are the same as other medications prescribed for you. Read the directions carefully, and ask your doctor or other care provider to review them with you.       18

## 2022-09-14 ENCOUNTER — HOSPITAL ENCOUNTER (EMERGENCY)
Facility: HOSPITAL | Age: 42
Discharge: HOME | End: 2022-09-14
Attending: NURSE PRACTITIONER | Admitting: NURSE PRACTITIONER
Payer: COMMERCIAL

## 2022-09-14 ENCOUNTER — APPOINTMENT (OUTPATIENT)
Dept: GENERAL RADIOLOGY | Facility: HOSPITAL | Age: 42
End: 2022-09-14
Attending: NURSE PRACTITIONER
Payer: COMMERCIAL

## 2022-09-14 VITALS
TEMPERATURE: 98.7 F | DIASTOLIC BLOOD PRESSURE: 76 MMHG | HEART RATE: 87 BPM | SYSTOLIC BLOOD PRESSURE: 123 MMHG | OXYGEN SATURATION: 96 % | RESPIRATION RATE: 16 BRPM

## 2022-09-14 DIAGNOSIS — M25.562 ACUTE PAIN OF LEFT KNEE: Primary | ICD-10-CM

## 2022-09-14 PROCEDURE — G0463 HOSPITAL OUTPT CLINIC VISIT: HCPCS

## 2022-09-14 PROCEDURE — 99213 OFFICE O/P EST LOW 20 MIN: CPT | Performed by: NURSE PRACTITIONER

## 2022-09-14 PROCEDURE — 73562 X-RAY EXAM OF KNEE 3: CPT | Mod: LT

## 2022-09-14 ASSESSMENT — ENCOUNTER SYMPTOMS
MYALGIAS: 1
CHILLS: 0
JOINT SWELLING: 1
ARTHRALGIAS: 1
FEVER: 0

## 2022-09-14 ASSESSMENT — ACTIVITIES OF DAILY LIVING (ADL): ADLS_ACUITY_SCORE: 35

## 2022-09-14 NOTE — ED TRIAGE NOTES
Patient presents to urgent care with c/o knee pain. States it started about a week ago states he was walking his dog and went for a couple miles. States when he got home his knee was swollen and he used ice and it helpped. States this morning he stood up from his chair and felt a pop and his knee ended up buckling on him. Limited ROM. Wants a xray done. States its a sharp pain on the outer side and numb all over the top of the knee. Pain of 7/10.

## 2022-09-14 NOTE — ED TRIAGE NOTES
Pt presents with c/o left knee pain that started about a week ago, stood up this morning, felt a pop and his knee buckled.

## 2022-09-14 NOTE — ED PROVIDER NOTES
"  History     Chief Complaint   Patient presents with     Knee Pain     HPI  Jani Langford is a 42 year old male who presents to urgent care for evaluation of left knee pain.  About a week ago patient tells me he [place]  This walk normally takes him 45 minutes but on that particular day he tried to do it faster ended just under 30 minutes per his report.  Shortly after the walk he developed swelling and pain to his knee.  He iced and elevated his knee with the swelling improving.  He continues to feel slight pain at to his knee.  At this morning when he tried to get up he felt a \"pop\" sound to his knee and it felt like his knee was trying to buckle out on him.  He has been unable to ambulate as per his usual since this happened.  He also notes that he feels like there is a lump behind his knee.  Denies history of surgeries to this knee.  No direct trauma to the knee.     Allergies:  Allergies   Allergen Reactions     Abilify [Aripiprazole]      Side effects     Bee Venom      Latex Hives     Percocet [Oxycodone-Acetaminophen]      Short of breath vomiting     Toradol      seizure     Tramadol      Short of breath and vomiting     Tegretol [Carbamazepine] Rash       Problem List:    Patient Active Problem List    Diagnosis Date Noted     Anxiety 05/28/2019     Priority: Medium     Patient is followed by  for ongoing prescription of benzodiazepines.  All refills should be approved by this provider, or covering partner.    Medication(s): Klonopin 0.5 and 1 mg.   Maximum quantity per month: 30  Clinic visit frequency required: Q 3 months     Controlled substance agreement on file: Yes       Date(s): 5.24.19  Benzodiazepine use reviewed by psychiatry:  Yes       Date(s): 5.24.19    Last [place] website verification:  done on 5.24.19  [email or web address]           Decreased sex drive 03/14/2017     Priority: Medium     Gastroesophageal reflux disease without " esophagitis 03/14/2017     Priority: Medium     Bipolar disorder with psychotic features (H) 11/25/2016     Priority: Medium     History of mental disorder 10/28/2013     Priority: Medium     Posttraumatic stress disorder 10/28/2013     Priority: Medium     Insomnia 10/25/2013     Priority: Medium     Attention deficit disorder 08/07/2012     Priority: Medium        Past Medical History:    History reviewed. No pertinent past medical history.    Past Surgical History:    Past Surgical History:   Procedure Laterality Date     DENTAL SURGERY      pulled 2 teeth month ago     ENT SURGERY      sinus       Family History:    Family History   Problem Relation Age of Onset     Mental Illness Mother      Depression Mother        Social History:  Marital Status:  Single [1]  Social History     Tobacco Use     Smoking status: Current Every Day Smoker     Packs/day: 0.75     Years: 24.00     Pack years: 18.00     Types: Vaping Device, Other     Start date: 1/1/1995     Last attempt to quit: 3/12/2019     Years since quitting: 3.5     Smokeless tobacco: Never Used     Tobacco comment: vaping sometimes- noted 2/20/20   Substance Use Topics     Alcohol use: Not Currently     Comment: rare     Drug use: Yes     Types: Marijuana     Comment: on medical marijuana         Medications:    albuterol (PROAIR HFA/PROVENTIL HFA/VENTOLIN HFA) 108 (90 Base) MCG/ACT inhaler  atorvastatin (LIPITOR) 40 MG tablet  EPINEPHrine (ANY BX GENERIC EQUIV) 0.3 MG/0.3ML injection 2-pack  medical cannabis XX (Patient's own supply)  Multiple Vitamin (ONE-DAILY MULTI-VITAMIN PO)  omeprazole (PRILOSEC) 20 MG DR capsule  PROTOPIC 0.1 % external ointment  QUEtiapine ER (SEROQUEL XR) 400 MG 24 hr tablet  triamcinolone (ARISTOCORT HP) 0.5 % external cream          Review of Systems   Constitutional: Negative for chills and fever.   Musculoskeletal: Positive for arthralgias, gait problem, joint swelling and myalgias.   All other systems reviewed and are  negative.      Physical Exam   BP: 123/76  Pulse: 87  Temp: 98.7  F (37.1  C)  Resp: 16  SpO2: 96 %      Physical Exam  Vitals and nursing note reviewed.   Constitutional:       Appearance: Normal appearance. He is not ill-appearing or toxic-appearing.   Eyes:      Pupils: Pupils are equal, round, and reactive to light.   Cardiovascular:      Rate and Rhythm: Normal rate.   Pulmonary:      Effort: Pulmonary effort is normal.   Musculoskeletal:         General: No deformity.      Cervical back: Neck supple.      Left knee: No swelling, deformity, effusion, erythema, ecchymosis or crepitus. Decreased range of motion. Tenderness present over the medial joint line, lateral joint line, MCL, LCL, ACL, PCL and patellar tendon. No LCL laxity, MCL laxity, ACL laxity or PCL laxity.Normal alignment.      Instability Tests: Anterior drawer test negative. Posterior drawer test negative. Anterior Lachman test negative. Lateral Robert test positive.      Right lower leg: No edema.      Left lower leg: Normal. No edema.      Comments: Tenderness to left lateral joint line greater than medial.  No obvious deformity to knee.  Unable to palpate lump to posterior left knee.  Full knee flexion.  Knee extension about 160 degrees.   Skin:     General: Skin is warm and dry.      Capillary Refill: Capillary refill takes less than 2 seconds.      Findings: No bruising or erythema.   Neurological:      Mental Status: He is alert and oriented to person, place, and time.         ED Course                 Procedures              Results for orders placed or performed during the hospital encounter of 09/14/22 (from the past 24 hour(s))   XR Knee Left 3 Views    Narrative    Exam: XR KNEE LEFT 3 VIEWS     History:Male, age 42 years, Was walking his dog and had pain. Got out  of chair and knee buckled and popped. Is in a wheel chair.    Comparison:  No relevant prior imaging.    Technique: Three views are submitted.    Findings: Bones are  normally mineralized. No evidence of acute or  subacute fracture.  No evidence of dislocation.           Impression    Impression:  No evidence of acute or subacute bony abnormality.    OLGA HANSON MD         SYSTEM ID:  [id number]       Medications - No data to display    Assessments & Plan (with Medical Decision Making)     I have reviewed the nursing notes.    This is a 42-year-old male that presented for evaluation of left knee pain that initially started 1 week ago after he [place] faster [place] than he normally does.  This morning he feels unstable and he is stiff and difficulty ambulating which prompted today's visit.  Tenderness to entire left knee joint with lateral greater than medial.  Positive Robert's sign.  Mildly decreased knee extension.    Left knee x-ray negative for acute findings at this time.  I discussed all findings with patient.  I suspect injury to meniscus or ligaments.  [facility]  Knee immobilizer and crutches given today.  Continue with Tylenol or ibuprofen as needed for pain as well as icing the knee.  Patient verbalized understanding and is in agreement with plan of care.    I have reviewed the findings, diagnosis, plan and need for follow up with the patient.  This document was prepared using a combination of typing and voice generated software.  While every attempt was made for accuracy, spelling and grammatical errors may exist.    New Prescriptions    No medications on file       Final diagnoses:   Acute pain of left knee       9/14/2022   [facility]     Mpofu, Rosalba, CNP  09/14/22 1126

## 2022-09-23 ENCOUNTER — TELEPHONE (OUTPATIENT)
Dept: PSYCHIATRY | Facility: OTHER | Age: 42
End: 2022-09-23

## 2022-09-23 ENCOUNTER — TRANSFERRED RECORDS (OUTPATIENT)
Dept: HEALTH INFORMATION MANAGEMENT | Facility: CLINIC | Age: 42
End: 2022-09-23

## 2022-09-23 NOTE — TELEPHONE ENCOUNTER
12:01 PM    Reason for Call: Phone Call    Description: Patient wondering if he can get a letter stating he needs his dog for depression. Please call him regarding this     Was an appointment offered for this call? No    Preferred method for responding to this message: Telephone Call  What is your phone number ?[phone]    If we cannot reach you directly, may we leave a detailed response at the number you provided? Yes    Can this message wait until your PCP/provider returns, if available today? Justina Avilez

## 2022-09-23 NOTE — TELEPHONE ENCOUNTER
Patient contacted.  He would like a letter for an emotional support animal (his dog)  Explained to Jani that Dr. Vuong is out of the office until Oct 3, 2022.  Patient is ok with this.  So, will wait until Dr. Vuong returns to office before proceeding.  At that time, will mail letter to his address listed in Epic.  Will also call patient when we send letter out.

## 2022-09-26 ENCOUNTER — MEDICAL CORRESPONDENCE (OUTPATIENT)
Dept: MRI IMAGING | Facility: HOSPITAL | Age: 42
End: 2022-09-26

## 2022-10-03 ENCOUNTER — HOSPITAL ENCOUNTER (OUTPATIENT)
Dept: MRI IMAGING | Facility: HOSPITAL | Age: 42
Discharge: HOME | End: 2022-10-03
Attending: ORTHOPAEDIC SURGERY | Admitting: ORTHOPAEDIC SURGERY
Payer: COMMERCIAL

## 2022-10-03 DIAGNOSIS — M25.562 LEFT KNEE PAIN: ICD-10-CM

## 2022-10-03 PROCEDURE — 73721 MRI JNT OF LWR EXTRE W/O DYE: CPT | Mod: LT

## 2022-10-04 ENCOUNTER — TELEPHONE (OUTPATIENT)
Dept: PSYCHIATRY | Facility: OTHER | Age: 42
End: 2022-10-04

## 2022-10-04 NOTE — TELEPHONE ENCOUNTER
Patient contacted and notified Dr. Vuong completed and will be mailed out and to expect in the mail within the week.  Patient is ok with this

## 2022-10-06 NOTE — PATIENT INSTRUCTIONS
Preparing for Your Surgery  Getting started  A nurse will call you to review your health history and instructions. They will give you an arrival time based on your scheduled surgery time. Please be ready to share:    Your doctor's clinic name and phone number    Your medical, surgical and anesthesia history    A list of allergies and sensitivities    A list of medicines, including herbal treatments and over-the-counter drugs    Whether the patient has a legal guardian (ask how to send us the papers in advance)  Please tell us if you're pregnant--or if there's any chance you might be pregnant. Some surgeries may injure a fetus (unborn baby), so they require a pregnancy test. Surgeries that are safe for a fetus don't always need a test, and you can choose whether to have one.   If you have a child who's having surgery, please ask for a copy of Preparing for Your Child's Surgery.    Preparing for surgery    Within 10 to 30 days of surgery: Have a pre-op exam (sometimes called an H&P, or History and Physical). This can be done at a clinic or pre-operative center.  ? If you're having a , you may not need this exam. Talk to your care team.    At your pre-op exam, talk to your care team about all medicines you take. If you need to stop any medicines before surgery, ask when to start taking them again.  ? We do this for your safety. Many medicines can make you bleed too much during surgery. Some change how well surgery (anesthesia) drugs work.    Call your insurance company to let them know you're having surgery. (If you don't have insurance, call [phone].)    Call your clinic if there's any change in your health. This includes signs of a cold or flu (sore throat, runny nose, cough, rash, fever). It also includes a scrape or scratch near the surgery site.    If you have questions on the day of surgery, call your hospital or surgery center.  COVID testing  You may need to be tested for COVID-19 before having  surgery. If so, we will give you instructions (or click here).  Eating and drinking guidelines  For your safety: Unless your surgeon tells you otherwise, follow the guidelines below.    Eat and drink as usual until 8 hours before surgery. After that, no food or milk.    Drink clear liquids until 2 hours before surgery. These are liquids you can see through, like water, Gatorade and Propel Water. You may also have black coffee and tea (no cream or milk).    Nothing by mouth within 2 hours of surgery. This includes gum, candy and breath mints.    If you drink alcohol: Stop drinking it the night before surgery.    If your care team tells you to take medicine on the morning of surgery, it's okay to take it with a sip of water.  Preventing infection    Shower or bathe the night before and morning of your surgery. Follow the instructions your clinic gave you. (If no instructions, use regular soap.)    Don't shave or clip hair near your surgery site. We'll remove the hair if needed.    Don't smoke or vape the morning of surgery. You may chew nicotine gum up to 2 hours before surgery. A nicotine patch is okay.  ? Note: Some surgeries require you to completely quit smoking and nicotine. Check with your surgeon.    Your care team will make every effort to keep you safe from infection. We will:  ? Clean our hands often with soap and water (or an alcohol-based hand rub).  ? Clean the skin at your surgery site with a special soap that kills germs.  ? Give you a special gown to keep you warm. (Cold raises the risk of infection.)  ? Wear special hair covers, masks, gowns and gloves during surgery.  ? Give antibiotic medicine, if prescribed. Not all surgeries need antibiotics.  What to bring on the day of surgery    Photo ID and insurance card    Copy of your health care directive, if you have one    Glasses and hearing aides (bring cases)  ? You can't wear contacts during surgery    Inhaler and eye drops, if you use them (tell us  about these when you arrive)    CPAP machine or breathing device, if you use them    A few personal items, if spending the night    If you have . . .  ? A pacemaker, ICD (cardiac defibrillator) or other implant: Bring the ID card.  ? An implanted stimulator: Bring the remote control.  ? A legal guardian: Bring a copy of the certified (court-stamped) guardianship papers.  Please remove any jewelry, including body piercings. Leave jewelry and other valuables at home.  If you're going home the day of surgery    You must have a responsible adult drive you home. They should stay with you overnight as well.    If you don't have someone to stay with you, and you aren't safe to go home alone, we may keep you overnight. Insurance often won't pay for this.  After surgery  If it's hard to control your pain or you need more pain medicine, please call your surgeon's office.  Questions?   If you have any questions for your care team, list them here: _________________________________________________________________________________________________________________________________________________________________________ ____________________________________ ____________________________________ ____________________________________  For informational purposes only. Not to replace the advice of your health care provider. Copyright   2003, 2019 [facility]. All rights reserved. Clinically reviewed by Gissell Davila MD. [employer] [id number] - REV 07/22.

## 2022-10-06 NOTE — PROGRESS NOTES
[address]  [facility] [address]  Phone: [phone]  Primary Provider: Rubi Gomez  Pre-op Performing Provider: RUBI GOMEZ      PREOPERATIVE EVALUATION:  Today's date: 10/17/2022    Jani Langford is a 42 year old male who presents for a preoperative evaluation.    Surgical Information:  Surgery/Procedure: left knee arthroscopy, loose body removal   Surgery Location: [facility]   Surgeon: Dr Field  Surgery Date: 10/21/22  Time of Surgery: TBD  Where patient plans to recover: At home alone  Fax number for surgical facility: [phone]    Type of Anesthesia Anticipated: to be determined    Assessment & Plan     The proposed surgical procedure is considered LOW risk.    Preop general physical exam  Doing well.  No issues with upcoming procedure.    - Basic metabolic panel; Future  - CBC with Platelets & Differential; Future  - CBC with Platelets & Differential  - Basic metabolic panel  - EKG 12-lead complete w/read - (Clinic Performed)    Left knee pain, unspecified chronicity  Upcoming surgery.    - EKG 12-lead complete w/read - (Clinic Performed)    Possible Sleep Apnea:           Risks and Recommendations:  The patient has the following additional risks and recommendations for perioperative complications:   - No identified additional risk factors other than previously addressed        RECOMMENDATION:  APPROVAL GIVEN to proceed with proposed procedure, without further diagnostic evaluation.                      Subjective     HPI related to upcoming procedure: left knee loose body.       Preop Questions 10/17/2022   1. Have you ever had a heart attack or stroke? No   2. Have you ever had surgery on your heart or blood vessels, such as a stent placement, a coronary artery bypass, or surgery on an artery in your head, neck, heart, or legs? No   3. Do you have chest pain with activity? No   4. Do you have a history of  heart failure? No   5. Do you  currently have a cold, bronchitis or symptoms of other infection? No   6. Do you have a cough, shortness of breath, or wheezing? No   7. Do you or anyone in your family have previous history of blood clots? No   8. Do you or does anyone in your family have a serious bleeding problem such as prolonged bleeding following surgeries or cuts? No   9. Have you ever had problems with anemia or been told to take iron pills? No   10. Have you had any abnormal blood loss such as black, tarry or bloody stools? No   11. Have you ever had a blood transfusion? No   12. Are you willing to have a blood transfusion if it is medically needed before, during, or after your surgery? Yes   13. Have you or any of your relatives ever had problems with anesthesia? YES - pt states he can wake up violent    14. Do you have sleep apnea, excessive snoring or daytime drowsiness? YES -    14a. Do you have a CPAP machine? No   15. Do you have any artifical heart valves or other implanted medical devices like a pacemaker, defibrillator, or continuous glucose monitor? No   16. Do you have artificial joints? No   17. Are you allergic to latex? YES:      Health Care Directive:  Patient does not have a Health Care Directive or Living Will: Discussed advance care planning with patient; however, patient declined at this time.    Preoperative Review of :   reviewed - no record of controlled substances prescribed.      Status of Chronic Conditions:  See problem list for active medical problems.  Problems all longstanding and stable, except as noted/documented.  See ROS for pertinent symptoms related to these conditions.      Review of Systems  CONSTITUTIONAL: NEGATIVE for fever, chills, change in weight  INTEGUMENTARY/SKIN: NEGATIVE for worrisome rashes, moles or lesions  EYES: NEGATIVE for vision changes or irritation  ENT/MOUTH: NEGATIVE for ear, mouth and throat problems  RESP: NEGATIVE for significant cough or SOB  CV: NEGATIVE for chest pain,  palpitations or peripheral edema  GI: NEGATIVE for nausea, abdominal pain, heartburn, or change in bowel habits  : NEGATIVE for frequency, dysuria, or hematuria  MUSCULOSKELETAL: NEGATIVE for significant arthralgias or myalgia  NEURO: NEGATIVE for weakness, dizziness or paresthesias  ENDOCRINE: NEGATIVE for temperature intolerance, skin/hair changes  HEME: NEGATIVE for bleeding problems  PSYCHIATRIC: NEGATIVE for changes in mood or affect    Patient Active Problem List    Diagnosis Date Noted     Anxiety 05/28/2019     Priority: Medium     Patient is followed by  for ongoing prescription of benzodiazepines.  All refills should be approved by this provider, or covering partner.    Medication(s): Klonopin 0.5 and 1 mg.   Maximum quantity per month: 30  Clinic visit frequency required: Q 3 months     Controlled substance agreement on file: Yes       Date(s): 5.24.19  Benzodiazepine use reviewed by psychiatry:  Yes       Date(s): 5.24.19    Last [place] website verification:  done on 5.24.19  [email or web address]           Decreased sex drive 03/14/2017     Priority: Medium     Gastroesophageal reflux disease without esophagitis 03/14/2017     Priority: Medium     Bipolar disorder with psychotic features (H) 11/25/2016     Priority: Medium     History of mental disorder 10/28/2013     Priority: Medium     Posttraumatic stress disorder 10/28/2013     Priority: Medium     Insomnia 10/25/2013     Priority: Medium     Attention deficit disorder 08/07/2012     Priority: Medium      No past medical history on file.  Past Surgical History:   Procedure Laterality Date     DENTAL SURGERY      pulled 2 teeth month ago     ENT SURGERY      sinus     Current Outpatient Medications   Medication Sig Dispense Refill     albuterol (PROAIR HFA/PROVENTIL HFA/VENTOLIN HFA) 108 (90 Base) MCG/ACT inhaler Inhale 2 puffs into the lungs every 6 hours as needed for shortness of breath / dyspnea or wheezing 18 g 0      atorvastatin (LIPITOR) 40 MG tablet TAKE 1 TABLET (40 MG) BY MOUTH DAILY 90 tablet 1     EPINEPHrine (ANY BX GENERIC EQUIV) 0.3 MG/0.3ML injection 2-pack INJECT 0.3 MLS (0.3 MG) INTO THE MUSCLE ONCE FOR 1 DOSE       medical cannabis XX (Patient's own supply) Take by mouth See Admin Instructions From [facility]       Multiple Vitamin (ONE-DAILY MULTI-VITAMIN PO)        omeprazole (PRILOSEC) 20 MG DR capsule Take 1 capsule (20 mg) by mouth daily 90 capsule 3     PROTOPIC 0.1 % external ointment Apply topically 2 times daily 60 g 0     QUEtiapine ER (SEROQUEL XR) 400 MG 24 hr tablet Take 1 tablet (400 mg) by mouth At Bedtime 90 tablet 3     triamcinolone (ARISTOCORT HP) 0.5 % external cream Apply topically 2 times daily 454 g 0       Allergies   Allergen Reactions     Abilify [Aripiprazole]      Side effects     Bee Venom      Latex Hives     Percocet [Oxycodone-Acetaminophen]      Short of breath vomiting     Toradol      seizure     Tramadol      Short of breath and vomiting     Tegretol [Carbamazepine] Rash        Social History     Tobacco Use     Smoking status: Every Day     Packs/day: 0.75     Years: 24.00     Pack years: 18.00     Types: Vaping Device, Other, Cigarettes     Start date: 1/1/1995     Last attempt to quit: 3/12/2019     Years since quitting: 3.6     Smokeless tobacco: Never     Tobacco comments:     vaping sometimes- noted 2/20/20   Substance Use Topics     Alcohol use: Not Currently     Comment: rare     Family History   Problem Relation Age of Onset     Mental Illness Mother      Depression Mother      History   Drug Use     Types: Marijuana     Comment: on medical marijuana          Objective     BP 98/66   Pulse 79   Temp 98.4  F (36.9  C)   Wt 93 kg (205 lb)   SpO2 96%   BMI 27.05 kg/m      Physical Exam    GENERAL APPEARANCE: healthy, alert and no distress     EYES: EOMI,  PERRL     HENT: ear canals and TM's normal and nose and mouth without ulcers or lesions     NECK: no adenopathy, no  asymmetry, masses, or scars and thyroid normal to palpation     RESP: lungs clear to auscultation - no rales, rhonchi or wheezes     CV: regular rates and rhythm, normal S1 S2, no S3 or S4 and no murmur, click or rub     ABDOMEN:  soft, nontender, no HSM or masses and bowel sounds normal     MS: extremities normal- no gross deformities noted, no evidence of inflammation in joints, FROM in all extremities.     SKIN: no suspicious lesions or rashes     NEURO: Normal strength and tone, sensory exam grossly normal, mentation intact and speech normal     PSYCH: mentation appears normal. and affect normal/bright     LYMPHATICS: No cervical adenopathy    Recent Labs   Lab Test 04/29/22  0847 11/29/21  0842 11/22/21  0800 05/01/21  2248   HGB  --  15.3  --  15.9   PLT  --  160  --  244   INR  --  0.99  --   --     138   < > 138   POTASSIUM 3.7 3.9   < > 3.5   CR 0.93 0.93   < > 1.19    < > = values in this interval not displayed.        Diagnostics:  Cbc and bmp pending.     EKG: appears normal, NSR, normal axis, normal intervals, no acute ST/T changes c/w ischemia, no LVH by voltage criteria, unchanged from previous tracings    Revised Cardiac Risk Index (RCRI):  The patient has the following serious cardiovascular risks for perioperative complications:   - No serious cardiac risks = 0 points     RCRI Interpretation: 0 points: Class I (very low risk - 0.4% complication rate)           Signed Electronically by: Selwyn Lopez MD  Copy of this evaluation report is provided to requesting physician.

## 2022-10-17 ENCOUNTER — OFFICE VISIT (OUTPATIENT)
Dept: FAMILY MEDICINE | Facility: OTHER | Age: 42
End: 2022-10-17
Attending: FAMILY MEDICINE
Payer: COMMERCIAL

## 2022-10-17 VITALS
BODY MASS INDEX: 27.05 KG/M2 | DIASTOLIC BLOOD PRESSURE: 66 MMHG | SYSTOLIC BLOOD PRESSURE: 98 MMHG | WEIGHT: 205 LBS | OXYGEN SATURATION: 96 % | TEMPERATURE: 98.4 F | HEART RATE: 79 BPM

## 2022-10-17 DIAGNOSIS — M25.562 LEFT KNEE PAIN, UNSPECIFIED CHRONICITY: ICD-10-CM

## 2022-10-17 DIAGNOSIS — Z01.818 PREOP GENERAL PHYSICAL EXAM: Primary | ICD-10-CM

## 2022-10-17 LAB
ANION GAP SERPL CALCULATED.3IONS-SCNC: 9 MMOL/L (ref 7–15)
BASOPHILS # BLD AUTO: 0 10E3/UL (ref 0–0.2)
BASOPHILS NFR BLD AUTO: 0 %
BUN SERPL-MCNC: 6.5 MG/DL (ref 6–20)
CALCIUM SERPL-MCNC: 9.5 MG/DL (ref 8.6–10)
CHLORIDE SERPL-SCNC: 100 MMOL/L (ref 98–107)
CREAT SERPL-MCNC: 0.92 MG/DL (ref 0.67–1.17)
DEPRECATED HCO3 PLAS-SCNC: 27 MMOL/L (ref 22–29)
EOSINOPHIL # BLD AUTO: 0.1 10E3/UL (ref 0–0.7)
EOSINOPHIL NFR BLD AUTO: 1 %
ERYTHROCYTE [DISTWIDTH] IN BLOOD BY AUTOMATED COUNT: 12 % (ref 10–15)
GFR SERPL CREATININE-BSD FRML MDRD: >90 ML/MIN/1.73M2
GLUCOSE SERPL-MCNC: 101 MG/DL (ref 70–99)
HCT VFR BLD AUTO: 46.3 % (ref 40–53)
HGB BLD-MCNC: 15.9 G/DL (ref 13.3–17.7)
LYMPHOCYTES # BLD AUTO: 1.9 10E3/UL (ref 0.8–5.3)
LYMPHOCYTES NFR BLD AUTO: 24 %
MCH RBC QN AUTO: 30.8 PG (ref 26.5–33)
MCHC RBC AUTO-ENTMCNC: 34.3 G/DL (ref 31.5–36.5)
MCV RBC AUTO: 90 FL (ref 78–100)
MONOCYTES # BLD AUTO: 0.6 10E3/UL (ref 0–1.3)
MONOCYTES NFR BLD AUTO: 8 %
NEUTROPHILS # BLD AUTO: 5.1 10E3/UL (ref 1.6–8.3)
NEUTROPHILS NFR BLD AUTO: 67 %
PLATELET # BLD AUTO: 235 10E3/UL (ref 150–450)
POTASSIUM SERPL-SCNC: 4.3 MMOL/L (ref 3.4–5.3)
RBC # BLD AUTO: 5.17 10E6/UL (ref 4.4–5.9)
SODIUM SERPL-SCNC: 136 MMOL/L (ref 136–145)
WBC # BLD AUTO: 7.6 10E3/UL (ref 4–11)

## 2022-10-17 PROCEDURE — 99214 OFFICE O/P EST MOD 30 MIN: CPT | Mod: 25 | Performed by: FAMILY MEDICINE

## 2022-10-17 PROCEDURE — 80048 BASIC METABOLIC PNL TOTAL CA: CPT | Mod: ZL | Performed by: FAMILY MEDICINE

## 2022-10-17 PROCEDURE — 93010 ELECTROCARDIOGRAM REPORT: CPT | Performed by: INTERNAL MEDICINE

## 2022-10-17 PROCEDURE — G0463 HOSPITAL OUTPT CLINIC VISIT: HCPCS

## 2022-10-17 PROCEDURE — 93005 ELECTROCARDIOGRAM TRACING: CPT

## 2022-10-17 PROCEDURE — 36415 COLL VENOUS BLD VENIPUNCTURE: CPT | Mod: ZL | Performed by: FAMILY MEDICINE

## 2022-10-17 PROCEDURE — 85025 COMPLETE CBC W/AUTO DIFF WBC: CPT | Mod: ZL | Performed by: FAMILY MEDICINE

## 2022-10-17 ASSESSMENT — PAIN SCALES - GENERAL: PAINLEVEL: MODERATE PAIN (5)

## 2022-10-17 NOTE — NURSING NOTE
"Chief Complaint   Patient presents with     Pre-Op Exam       Initial BP 98/66   Pulse 79   Temp 98.4  F (36.9  C)   Wt 93 kg (205 lb)   SpO2 96%   BMI 27.05 kg/m   Estimated body mass index is 27.05 kg/m  as calculated from the following:    Height as of 5/1/21: 1.854 m (6' 1\").    Weight as of this encounter: 93 kg (205 lb).  Medication Reconciliation: complete  Rubina Cobian LPN  "

## 2022-10-21 DIAGNOSIS — E78.2 MIXED HYPERLIPIDEMIA: ICD-10-CM

## 2022-10-21 RX ORDER — ATORVASTATIN CALCIUM 40 MG/1
40 TABLET, FILM COATED ORAL DAILY
Qty: 90 TABLET | Refills: 1 | Status: SHIPPED | OUTPATIENT
Start: 2022-10-21 | End: 2023-05-15

## 2022-11-01 DIAGNOSIS — K21.9 GASTROESOPHAGEAL REFLUX DISEASE WITHOUT ESOPHAGITIS: ICD-10-CM

## 2022-11-04 ENCOUNTER — TRANSFERRED RECORDS (OUTPATIENT)
Dept: HEALTH INFORMATION MANAGEMENT | Facility: CLINIC | Age: 42
End: 2022-11-04

## 2022-11-16 ENCOUNTER — HOSPITAL ENCOUNTER (EMERGENCY)
Facility: HOSPITAL | Age: 42
Discharge: HOME | End: 2022-11-16
Attending: PHYSICIAN ASSISTANT | Admitting: PHYSICIAN ASSISTANT
Payer: COMMERCIAL

## 2022-11-16 ENCOUNTER — APPOINTMENT (OUTPATIENT)
Dept: CT IMAGING | Facility: HOSPITAL | Age: 42
End: 2022-11-16
Attending: PHYSICIAN ASSISTANT
Payer: COMMERCIAL

## 2022-11-16 VITALS
DIASTOLIC BLOOD PRESSURE: 92 MMHG | HEIGHT: 73 IN | TEMPERATURE: 99 F | RESPIRATION RATE: 24 BRPM | WEIGHT: 205 LBS | BODY MASS INDEX: 27.17 KG/M2 | OXYGEN SATURATION: 99 % | SYSTOLIC BLOOD PRESSURE: 121 MMHG | HEART RATE: 77 BPM

## 2022-11-16 DIAGNOSIS — K29.70 GASTRITIS: ICD-10-CM

## 2022-11-16 DIAGNOSIS — R11.2 NAUSEA WITH VOMITING: ICD-10-CM

## 2022-11-16 LAB
ALBUMIN SERPL BCG-MCNC: 5.4 G/DL (ref 3.5–5.2)
ALBUMIN UR-MCNC: NEGATIVE MG/DL
ALP SERPL-CCNC: 154 U/L (ref 40–129)
ALT SERPL W P-5'-P-CCNC: 66 U/L (ref 10–50)
ANION GAP SERPL CALCULATED.3IONS-SCNC: 19 MMOL/L (ref 7–15)
APPEARANCE UR: CLEAR
AST SERPL W P-5'-P-CCNC: 37 U/L (ref 10–50)
BASOPHILS # BLD AUTO: 0 10E3/UL (ref 0–0.2)
BASOPHILS NFR BLD AUTO: 0 %
BILIRUB SERPL-MCNC: 0.9 MG/DL
BILIRUB UR QL STRIP: NEGATIVE
BUN SERPL-MCNC: 10.6 MG/DL (ref 6–20)
CALCIUM SERPL-MCNC: 10.8 MG/DL (ref 8.6–10)
CHLORIDE SERPL-SCNC: 100 MMOL/L (ref 98–107)
COLOR UR AUTO: ABNORMAL
CREAT SERPL-MCNC: 0.93 MG/DL (ref 0.67–1.17)
CRP SERPL-MCNC: <3 MG/L
DEPRECATED HCO3 PLAS-SCNC: 22 MMOL/L (ref 22–29)
EOSINOPHIL # BLD AUTO: 0 10E3/UL (ref 0–0.7)
EOSINOPHIL NFR BLD AUTO: 0 %
ERYTHROCYTE [DISTWIDTH] IN BLOOD BY AUTOMATED COUNT: 11.7 % (ref 10–15)
FLUAV RNA SPEC QL NAA+PROBE: NEGATIVE
FLUBV RNA RESP QL NAA+PROBE: NEGATIVE
GFR SERPL CREATININE-BSD FRML MDRD: >90 ML/MIN/1.73M2
GLUCOSE SERPL-MCNC: 128 MG/DL (ref 70–99)
GLUCOSE UR STRIP-MCNC: NEGATIVE MG/DL
HCT VFR BLD AUTO: 50.1 % (ref 40–53)
HGB BLD-MCNC: 17.6 G/DL (ref 13.3–17.7)
HGB UR QL STRIP: NEGATIVE
HOLD SPECIMEN: NORMAL
IMM GRANULOCYTES # BLD: 0.1 10E3/UL
IMM GRANULOCYTES NFR BLD: 1 %
INR PPP: 1.02 (ref 0.85–1.15)
KETONES UR STRIP-MCNC: ABNORMAL MG/DL
LACTATE SERPL-SCNC: 1.5 MMOL/L (ref 0.7–2)
LACTATE SERPL-SCNC: 2.4 MMOL/L (ref 0.7–2)
LACTATE SERPL-SCNC: 4.7 MMOL/L (ref 0.7–2)
LEUKOCYTE ESTERASE UR QL STRIP: NEGATIVE
LYMPHOCYTES # BLD AUTO: 1.7 10E3/UL (ref 0.8–5.3)
LYMPHOCYTES NFR BLD AUTO: 14 %
MAGNESIUM SERPL-MCNC: 1.7 MG/DL (ref 1.7–2.3)
MCH RBC QN AUTO: 31.7 PG (ref 26.5–33)
MCHC RBC AUTO-ENTMCNC: 35.1 G/DL (ref 31.5–36.5)
MCV RBC AUTO: 90 FL (ref 78–100)
MONOCYTES # BLD AUTO: 0.7 10E3/UL (ref 0–1.3)
MONOCYTES NFR BLD AUTO: 5 %
NEUTROPHILS # BLD AUTO: 9.7 10E3/UL (ref 1.6–8.3)
NEUTROPHILS NFR BLD AUTO: 80 %
NITRATE UR QL: NEGATIVE
NRBC # BLD AUTO: 0 10E3/UL
NRBC BLD AUTO-RTO: 0 /100
PH UR STRIP: 7 [PH] (ref 4.7–8)
PLATELET # BLD AUTO: 237 10E3/UL (ref 150–450)
POTASSIUM SERPL-SCNC: 3.7 MMOL/L (ref 3.4–5.3)
PROCALCITONIN SERPL IA-MCNC: 0.03 NG/ML
PROT SERPL-MCNC: 8.5 G/DL (ref 6.4–8.3)
RBC # BLD AUTO: 5.56 10E6/UL (ref 4.4–5.9)
RBC URINE: <1 /HPF
RSV RNA SPEC NAA+PROBE: NEGATIVE
SARS-COV-2 RNA RESP QL NAA+PROBE: NEGATIVE
SODIUM SERPL-SCNC: 141 MMOL/L (ref 136–145)
SP GR UR STRIP: 1 (ref 1–1.03)
SQUAMOUS EPITHELIAL: 0 /HPF
UROBILINOGEN UR STRIP-MCNC: NORMAL MG/DL
WBC # BLD AUTO: 12.2 10E3/UL (ref 4–11)
WBC URINE: <1 /HPF

## 2022-11-16 PROCEDURE — 80053 COMPREHEN METABOLIC PANEL: CPT | Performed by: PHYSICIAN ASSISTANT

## 2022-11-16 PROCEDURE — 99285 EMERGENCY DEPT VISIT HI MDM: CPT | Mod: 25,CS

## 2022-11-16 PROCEDURE — 74177 CT ABD & PELVIS W/CONTRAST: CPT

## 2022-11-16 PROCEDURE — 85610 PROTHROMBIN TIME: CPT | Performed by: PHYSICIAN ASSISTANT

## 2022-11-16 PROCEDURE — 250N000011 HC RX IP 250 OP 636: Performed by: PHYSICIAN ASSISTANT

## 2022-11-16 PROCEDURE — 36415 COLL VENOUS BLD VENIPUNCTURE: CPT | Performed by: STUDENT IN AN ORGANIZED HEALTH CARE EDUCATION/TRAINING PROGRAM

## 2022-11-16 PROCEDURE — 87637 SARSCOV2&INF A&B&RSV AMP PRB: CPT | Performed by: PHYSICIAN ASSISTANT

## 2022-11-16 PROCEDURE — 85025 COMPLETE CBC W/AUTO DIFF WBC: CPT | Performed by: PHYSICIAN ASSISTANT

## 2022-11-16 PROCEDURE — 81001 URINALYSIS AUTO W/SCOPE: CPT | Performed by: PHYSICIAN ASSISTANT

## 2022-11-16 PROCEDURE — 36415 COLL VENOUS BLD VENIPUNCTURE: CPT | Performed by: PHYSICIAN ASSISTANT

## 2022-11-16 PROCEDURE — C9803 HOPD COVID-19 SPEC COLLECT: HCPCS

## 2022-11-16 PROCEDURE — 84145 PROCALCITONIN (PCT): CPT | Performed by: PHYSICIAN ASSISTANT

## 2022-11-16 PROCEDURE — 96375 TX/PRO/DX INJ NEW DRUG ADDON: CPT

## 2022-11-16 PROCEDURE — 83735 ASSAY OF MAGNESIUM: CPT | Performed by: PHYSICIAN ASSISTANT

## 2022-11-16 PROCEDURE — 80053 COMPREHEN METABOLIC PANEL: CPT | Performed by: STUDENT IN AN ORGANIZED HEALTH CARE EDUCATION/TRAINING PROGRAM

## 2022-11-16 PROCEDURE — 96374 THER/PROPH/DIAG INJ IV PUSH: CPT | Mod: XU

## 2022-11-16 PROCEDURE — 96361 HYDRATE IV INFUSION ADD-ON: CPT

## 2022-11-16 PROCEDURE — 258N000003 HC RX IP 258 OP 636: Performed by: PHYSICIAN ASSISTANT

## 2022-11-16 PROCEDURE — 99284 EMERGENCY DEPT VISIT MOD MDM: CPT | Mod: CS | Performed by: PHYSICIAN ASSISTANT

## 2022-11-16 PROCEDURE — 85014 HEMATOCRIT: CPT | Performed by: STUDENT IN AN ORGANIZED HEALTH CARE EDUCATION/TRAINING PROGRAM

## 2022-11-16 PROCEDURE — 82040 ASSAY OF SERUM ALBUMIN: CPT | Performed by: STUDENT IN AN ORGANIZED HEALTH CARE EDUCATION/TRAINING PROGRAM

## 2022-11-16 PROCEDURE — 250N000011 HC RX IP 250 OP 636: Performed by: STUDENT IN AN ORGANIZED HEALTH CARE EDUCATION/TRAINING PROGRAM

## 2022-11-16 PROCEDURE — 83605 ASSAY OF LACTIC ACID: CPT | Mod: 91 | Performed by: PHYSICIAN ASSISTANT

## 2022-11-16 PROCEDURE — 86140 C-REACTIVE PROTEIN: CPT | Performed by: PHYSICIAN ASSISTANT

## 2022-11-16 RX ORDER — IOPAMIDOL 755 MG/ML
100 INJECTION, SOLUTION INTRAVASCULAR ONCE
Status: COMPLETED | OUTPATIENT
Start: 2022-11-16 | End: 2022-11-16

## 2022-11-16 RX ORDER — ONDANSETRON 4 MG/1
4 TABLET, ORALLY DISINTEGRATING ORAL ONCE
Status: COMPLETED | OUTPATIENT
Start: 2022-11-16 | End: 2022-11-16

## 2022-11-16 RX ORDER — ONDANSETRON 4 MG/1
4 TABLET, ORALLY DISINTEGRATING ORAL EVERY 6 HOURS PRN
Qty: 20 TABLET | Refills: 0 | Status: SHIPPED | OUTPATIENT
Start: 2022-11-16

## 2022-11-16 RX ORDER — SODIUM CHLORIDE 9 MG/ML
INJECTION, SOLUTION INTRAVENOUS CONTINUOUS
Status: DISCONTINUED | OUTPATIENT
Start: 2022-11-16 | End: 2022-11-16 | Stop reason: HOSPADM

## 2022-11-16 RX ORDER — ONDANSETRON 4 MG/1
4 TABLET, ORALLY DISINTEGRATING ORAL ONCE
Status: DISCONTINUED | OUTPATIENT
Start: 2022-11-16 | End: 2022-11-16 | Stop reason: HOSPADM

## 2022-11-16 RX ORDER — IOPAMIDOL 755 MG/ML
100 INJECTION, SOLUTION INTRAVASCULAR ONCE
Status: DISCONTINUED | OUTPATIENT
Start: 2022-11-16 | End: 2022-11-16

## 2022-11-16 RX ORDER — METOCLOPRAMIDE HYDROCHLORIDE 5 MG/ML
10 INJECTION INTRAMUSCULAR; INTRAVENOUS ONCE
Status: COMPLETED | OUTPATIENT
Start: 2022-11-16 | End: 2022-11-16

## 2022-11-16 RX ORDER — METOCLOPRAMIDE 10 MG/1
10 TABLET ORAL 3 TIMES DAILY PRN
Qty: 20 TABLET | Refills: 0 | Status: SHIPPED | OUTPATIENT
Start: 2022-11-16

## 2022-11-16 RX ORDER — LORAZEPAM 2 MG/ML
0.5 INJECTION INTRAMUSCULAR EVERY 30 MIN PRN
Status: DISCONTINUED | OUTPATIENT
Start: 2022-11-16 | End: 2022-11-16 | Stop reason: HOSPADM

## 2022-11-16 RX ORDER — LORAZEPAM 2 MG/ML
1 INJECTION INTRAMUSCULAR ONCE
Status: DISCONTINUED | OUTPATIENT
Start: 2022-11-16 | End: 2022-11-16 | Stop reason: HOSPADM

## 2022-11-16 RX ORDER — ONDANSETRON 2 MG/ML
4 INJECTION INTRAMUSCULAR; INTRAVENOUS
Status: COMPLETED | OUTPATIENT
Start: 2022-11-16 | End: 2022-11-16

## 2022-11-16 RX ADMIN — SODIUM CHLORIDE 1000 ML: 9 INJECTION, SOLUTION INTRAVENOUS at 18:18

## 2022-11-16 RX ADMIN — METOCLOPRAMIDE HYDROCHLORIDE 10 MG: 5 INJECTION INTRAMUSCULAR; INTRAVENOUS at 19:51

## 2022-11-16 RX ADMIN — ONDANSETRON 4 MG: 2 INJECTION INTRAMUSCULAR; INTRAVENOUS at 17:24

## 2022-11-16 RX ADMIN — SODIUM CHLORIDE 1000 ML: 9 INJECTION, SOLUTION INTRAVENOUS at 16:38

## 2022-11-16 RX ADMIN — LORAZEPAM 0.5 MG: 2 INJECTION INTRAMUSCULAR; INTRAVENOUS at 17:29

## 2022-11-16 RX ADMIN — ONDANSETRON 4 MG: 4 TABLET, ORALLY DISINTEGRATING ORAL at 15:10

## 2022-11-16 RX ADMIN — IOPAMIDOL 100 ML: 755 INJECTION, SOLUTION INTRAVENOUS at 17:17

## 2022-11-16 ASSESSMENT — ACTIVITIES OF DAILY LIVING (ADL)
ADLS_ACUITY_SCORE: 35
ADLS_ACUITY_SCORE: 35

## 2022-11-16 ASSESSMENT — ENCOUNTER SYMPTOMS
FACIAL SWELLING: 0
TREMORS: 0
FACIAL ASYMMETRY: 0
NERVOUS/ANXIOUS: 1
BACK PAIN: 0
ABDOMINAL PAIN: 1
SEIZURES: 0
AGITATION: 1
APPETITE CHANGE: 1
NAUSEA: 1
FLANK PAIN: 0
COUGH: 0
VOMITING: 1
CONFUSION: 0
ACTIVITY CHANGE: 1
SHORTNESS OF BREATH: 0
FEVER: 0
EYE REDNESS: 0
CONSTIPATION: 0
CHILLS: 0

## 2022-11-16 NOTE — DISCHARGE INSTRUCTIONS
What to expect when you have contrast    During your exam, we will inject  contrast  into your vein or artery. (Contrast is a clear liquid with iodine in it. It shows up on X-rays.)    You may feel warm or hot. You may have a metal taste in your mouth and a slight upset stomach. You may also feel pressure near the kidneys and bladder. These effects will last about 1 to 3 minutes.    Please tell us if you have:   Sneezing    Itching   Hives    Swelling in the face   A hoarse voice   Breathing problems   Other new symptoms    Serious problems are rare.  They may include:   Irregular heartbeat    Seizures   Kidney failure             Tissue damage   Shock     Death    If you have any problems during the exam, we  will treat them right away.    When you get home    Call your hospital if you have any new symptoms in the next 2 days, like hives or swelling. (Phone numbers are at the bottom of this page.) Or call your family doctor.     If you have wheezing or trouble breathing, call 911.    Self-care  -Drink at least 4 extra glasses of water today.   This reduces the stress on your kidneys.  -Keep taking your regular medicines.    The contrast will pass out of your body in your  Urine(pee). This will happen in the next 24 hours. You  will not feel this. Your urine will not  change color.    If you have kidney problems or take metformin    Drink 4 to 8 large glasses of water for the next  2 days, if you are not on a fluid restriction.    ?If you take metformin (Glucophage or Glucovance) for diabetes, keep taking it.      ?Your kidney function tests are abnormal.  If you take Metformin, do not take it for 48 hours. Please go to your clinic for a blood test within 3 days after your exam before the restarting this medicine.     (Note to provider:please give patient prescription for lab tests.)    ?Special instructions: -    I have read and understand the above information.    Patient Sign  Here:______________________________________Date:________Time:______    Staff Sign Here:________________________________________Date:_______Time:______      Radiology Departments:     ?[facility]: [phone] ?[place]: [phone]     ?[place]: [phone] ?[place]:[phone]      ?[place]: [phone]  ?[place]: [phone]  ?[place]:[phone]    ?[facility]:[phone]  ?[facility]:[phone]

## 2022-11-16 NOTE — ED PROVIDER NOTES
"  History     Chief Complaint   Patient presents with     Abdominal Pain     Nausea & Vomiting     HPI  Jani Langford is a 42 year old male who brought into the ER via EMS.  He has had nausea and vomiting since 630 this morning.  He was crying in triage and states \"I cannot take this anymore\".  He has been afebrile but diaphoretic.  Some shortness of breath.  He has a positive psoas sign in triage and left.  Patient with apparent negative home COVID this morning.  We had a busy ER and the patient was placed in triage where he fell out of his wheelchair onto the floor.  He was laying on the floor crying.  Neelima the house supervisor was informed.  Patient reports to RN that \"I do not have my fucking meds with me that is why it is like this\".    Allergies:  Allergies   Allergen Reactions     Abilify [Aripiprazole]      Side effects     Bee Venom      Latex Hives     Percocet [Oxycodone-Acetaminophen]      Short of breath vomiting     Toradol      seizure     Tramadol      Short of breath and vomiting     Tegretol [Carbamazepine] Rash       Problem List:    Patient Active Problem List    Diagnosis Date Noted     Anxiety 05/28/2019     Priority: Medium     Patient is followed by  for ongoing prescription of benzodiazepines.  All refills should be approved by this provider, or covering partner.    Medication(s): Klonopin 0.5 and 1 mg.   Maximum quantity per month: 30  Clinic visit frequency required: Q 3 months     Controlled substance agreement on file: Yes       Date(s): 5.24.19  Benzodiazepine use reviewed by psychiatry:  Yes       Date(s): 5.24.19    Last MNPMP website verification:  done on 5.24.19  [email or web address]           Decreased sex drive 03/14/2017     Priority: Medium     Gastroesophageal reflux disease without esophagitis 03/14/2017     Priority: Medium     Bipolar disorder with psychotic features (H) 11/25/2016     Priority: Medium     History of mental disorder 10/28/2013 "     Priority: Medium     Posttraumatic stress disorder 10/28/2013     Priority: Medium     Insomnia 10/25/2013     Priority: Medium     Attention deficit disorder 08/07/2012     Priority: Medium        Past Medical History:    No past medical history on file.    Past Surgical History:    Past Surgical History:   Procedure Laterality Date     DENTAL SURGERY      pulled 2 teeth month ago     ENT SURGERY      sinus       Family History:    Family History   Problem Relation Age of Onset     Mental Illness Mother      Depression Mother        Social History:  Marital Status:  Single [1]  Social History     Tobacco Use     Smoking status: Every Day     Packs/day: 0.75     Years: 24.00     Pack years: 18.00     Types: Vaping Device, Other, Cigarettes     Start date: 1/1/1995     Last attempt to quit: 3/12/2019     Years since quitting: 3.6     Smokeless tobacco: Never     Tobacco comments:     vaping sometimes- noted 2/20/20   Substance Use Topics     Alcohol use: Not Currently     Comment: rare     Drug use: Yes     Types: Marijuana     Comment: on medical marijuana         Medications:    albuterol (PROAIR HFA/PROVENTIL HFA/VENTOLIN HFA) 108 (90 Base) MCG/ACT inhaler  atorvastatin (LIPITOR) 40 MG tablet  EPINEPHrine (ANY BX GENERIC EQUIV) 0.3 MG/0.3ML injection 2-pack  medical cannabis XX (Patient's own supply)  Multiple Vitamin (ONE-DAILY MULTI-VITAMIN PO)  omeprazole (PRILOSEC) 20 MG DR capsule  PROTOPIC 0.1 % external ointment  QUEtiapine ER (SEROQUEL XR) 400 MG 24 hr tablet  triamcinolone (ARISTOCORT HP) 0.5 % external cream          Review of Systems   Constitutional: Positive for activity change and appetite change. Negative for chills and fever.   HENT: Negative for drooling and facial swelling.    Eyes: Negative for redness.   Respiratory: Negative for cough and shortness of breath.    Cardiovascular: Negative for chest pain.   Gastrointestinal: Positive for abdominal pain, nausea and vomiting. Negative for  constipation.   Genitourinary: Negative for flank pain.   Musculoskeletal: Negative for back pain.   Skin: Negative for pallor.   Neurological: Negative for tremors, seizures and facial asymmetry.   Psychiatric/Behavioral: Positive for agitation and behavioral problems. Negative for confusion. The patient is nervous/anxious.    All other systems reviewed and are negative.      Physical Exam   BP: 129/86  Pulse: 85  Temp: 97.9  F (36.6  C)  Resp: 24  SpO2: 99 %    Vitals:    11/16/22 1902 11/16/22 1922 11/16/22 1937 11/16/22 1951   BP: 116/82 117/81 121/92    Pulse: 92 77     Resp:       Temp:    99  F (37.2  C)   TempSrc:    Tympanic   SpO2:       Weight:       Height:           Physical Exam  Vitals and nursing note reviewed.   Constitutional:       General: He is not in acute distress.     Appearance: Normal appearance. He is ill-appearing. He is not toxic-appearing.   HENT:      Head: Normocephalic.      Nose: Nose normal.   Eyes:      General: No scleral icterus.     Extraocular Movements: Extraocular movements intact.   Neck:      Trachea: No tracheal deviation.   Cardiovascular:      Rate and Rhythm: Normal rate.   Pulmonary:      Effort: Pulmonary effort is normal. No respiratory distress.      Breath sounds: No stridor.      Comments: SaO2 is 90% on room air.  He is not appear to be in any respiratory distress.  No tachypnea.  Abdominal:      General: Bowel sounds are normal.      Palpations: Abdomen is soft. There is no mass.      Tenderness: There is abdominal tenderness. There is no right CVA tenderness, left CVA tenderness, guarding or rebound.      Comments: Minimal generalized abdominal pain no rebound or masses.  Bowel sounds are hypoactive.   Musculoskeletal:         General: Normal range of motion.      Cervical back: Normal range of motion.   Skin:     General: Skin is warm and dry.      Coloration: Skin is not jaundiced or pale.   Neurological:      General: No focal deficit present.      Mental  Status: He is alert and oriented to person, place, and time.   Psychiatric:         Attention and Perception: Attention normal.         Mood and Affect: Mood normal.         ED Course         Results for orders placed or performed during the hospital encounter of 11/16/22 (from the past 24 hour(s))   CBC with Platelets & Differential    Narrative    The following orders were created for panel order CBC with Platelets & Differential.  Procedure                               Abnormality         Status                     ---------                               -----------         ------                     CBC with platelets and d...[[id number]]  Abnormal            Final result                 Please view results for these tests on the individual orders.   Comprehensive metabolic panel   Result Value Ref Range    Sodium 141 136 - 145 mmol/L    Potassium 3.7 3.4 - 5.3 mmol/L    Chloride 100 98 - 107 mmol/L    Carbon Dioxide (CO2) 22 22 - 29 mmol/L    Anion Gap 19 (H) 7 - 15 mmol/L    Urea Nitrogen 10.6 6.0 - 20.0 mg/dL    Creatinine 0.93 0.67 - 1.17 mg/dL    Calcium 10.8 (H) 8.6 - 10.0 mg/dL    Glucose 128 (H) 70 - 99 mg/dL    Alkaline Phosphatase 154 (H) 40 - 129 U/L    AST 37 10 - 50 U/L    ALT 66 (H) 10 - 50 U/L    Protein Total 8.5 (H) 6.4 - 8.3 g/dL    Albumin 5.4 (H) 3.5 - 5.2 g/dL    Bilirubin Total 0.9 <=1.2 mg/dL    GFR Estimate >90 >60 mL/min/1.73m2   CBC with platelets and differential   Result Value Ref Range    WBC Count 12.2 (H) 4.0 - 11.0 10e3/uL    RBC Count 5.56 4.40 - 5.90 10e6/uL    Hemoglobin 17.6 13.3 - 17.7 g/dL    Hematocrit 50.1 40.0 - 53.0 %    MCV 90 78 - 100 fL    MCH 31.7 26.5 - 33.0 pg    MCHC 35.1 31.5 - 36.5 g/dL    RDW 11.7 10.0 - 15.0 %    Platelet Count 237 150 - 450 10e3/uL    % Neutrophils 80 %    % Lymphocytes 14 %    % Monocytes 5 %    % Eosinophils 0 %    % Basophils 0 %    % Immature Granulocytes 1 %    NRBCs per 100 WBC 0 <1 /100    Absolute Neutrophils 9.7 (H) 1.6 - 8.3 10e3/uL     Absolute Lymphocytes 1.7 0.8 - 5.3 10e3/uL    Absolute Monocytes 0.7 0.0 - 1.3 10e3/uL    Absolute Eosinophils 0.0 0.0 - 0.7 10e3/uL    Absolute Basophils 0.0 0.0 - 0.2 10e3/uL    Absolute Immature Granulocytes 0.1 <=0.4 10e3/uL    Absolute NRBCs 0.0 10e3/uL   Extra Tube    Narrative    The following orders were created for panel order Extra Tube.  Procedure                               Abnormality         Status                     ---------                               -----------         ------                     Extra Blue Top Tube[[id number]]                              In process                 Extra Red Top Tube[[id number]]                               In process                 Extra Green Top (Lithium...[[id number]]                      In process                   Please view results for these tests on the individual orders.       Medications   0.9% sodium chloride BOLUS (0 mLs Intravenous Stopped 11/16/22 1814)     Followed by   0.9% sodium chloride BOLUS (0 mLs Intravenous Stopped 11/16/22 2054)     Followed by   sodium chloride 0.9% infusion (has no administration in time range)   LORazepam (ATIVAN) injection 1 mg (has no administration in time range)   LORazepam (ATIVAN) injection 0.5 mg (0.5 mg Intravenous Given 11/16/22 1729)   ondansetron (ZOFRAN ODT) ODT tab 4 mg (has no administration in time range)   ondansetron (ZOFRAN ODT) ODT tab 4 mg (4 mg Oral Given 11/16/22 1510)   ondansetron (ZOFRAN) injection 4 mg (4 mg Intravenous Given 11/16/22 1724)   iopamidol (ISOVUE-370) solution 100 mL (100 mLs Intravenous Given 11/16/22 1717)   sodium chloride (PF) 0.9% PF flush 60 mL (60 mLs Intravenous Given 11/16/22 1717)   metoclopramide (REGLAN) injection 10 mg (10 mg Intravenous Given 11/16/22 1951)       Assessments & Plan (with Medical Decision Making)     I have reviewed the nursing notes.    I have reviewed the findings, diagnosis, plan and need for follow up with the patient.      Discharge  "Medication List as of 11/16/2022  8:54 PM      START taking these medications    Details   metoclopramide (REGLAN) 10 MG tablet Take 1 tablet (10 mg) by mouth 3 times daily as needed (nausea and vomiting), Disp-20 tablet, R-0, Local Print      ondansetron (ZOFRAN ODT) 4 MG ODT tab Take 1 tablet (4 mg) by mouth every 6 hours as needed for nausea, Disp-20 tablet, R-0, Local Print             Final diagnoses:   Gastritis   Nausea with vomiting     Jani Langford is a 42 year old male who brought into the ER via EMS.  He has had nausea and vomiting since 630 this morning.  He was crying in triage and states \"I cannot take this anymore\".  He has been afebrile but diaphoretic.  Some shortness of breath.  He has a positive psoas sign in triage and left.  Patient with apparent negative home COVID this morning.  We had a busy ER and the patient was placed in triage where he fell out of his wheelchair onto the floor.  He was laying on the floor crying.  Neelima the house supervisor was informed.  Patient reports to RN that \"I do not have my fucking meds with me that is why it is like this\".  Physical exam shows patient to have generalized abdominal pain.  He is writhing in the bed.  Nonspecific pain.  Nausea and vomiting.  IV established and he was given Zofran and Ativan initially.  Lactic acid is elevated 4.7.  CMP shows the patient's alk phos is elevated at 154 and ALT is slightly elevated at 66.  AST remains normal.  GFR is normal.  Patient's magnesium is 1.7.  CRP is normal.  Procalcitonin is normal which is reassuring.  Influenza, RSV, and COVID tests are negative.  CT of his abdomen and pelvis shows  No acute findings which is reassuring.  The patient does have some diverticulosis but no signs of diverticulitis.  His nausea continued and he was given Reglan.  Gradually with time his symptoms are feeling much better.  His lactic acid was reevaluated and has decreased gradually over time to 1.5.  He feels comfortable " returning home at this time.  I discussed increased fluid intake and continued monitoring.  Rx for Zofran and Reglan for symptomatic support.  I explained my diagnostic considerations and recommendations and the patient voiced an understanding and was in agreement with the treatment plan. All questions were answered to the best of my ability.  We discussed potential side effects of any prescribed or recommended therapies, as well as expectations for response to treatments.      11/16/2022   [facility]     Alton Srivastava PA-C  11/16/22 212

## 2022-11-16 NOTE — ED NOTES
Pt arrived via ambulance at 0630 woke up and felt queezy.  Went to the bathroom, went to sit down in chair and then started to vomit.  Pt throat was hurting and was vomiting up bile.  Tried Pepto, didn't help as he stated he vomited it up.  Thinks he vomited over 2 dozen times today.  Also diarrhea about 4 times no blood in it.    Pt currently reports that nausea is better after having the ODT, feels a little nauseated but not to the point \to vomit.  Does have muscle pain from the vomiting.

## 2022-11-16 NOTE — ED NOTES
Pt reports at this time he doesn't need any additional anti-nausea med or anything for his anxiety

## 2022-11-16 NOTE — ED NOTES
"Patient fell forward out of his wheelchair onto floor in the waiting room. Was laying on the floor crying. Sydney, house supervisor informed. Patient brought to ER room 7. Patient crying and upset and states he is dizzy. Instructed patient to slow his breathing as that is part of why he is dizzy. Patient yelled \"I don't have my fucking meds with me that's why it's like this.\"   "

## 2022-11-16 NOTE — ED TRIAGE NOTES
"Presents via EMS, pt has had NV since 0630 this morning. Abdominal pain. Pt is crying in triage \"I can't do this anymore.\" Pt is afebrile and diaphoretic. Pt states some SOB. Pt has positive trousseaus sign in triage on left arm with BP. Pt had negative home COVID this morning.      "

## 2022-11-17 DIAGNOSIS — F31.9 BIPOLAR I DISORDER (H): ICD-10-CM

## 2022-11-17 NOTE — ED NOTES
Pt reports that shortly after I medicated with Zofran did vomit once but nothing after that  does feel nauseated but anxiety is gone.  IV infusing without any problems.

## 2022-11-17 NOTE — ED NOTES
Discharge instructions reviewed with patient.  2 Rx for nausea given and will liz at pharmacy of choice. Encouraged to return with new or worsening symptoms.  Declined discharge vitals.  ambulated out of ED with scripts and AVS in hands.  Friend to drive pt home.

## 2022-11-21 RX ORDER — QUETIAPINE 400 MG/1
400 TABLET, FILM COATED, EXTENDED RELEASE ORAL AT BEDTIME
Qty: 90 TABLET | Refills: 3 | Status: SHIPPED | OUTPATIENT
Start: 2022-11-21 | End: 2023-11-20

## 2023-05-10 DIAGNOSIS — E78.2 MIXED HYPERLIPIDEMIA: ICD-10-CM

## 2023-05-11 NOTE — TELEPHONE ENCOUNTER
Atorvastatin      Last Written Prescription Date:  10/21/22  Last Fill Quantity: 90,   # refills: 1  Last Office Visit: 10/17/22  Future Office visit:

## 2023-05-15 RX ORDER — ATORVASTATIN CALCIUM 40 MG/1
40 TABLET, FILM COATED ORAL DAILY
Qty: 90 TABLET | Refills: 1 | Status: SHIPPED | OUTPATIENT
Start: 2023-05-15 | End: 2023-12-04

## 2023-07-10 ENCOUNTER — NURSE TRIAGE (OUTPATIENT)
Dept: FAMILY MEDICINE | Facility: OTHER | Age: 43
End: 2023-07-10

## 2023-07-10 ENCOUNTER — HOSPITAL ENCOUNTER (EMERGENCY)
Facility: HOSPITAL | Age: 43
Discharge: HOME | End: 2023-07-10
Attending: PHYSICIAN ASSISTANT | Admitting: PHYSICIAN ASSISTANT
Payer: COMMERCIAL

## 2023-07-10 VITALS
SYSTOLIC BLOOD PRESSURE: 123 MMHG | RESPIRATION RATE: 16 BRPM | TEMPERATURE: 98.4 F | OXYGEN SATURATION: 96 % | DIASTOLIC BLOOD PRESSURE: 79 MMHG | HEART RATE: 77 BPM

## 2023-07-10 DIAGNOSIS — J20.9 ACUTE BRONCHITIS WITH SYMPTOMS > 10 DAYS: ICD-10-CM

## 2023-07-10 PROCEDURE — G0463 HOSPITAL OUTPT CLINIC VISIT: HCPCS

## 2023-07-10 PROCEDURE — 99213 OFFICE O/P EST LOW 20 MIN: CPT | Performed by: PHYSICIAN ASSISTANT

## 2023-07-10 RX ORDER — PREDNISONE 20 MG/1
TABLET ORAL
Qty: 10 TABLET | Refills: 0 | Status: SHIPPED | OUTPATIENT
Start: 2023-07-10 | End: 2024-01-05

## 2023-07-10 RX ORDER — AZITHROMYCIN 250 MG/1
TABLET, FILM COATED ORAL
Qty: 6 TABLET | Refills: 0 | Status: SHIPPED | OUTPATIENT
Start: 2023-07-10 | End: 2023-07-15

## 2023-07-10 ASSESSMENT — ENCOUNTER SYMPTOMS
DIAPHORESIS: 1
WHEEZING: 1
APPETITE CHANGE: 1
SHORTNESS OF BREATH: 1
COUGH: 1
FEVER: 0
ACTIVITY CHANGE: 1
FATIGUE: 1

## 2023-07-10 ASSESSMENT — ACTIVITIES OF DAILY LIVING (ADL): ADLS_ACUITY_SCORE: 33

## 2023-07-10 NOTE — TELEPHONE ENCOUNTER
"Patient is requesting to be seen by provider    Reason for Disposition    Wheezing is present    Answer Assessment - Initial Assessment Questions  1. ONSET: \"When did the cough begin?\"       One week ago  2. SEVERITY: \"How bad is the cough today?\"       Dry but productive  3. SPUTUM: \"Describe the color of your sputum\" (none, dry cough; clear, white, yellow, green)      Yellow and green  4. HEMOPTYSIS: \"Are you coughing up any blood?\" If so ask: \"How much?\" (flecks, streaks, tablespoons, etc.)      no  5. DIFFICULTY BREATHING: \"Are you having difficulty breathing?\" If Yes, ask: \"How bad is it?\" (e.g., mild, moderate, severe)     - MILD: No SOB at rest, mild SOB with walking, speaks normally in sentences, can lie down, no retractions, pulse < 100.     - MODERATE: SOB at rest, SOB with minimal exertion and prefers to sit, cannot lie down flat, speaks in phrases, mild retractions, audible wheezing, pulse 100-120.     - SEVERE: Very SOB at rest, speaks in single words, struggling to breathe, sitting hunched forward, retractions, pulse > 120       Sob at night.  When he takes a deep breath he has a sharp pain below the left pec  6. FEVER: \"Do you have a fever?\" If Yes, ask: \"What is your temperature, how was it measured, and when did it start?\"      no  7. CARDIAC HISTORY: \"Do you have any history of heart disease?\" (e.g., heart attack, congestive heart failure)       no  8. LUNG HISTORY: \"Do you have any history of lung disease?\"  (e.g., pulmonary embolus, asthma, emphysema)      Uses an inhaler  9. PE RISK FACTORS: \"Do you have a history of blood clots?\" (or: recent major surgery, recent prolonged travel, bedridden)      no  10. OTHER SYMPTOMS: \"Do you have any other symptoms?\" (e.g., runny nose, wheezing, chest pain)        Runny nose  Feel's like he might have sinus infection and wheezing  11. PREGNANCY: \"Is there any chance you are pregnant?\" \"When was your last menstrual period?\"        no  12. TRAVEL: \"Have you " "traveled out of the country in the last month?\" (e.g., travel history, exposures)        no    Protocols used: COUGH - ACUTE [id number]      "

## 2023-07-10 NOTE — ED TRIAGE NOTES
Pt presents with c/o having SOB and pt states has been wheezing   Pt is having a productive cough and coughing up yellow/greeen sputum  Pt concerned about pneumonia states when he had it last time a few years ago this is how he felt so wanted to come in and make sure   Pt is not a smoker, does have seasonal allergies   S/x started a week ago   No otc meds taken today    Triage Assessment     Row Name 07/10/23 [id number]       Triage Assessment (Adult)    Airway WDL WDL       Respiratory WDL    Respiratory WDL cough    Cough Type dry;nonproductive       Skin Circulation/Temperature WDL    Skin Circulation/Temperature WDL WDL       Cardiac WDL    Cardiac WDL WDL       Peripheral/Neurovascular WDL    Peripheral Neurovascular WDL WDL       Cognitive/Neuro/Behavioral WDL    Cognitive/Neuro/Behavioral WDL WDL

## 2023-07-10 NOTE — ED PROVIDER NOTES
"  History     Chief Complaint   Patient presents with     Cough     C/o cough, slight shortness of breath and slight pain while breathing in \"sometimes\". Concerned about pneumonia due to hx of pneumonia 1-2 yrs ago.      HPI  Jani Langford is a 43 year old male who presents with worsening cough, productive & yellow-green. He reports first noticing cold Sx 2 Fridays ago. Has been using albuterol with no overall improvement. Reports sweats, < appetite and < energy. Chest tightness & shortness of breath started today, prompting visit.     Allergies:  Allergies   Allergen Reactions     Abilify [Aripiprazole]      Side effects     Bee Venom      Ketorolac Tromethamine      seizure     Latex Hives     Percocet [Oxycodone-Acetaminophen]      Short of breath vomiting     Tramadol      Short of breath and vomiting     Tegretol [Carbamazepine] Rash       Problem List:    Patient Active Problem List    Diagnosis Date Noted     Anxiety 05/28/2019     Priority: Medium     Patient is followed by  for ongoing prescription of benzodiazepines.  All refills should be approved by this provider, or covering partner.    Medication(s): Klonopin 0.5 and 1 mg.   Maximum quantity per month: 30  Clinic visit frequency required: Q 3 months     Controlled substance agreement on file: Yes       Date(s): 5.24.19  Benzodiazepine use reviewed by psychiatry:  Yes       Date(s): 5.24.19    Last [place]NPMP website verification:  done on 5.24.19  [email or web address]           Decreased sex drive 03/14/2017     Priority: Medium     Gastroesophageal reflux disease without esophagitis 03/14/2017     Priority: Medium     Bipolar disorder with psychotic features (H) 11/25/2016     Priority: Medium     History of mental disorder 10/28/2013     Priority: Medium     Posttraumatic stress disorder 10/28/2013     Priority: Medium     Insomnia 10/25/2013     Priority: Medium     Attention deficit disorder 08/07/2012     Priority: Medium    "     Past Medical History:    History reviewed. No pertinent past medical history.    Past Surgical History:    Past Surgical History:   Procedure Laterality Date     DENTAL SURGERY      pulled 2 teeth month ago     ENT SURGERY      sinus       Family History:    Family History   Problem Relation Age of Onset     Mental Illness Mother      Depression Mother        Social History:  Marital Status:  Single [1]  Social History     Tobacco Use     Smoking status: Every Day     Packs/day: 0.75     Years: 24.00     Pack years: 18.00     Types: Vaping Device, Other, Cigarettes     Start date: 1995     Last attempt to quit: 3/12/2019     Years since quittin.3     Smokeless tobacco: Never     Tobacco comments:     vaping sometimes- noted 20   Substance Use Topics     Alcohol use: Not Currently     Comment: rare     Drug use: Yes     Types: Marijuana     Comment: on medical marijuana         Medications:    albuterol (PROAIR HFA/PROVENTIL HFA/VENTOLIN HFA) 108 (90 Base) MCG/ACT inhaler  atorvastatin (LIPITOR) 40 MG tablet  azithromycin (ZITHROMAX) 250 MG tablet  EPINEPHrine (ANY BX GENERIC EQUIV) 0.3 MG/0.3ML injection 2-pack  medical cannabis XX (Patient's own supply)  metoclopramide (REGLAN) 10 MG tablet  Multiple Vitamin (ONE-DAILY MULTI-VITAMIN PO)  omeprazole (PRILOSEC) 20 MG DR capsule  ondansetron (ZOFRAN ODT) 4 MG ODT tab  predniSONE (DELTASONE) 20 MG tablet  PROTOPIC 0.1 % external ointment  QUEtiapine ER (SEROQUEL XR) 400 MG 24 hr tablet  triamcinolone (ARISTOCORT HP) 0.5 % external cream          Review of Systems   Constitutional: Positive for activity change, appetite change, diaphoresis and fatigue. Negative for fever.   HENT: Positive for congestion.    Respiratory: Positive for cough, shortness of breath and wheezing.        Physical Exam   BP: 123/79  Pulse: 77  Temp: 98.4  F (36.9  C)  Resp: 16  SpO2: 96 %      Physical Exam  Vitals and nursing note reviewed.   Constitutional:       Appearance:  He is ill-appearing. He is not toxic-appearing.   HENT:      Right Ear: Tympanic membrane normal.      Left Ear: Tympanic membrane normal.      Mouth/Throat:      Mouth: Mucous membranes are moist.      Pharynx: Oropharynx is clear.   Eyes:      Conjunctiva/sclera: Conjunctivae normal.   Cardiovascular:      Rate and Rhythm: Normal rate and regular rhythm.   Pulmonary:      Effort: Pulmonary effort is normal.      Breath sounds: Normal breath sounds. No wheezing (using albuterol) or rales.   Skin:     General: Skin is warm and dry.   Neurological:      Mental Status: He is alert.         ED Course     No results found for this or any previous visit (from the past 24 hour(s)).  Medications - No data to display    Assessments & Plan (with Medical Decision Making)     I have reviewed the nursing notes.  I have reviewed the findings, diagnosis, plan and need for follow up with the patient.      Discharge Medication List as of 7/10/2023  5:33 PM      START taking these medications    Details   azithromycin (ZITHROMAX) 250 MG tablet Take 2 tablets (500 mg) by mouth daily for 1 day, THEN 1 tablet (250 mg) daily for 4 days., Disp-6 tablet, R-0, E-Prescribe      predniSONE (DELTASONE) 20 MG tablet Take two tablets (= 40mg) each day for 5 (five) days, Disp-10 tablet, R-0, E-Prescribe             Final diagnoses:   Acute bronchitis with symptoms > 10 days   D/T duration & worsening offered XR, but Pt prefers to start Tx. Will start Rx above. Ideally recheck in 1-2 weeks w/ FP, but will come back in here/ER with any worsening despite plan above.    7/10/2023   [facility]     Gianni Houston PA  07/10/23 [id number]

## 2023-07-10 NOTE — DISCHARGE INSTRUCTIONS
Zithromax for 5 days  Prednisone: try to finish 3 full days, I did write for 5.   2 puffs every 4-6 hrs of the inhaler for 3-7 days.

## 2023-09-07 DIAGNOSIS — E78.2 MIXED HYPERLIPIDEMIA: ICD-10-CM

## 2023-09-08 RX ORDER — ATORVASTATIN CALCIUM 40 MG/1
40 TABLET, FILM COATED ORAL DAILY
Qty: 90 TABLET | Refills: 1 | OUTPATIENT
Start: 2023-09-08

## 2023-11-20 DIAGNOSIS — F31.9 BIPOLAR I DISORDER (H): ICD-10-CM

## 2023-11-20 RX ORDER — QUETIAPINE 400 MG/1
400 TABLET, FILM COATED, EXTENDED RELEASE ORAL AT BEDTIME
Qty: 90 TABLET | Refills: 0 | Status: SHIPPED | OUTPATIENT
Start: 2023-11-20 | End: 2024-01-05

## 2023-12-04 DIAGNOSIS — E78.2 MIXED HYPERLIPIDEMIA: ICD-10-CM

## 2023-12-04 RX ORDER — ATORVASTATIN CALCIUM 40 MG/1
40 TABLET, FILM COATED ORAL DAILY
Qty: 90 TABLET | Refills: 0 | Status: SHIPPED | OUTPATIENT
Start: 2023-12-04 | End: 2024-03-20

## 2023-12-27 NOTE — PROGRESS NOTES
PSYCHIATRY CLINIC PROGRESS NOTE   20 minute medication management, more than 50% of time spent counseling patient on medications, medication side effects, symptom history and management   SUBJECTIVE / INTERIM HISTORY                                                                        Social- with ISABEL Sandhu and her kids Children- Phoebe's kids are 5, 7 , and 10 yo. Ozzy is the 15 yo Wiener dog  Last visit : Continue Seroquel 400 mg .    continue Klonopin 0.5 mg and Klonopin 1 mg HS      - stuff going with their 8 yo at time. Thus anxiety and irritability  - Phoebe with today. Both agree in terms of his mental health he is doing better despite sleeping poorly  - klonopin helps with anxiety more than sleep.   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  - aggressive in past while manic. Hasn't had any major ordeals but has been agitated, snappy, irritability.     SUBSTANCE USE- smokes 1/2 PPD since age 17 yo.  past use  Meth: ~2 + years clean. , MJ. TX: [place] and [facility]. EtOH now rare.    SYMPTOMS-  low energy, low mood. Insomnia still.  Passive SI: no intent or plan. Paranoia has improved. No manic sx.  nightmares, flashbacks, detached, angry outbursts, self-destructive, startle response, hypervigilance and fear Sleep now: about 2-3 hours then up for awhile, then another hour. His goal would be 6 continuous.  MEDICAL ROS-had cough, some SOB recently and feeling better.  appetite increased (in relation to Seroquel)  SOB: usually at night. But sometimes during day as well not even with exertion.  increased appetite / weight  MEDICAL / SURGICAL HISTORY                   Patient Active Problem List   Diagnosis     Bipolar disorder with psychotic features (H)     Insomnia     History of mental disorder     Attention deficit disorder     Posttraumatic stress disorder     Decreased sex drive     Gastroesophageal reflux disease without esophagitis     Anxiety     ALLERGY   Abilify  History  Chief Complaint   Patient presents with    Back Pain     Patient presents to ED with left back pain that radiates down left leg. Patient reports being seen two weeks ago for similar issue, states pain returned after finishing the steroids     Patient is a 66 y/o F with h/o chronic back pain that presents to the ED with worsening back pain.  The pain is located left lower back and radiates down her leg leg.  She was seen in the ER  given steroids and states the pain improved.  She was seen by pain management on  and has an MRI ordered for .  She has been taking tramadol, but states it doesn't help. Pain is worse with slightest movement.  No bowel/bladder dysfunction, no weakness.  She has chronic numbness of left lateral leg, but no new numbness.  No fevers, chills.        History provided by:  Patient  Back Pain  Associated symptoms: no chest pain, no dysuria, no fever, no numbness and no weakness        Prior to Admission Medications   Prescriptions Last Dose Informant Patient Reported? Taking?   BIOTIN PO  Self Yes No   Sig: Take by mouth Two tablets once day   Calcium 250 MG CAPS  Self Yes No   Sig: Take 500 mg by mouth daily   Cholecalciferol (VITAMIN D3) 2000 units TABS  Self Yes No   Sig: Take 2 tablets by mouth daily   Magnesium 200 MG TABS  Self Yes No   Sig: Take 2 tablets by mouth daily   Multiple Vitamins-Minerals (Bariatric Multivitamins/Iron) CAPS  Self Yes No   Sig: Take 2 capsules by mouth in the morning   Synthroid 150 MCG tablet   No No   Si tab 6 days of the week, 0.5 tabs Sundays. Dose change   buPROPion (Wellbutrin SR) 150 mg 12 hr tablet  Self No No   Sig: Take 1 tablet (150 mg total) by mouth 2 (two) times a day   famotidine (PEPCID) 20 mg tablet  Self Yes No   Sig: Take 20 mg by mouth 2 (two) times a day as needed for heartburn   gabapentin (NEURONTIN) 400 mg capsule   No No   Sig: Take 1 capsule (400 mg total) by mouth 3 (three) times a day   hydrOXYzine HCL  "[aripiprazole]; Bee venom; Latex; Percocet [oxycodone-acetaminophen]; Toradol; Tramadol; and Tegretol [carbamazepine]  MEDICATIONS                                                                                             Current Outpatient Medications   Medication Sig     albuterol (PROAIR HFA/PROVENTIL HFA/VENTOLIN HFA) 108 (90 Base) MCG/ACT inhaler Inhale 2 puffs into the lungs every 6 hours     clonazePAM (KLONOPIN) 0.5 MG tablet Take 1 tablet (0.5 mg) by mouth as needed for anxiety     clonazePAM (KLONOPIN) 1 MG tablet Take 1 tablet (1 mg) by mouth At Bedtime     omeprazole (PRILOSEC) 20 MG DR capsule TAKE 1 CAPSULE (20 MG) BY MOUTH DAILY     QUEtiapine ER (SEROQUEL XR) 400 MG 24 hr tablet TAKE 1 TABLET BY MOUTH NIGHTLY AT BEDTIME     No current facility-administered medications for this visit.          PAST MEDICATION TRIALS    Prozac (fluoxetine), Zoloft (sertraline), Paxil (paroxetine) and Cymbalta (duloxetine). Paxil sexual SEs and constipation. Mirtazapine ineffective for insomnia.   Elavil (amitriptyline).   Lithium Carbonate, Depakote and Depakot ER (valproate) and Neurontin. Topamax didn't help (gabapentin). Lithium \"that was like eating pennies and nickels\" but helped / effective. Didn't like the blood level draws with lithium. Doesn't think has been on tegretol or trileptal.   Seroquel (quetiapine)., Abilify   no older antipsychotics.   Xanax (alprazolam). Doesn't think has been on Buspar   Ambien (zolpidem) and Lunesta (eszopiclone). Lunesta helped initial insomnia, NOT middle. Ambien made him violent and agitated.       VITALS   /72 (BP Location: Left arm, Cuff Size: Adult Regular)   Pulse 79   Temp 97.3  F (36.3  C) (Tympanic)   Ht 1.829 m (6')   Wt 98.9 kg (218 lb)   SpO2 96%   BMI 29.57 kg/m       PHQ9                     [unfilled]  LABS                                                                                                                           Recent Labs   Lab " (ATARAX) 10 mg tablet  Self No No   Sig: Take 1 tablet (10 mg total) by mouth every 8 (eight) hours as needed for anxiety   methocarbamol (ROBAXIN) 500 mg tablet   No No   Sig: Take 1 tablet (500 mg total) by mouth 2 (two) times a day   tiZANidine (ZANAFLEX) 4 mg tablet   No No   Sig: TAKE 1 TABLET BY MOUTH EVERY 8 HOURS IF NEEDED FOR MUSCLE SPASM OR PAIN   traMADol-acetaminophen (ULTRACET) 37.5-325 mg per tablet   No No   Sig: Take 1 tablet by mouth every 6 (six) hours as needed for moderate pain   venlafaxine (EFFEXOR) 100 MG tablet   No No   Sig: TAKE 2 TABLETS BY MOUTH EVERY MORNING AND 1 TABLET EVERY EVENING      Facility-Administered Medications: None       Past Medical History:   Diagnosis Date    Allergic     Anxiety     Arthritis     Bariatric surgery status     Chronic pain disorder     Depression     last assessed: 2018     Disturbance of memory     last assessed: 2016     Endometriosis     GERD (gastroesophageal reflux disease)     Hiatal hernia     Hirsutism     History of sleeve gastrectomy     Lumbar herniated disc     Menometrorrhagia     Morbid obesity (HCC)     last assessed: Dec 5, 2014     Motion sickness     Postgastrectomy malabsorption     Sclerosing adenosis of breast     Symptomatic menopausal or female climacteric states     Uterine leiomyoma        Past Surgical History:   Procedure Laterality Date    APPENDECTOMY      BREAST EXCISIONAL BIOPSY Left 2010    Benign    COLONOSCOPY      complete - 7 year repeat recommended - Resolved:      DILATION AND CURETTAGE OF UTERUS      FRACTURE SURGERY      GASTRECTOMY SLEEVE LAPAROSCOPIC  2018    GYNECOLOGIC CRYOSURGERY      Cervix - 's for abnormal paps     HYSTERECTOMY  2006    INDUCED       x3    LAPAROSCOPIC SALPINGOOPHERECTOMY Right     LAPAROSCOPIC TOTAL HYSTERECTOMY      06, Laparoscopic hysterectomy with LSO with vaginal closure of the vaginal cuff     VT REVJ GSTR/JJ ANAST W/RCNSTJ W/O VGTMY  N/A 2021    Procedure: LAPAROSCOPIC REVISION CONVERSION  JENN-EN-Y GASTRIC BYPASS WITH ROBOTICS AND INTRAOPERATIVE EGD, PARAESOPHAGEAL HERNIA REPAIR;  Surgeon: Sheldon Núñez MD;  Location: [facility];  Service: Bariatrics    SALPINGOOPHORECTOMY Left 2006    Right ovary removed at age 19 and the left one was removed with uterus.    STOMACH SURGERY  2014    for morbid obesity - Managed by: Sheldon Hendricks -     TONSILLECTOMY AND ADENOIDECTOMY      TOOTH EXTRACTION         Family History   Problem Relation Age of Onset    Hypertension Mother     Alzheimer's disease Mother     Stroke Mother         several mini strokes    Dementia Mother         Alzheimers    Thyroid disease Mother     Arthritis Mother     Hyperlipidemia Father     Diabetes Father         mellitus     Prostate cancer Father 70    Rectal cancer Father         80's    Lung cancer Father 90    Liver cancer Father 90    Heart attack Father     Colon cancer Father         80's    Cancer Father         Rectal, metastisized    Hearing loss Father     Skin cancer Father         Multiple times-unknown age    Hypertension Sister     Colon polyps Sister     No Known Problems Daughter     Breast cancer Maternal Grandmother         Unknown age    No Known Problems Maternal Grandfather     No Known Problems Paternal Grandmother     No Known Problems Paternal Grandfather     Hyperlipidemia Brother         no more     I have reviewed and agree with the history as documented.    E-Cigarette/Vaping    E-Cigarette Use Current Some Day User      E-Cigarette/Vaping Substances    Nicotine No     THC Yes     CBD No     Flavoring No     Other No     Unknown No      Social History     Tobacco Use    Smoking status: Former     Current packs/day: 0.00     Average packs/day: 0.5 packs/day for 17.8 years (8.9 ttl pk-yrs)     Types: Cigarettes     Start date: 3/4/1971     Quit date: 1989     Years since quittin.0    Smokeless tobacco: Never    Tobacco  Test  05/21/18   0856  11/22/17   0914   CHOL  203*  216*   HDL  24*  25*   LDL  142*  132*   TRIG  184*  295*     Lab Results   Component Value Date    TSH 1.27 05/21/2018     Last Basic Metabolic Panel:  Lab Results   Component Value Date     05/21/2018      Lab Results   Component Value Date    POTASSIUM 3.8 05/21/2018     Lab Results   Component Value Date    CHLORIDE 103 05/21/2018     Lab Results   Component Value Date    TOO 9.7 05/21/2018     Lab Results   Component Value Date    CO2 29 05/21/2018     Lab Results   Component Value Date    BUN 8 05/21/2018     Lab Results   Component Value Date    CR 1.09 05/21/2018     Lab Results   Component Value Date    GLC 81 05/21/2018          ms (on Seroquel) March 2017    MENTAL STATUS EXAM                                                                                        Alert. Oriented to person, place, and date / time. Casually groomed, calm, cooperative with good eye contact. No problems with speech. Psychomotor: unremarkable . Mood was irritable and affect was congruent to speech content and full range. Thought process, including associations, was unremarkable and thought content was devoid of suicidal and homicidal ideation and psychotic thought. No hallucinations. Insight was good. Judgment was intact and adequate for safety. Fund of knowledge was intact. Pt demonstrates no obvious problems with attention, concentration, language, recent or remote memory although these were not formally tested.     ASSESSMENT                                                                                                      HISTORICAL:  Initial psych note 9/14/16        NOTES:  Depakote in past didn't like it. Lithium in past metallic past but did work: doesn't want to go on it again. Trazodone: restless leg. Abilify: restless / akathisia. Tegretol rash. Recalls gabapentin years ago ineffective. Rozerem not helping / ineffective. Ambien he had bad / violent  comments:     33 years smoke free!   Vaping Use    Vaping status: Some Days    Substances: THC   Substance Use Topics    Alcohol use: Not Currently     Comment: occasional    Drug use: Yes     Types: Marijuana     Comment: medical       Review of Systems   Constitutional:  Negative for chills and fever.   HENT: Negative.     Respiratory:  Negative for cough and shortness of breath.    Cardiovascular:  Negative for chest pain.   Gastrointestinal: Negative.    Genitourinary:  Negative for dysuria.   Musculoskeletal:  Positive for back pain. Negative for neck pain.   Skin:  Negative for color change, pallor and rash.   Neurological:  Negative for dizziness, weakness and numbness.   Psychiatric/Behavioral:  Negative for confusion.    All other systems reviewed and are negative.      Physical Exam  Physical Exam  Vitals and nursing note reviewed.   Constitutional:       General: She is in acute distress (patient appears to be in pain.).      Appearance: Normal appearance. She is well-developed, well-groomed and normal weight. She is not ill-appearing or diaphoretic.   HENT:      Head: Normocephalic and atraumatic.      Right Ear: Hearing normal.      Left Ear: Hearing normal.      Nose: Nose normal.   Eyes:      Conjunctiva/sclera: Conjunctivae normal.   Cardiovascular:      Rate and Rhythm: Normal rate and regular rhythm.      Heart sounds: Normal heart sounds.   Pulmonary:      Effort: Pulmonary effort is normal.      Breath sounds: Normal breath sounds. No wheezing, rhonchi or rales.   Abdominal:      General: Abdomen is flat. Bowel sounds are normal.      Palpations: Abdomen is soft.      Tenderness: There is no abdominal tenderness.   Musculoskeletal:      Cervical back: Normal and normal range of motion. No spinous process tenderness or muscular tenderness.      Thoracic back: Normal.      Lumbar back: Tenderness (left lower back) present. No deformity or bony tenderness. Negative right straight leg raise test and  reaction  Jani Langford is a 38 yo male with history of bipolar disorder, PTSD and chem dep - on Seroquel total 700 mg and been on for awhile. Main issue is sleep : has not had luck with sleep meds so discussed I will certainly try but given he has not found past meds effective more unlikely we are going to find med that works. We tried Remeron with no luck. We tried Topamax and no luck. He saw Dr. Garcia  and started on Mirapex. We tried Belsomra and didn't help.    Our change of Seroquel to Abilify for Jani went well initially. Then he had akathisia really bad. He has been taking Seroquel 400 mg. I was thinking carbamazepine but he had rash in past. Thinking latuda but Jani deosn't want to take that given risk for akathisia (had bad akthisia wth Abilify). ? Saphris in future instead of the Seroquel.     Today we agreed on continue Seroquel 400 mg daily     Jani signed controlled substance contract couple visits ago and submitted UDS as part of.       TREATMENT RISK STATEMENT:  The risks, benefits, alternatives and potential adverse effects have been explained and are understood by the pt.  The pt agrees to the treatment plan with the ability to do so.   The pt knows to call the clinic for any problems or access emergency care if needed.        DIAGNOSES                    PTSD   Bipolar I disorder with history of psychotic features      PLAN                                                                                                                    1)  MEDICATIONS:         -- Continue Seroquel 400 mg .    continue Klonopin 0.5 mg and Klonopin 1 mg HS and will refill today  2)  THERAPY:  No Change    3)  LABS:  lipid panel and glucose, LFTs, BMP including fasting glucose were done in May '17 and then in nov '17 as was glucose. EKG 3/2017.      4)  PT MONITOR [call for probs]:  Worsening symptoms, SI/HI, SEs from meds    5)  REFERRALS [CD, medical, other]: sleep referral : has apptmt in next couple  negative left straight leg raise test.   Skin:     General: Skin is warm and dry.      Coloration: Skin is not pale.      Findings: No bruising or rash.   Neurological:      Mental Status: She is alert and oriented to person, place, and time.      Sensory: Sensation is intact. No sensory deficit.      Motor: Motor function is intact. No weakness, tremor or abnormal muscle tone.      Gait: Gait abnormal (limited due to pain.).   Psychiatric:         Mood and Affect: Mood normal.         Behavior: Behavior is cooperative.         Vital Signs  ED Triage Vitals [12/27/23 1156]   Temperature Pulse Respirations Blood Pressure SpO2   99.2 °F (37.3 °C) 78 18 114/60 98 %      Temp Source Heart Rate Source Patient Position - Orthostatic VS BP Location FiO2 (%)   Temporal Monitor Sitting Left arm --      Pain Score       10 - Worst Possible Pain           Vitals:    12/27/23 1156   BP: 114/60   Pulse: 78   Patient Position - Orthostatic VS: Sitting         Visual Acuity      ED Medications  Medications   lidocaine (LIDODERM) 5 % patch 1 patch (1 patch Topical Medication Applied 12/27/23 1422)   HYDROmorphone (DILAUDID) injection 1 mg (1 mg Intramuscular Given 12/27/23 1423)       Diagnostic Studies  Results Reviewed       None                   No orders to display              Procedures  Procedures         ED Course                               SBIRT 20yo+      Flowsheet Row Most Recent Value   Initial Alcohol Screen: US AUDIT-C     1. How often do you have a drink containing alcohol? 0 Filed at: 12/27/2023 1155   2. How many drinks containing alcohol do you have on a typical day you are drinking?  0 Filed at: 12/27/2023 1155   3b. FEMALE Any Age, or MALE 65+: How often do you have 4 or more drinks on one occassion? 0 Filed at: 12/27/2023 1155   Audit-C Score 0 Filed at: 12/27/2023 1155   JESSICA: How many times in the past year have you...    Used an illegal drug or used a prescription medication for non-medical reasons?  weeks    6)  RTC: ~2 months                                                                         Never Filed at: 12/27/2023 1155                      Medical Decision Making  Patient with chronic back pain, new new neuro symptoms, will give pain meds and refer to pain management.  Patient has outpt MRI scheduled.     Amount and/or Complexity of Data Reviewed  External Data Reviewed: notes.    Risk  Prescription drug management.             Disposition  Final diagnoses:   Chronic back pain     Time reflects when diagnosis was documented in both MDM as applicable and the Disposition within this note       Time User Action Codes Description Comment    12/27/2023  2:17 PM Ramonita Flores [M54.9,  G89.29] Chronic back pain           ED Disposition       ED Disposition   Discharge    Condition   Stable    Date/Time   Wed Dec 27, 2023 1417    Comment   Lola Mauro discharge to home/self care.                   Follow-up Information       Follow up With Specialties Details Why Contact Chong Tavares, DO Pain Medicine Call today for further treatment of pain [address] [place] [address]  [phone]              Discharge Medication List as of 12/27/2023  2:18 PM        CONTINUE these medications which have NOT CHANGED    Details   BIOTIN PO Take by mouth Two tablets once day, Historical Med      buPROPion (Wellbutrin SR) 150 mg 12 hr tablet Take 1 tablet (150 mg total) by mouth 2 (two) times a day, Starting Tue 9/12/2023, Normal      Calcium 250 MG CAPS Take 500 mg by mouth daily, Historical Med      Cholecalciferol (VITAMIN D3) 2000 units TABS Take 2 tablets by mouth daily, Historical Med      famotidine (PEPCID) 20 mg tablet Take 20 mg by mouth 2 (two) times a day as needed for heartburn, Historical Med      gabapentin (NEURONTIN) 400 mg capsule Take 1 capsule (400 mg total) by mouth 3 (three) times a day, Starting Wed 11/1/2023, Normal      hydrOXYzine HCL (ATARAX) 10 mg tablet Take 1 tablet (10 mg total) by mouth every 8 (eight) hours as needed for anxiety, Starting Thu 7/27/2023,  Normal      Magnesium 200 MG TABS Take 2 tablets by mouth daily, Historical Med      methocarbamol (ROBAXIN) 500 mg tablet Take 1 tablet (500 mg total) by mouth 2 (two) times a day, Starting Tue 12/12/2023, Normal      Multiple Vitamins-Minerals (Bariatric Multivitamins/Iron) CAPS Take 2 capsules by mouth in the morning, Historical Med      Synthroid 150 MCG tablet 1 tab 6 days of the week, 0.5 tabs Sundays. Dose change, Normal      tiZANidine (ZANAFLEX) 4 mg tablet TAKE 1 TABLET BY MOUTH EVERY 8 HOURS IF NEEDED FOR MUSCLE SPASM OR PAIN, Normal      traMADol-acetaminophen (ULTRACET) 37.5-325 mg per tablet Take 1 tablet by mouth every 6 (six) hours as needed for moderate pain, Starting Tue 12/12/2023, Normal      venlafaxine (EFFEXOR) 100 MG tablet TAKE 2 TABLETS BY MOUTH EVERY MORNING AND 1 TABLET EVERY EVENING, Normal             No discharge procedures on file.    PDMP Review         Value Time User    PDMP Reviewed  Yes 12/12/2023 11:11 AM Lucia Lord DO            ED Provider  Electronically Signed by             Ramonita Flores PA-C  12/27/23 0848

## 2024-01-05 ENCOUNTER — OFFICE VISIT (OUTPATIENT)
Dept: PSYCHIATRY | Facility: OTHER | Age: 44
End: 2024-01-05
Attending: PSYCHIATRY & NEUROLOGY
Payer: COMMERCIAL

## 2024-01-05 VITALS
BODY MASS INDEX: 28.34 KG/M2 | TEMPERATURE: 99.1 F | WEIGHT: 214.8 LBS | DIASTOLIC BLOOD PRESSURE: 82 MMHG | SYSTOLIC BLOOD PRESSURE: 136 MMHG | OXYGEN SATURATION: 96 % | HEART RATE: 82 BPM

## 2024-01-05 DIAGNOSIS — F31.9 BIPOLAR I DISORDER (H): ICD-10-CM

## 2024-01-05 LAB
CHOLEST SERPL-MCNC: 130 MG/DL
EST. AVERAGE GLUCOSE BLD GHB EST-MCNC: 117 MG/DL
FASTING STATUS PATIENT QL REPORTED: YES
HBA1C MFR BLD: 5.7 %
HDLC SERPL-MCNC: 32 MG/DL
LDLC SERPL CALC-MCNC: 63 MG/DL
NONHDLC SERPL-MCNC: 98 MG/DL
TRIGL SERPL-MCNC: 174 MG/DL

## 2024-01-05 PROCEDURE — G0463 HOSPITAL OUTPT CLINIC VISIT: HCPCS

## 2024-01-05 PROCEDURE — 80061 LIPID PANEL: CPT | Mod: ZL | Performed by: PSYCHIATRY & NEUROLOGY

## 2024-01-05 PROCEDURE — 36415 COLL VENOUS BLD VENIPUNCTURE: CPT | Mod: ZL | Performed by: PSYCHIATRY & NEUROLOGY

## 2024-01-05 PROCEDURE — 83036 HEMOGLOBIN GLYCOSYLATED A1C: CPT | Mod: ZL | Performed by: PSYCHIATRY & NEUROLOGY

## 2024-01-05 PROCEDURE — 99213 OFFICE O/P EST LOW 20 MIN: CPT | Performed by: PSYCHIATRY & NEUROLOGY

## 2024-01-05 RX ORDER — QUETIAPINE 400 MG/1
400 TABLET, FILM COATED, EXTENDED RELEASE ORAL AT BEDTIME
Qty: 90 TABLET | Refills: 0 | Status: SHIPPED | OUTPATIENT
Start: 2024-01-05 | End: 2024-05-23

## 2024-01-05 ASSESSMENT — PAIN SCALES - GENERAL: PAINLEVEL: NO PAIN (0)

## 2024-01-05 ASSESSMENT — PATIENT HEALTH QUESTIONNAIRE - PHQ9
SUM OF ALL RESPONSES TO PHQ QUESTIONS 1-9: 18
SUM OF ALL RESPONSES TO PHQ QUESTIONS 1-9: 18
10. IF YOU CHECKED OFF ANY PROBLEMS, HOW DIFFICULT HAVE THESE PROBLEMS MADE IT FOR YOU TO DO YOUR WORK, TAKE CARE OF THINGS AT HOME, OR GET ALONG WITH OTHER PEOPLE: VERY DIFFICULT

## 2024-01-05 ASSESSMENT — ANXIETY QUESTIONNAIRES
GAD7 TOTAL SCORE: 14
6. BECOMING EASILY ANNOYED OR IRRITABLE: SEVERAL DAYS
2. NOT BEING ABLE TO STOP OR CONTROL WORRYING: NEARLY EVERY DAY
7. FEELING AFRAID AS IF SOMETHING AWFUL MIGHT HAPPEN: MORE THAN HALF THE DAYS
4. TROUBLE RELAXING: NEARLY EVERY DAY
IF YOU CHECKED OFF ANY PROBLEMS ON THIS QUESTIONNAIRE, HOW DIFFICULT HAVE THESE PROBLEMS MADE IT FOR YOU TO DO YOUR WORK, TAKE CARE OF THINGS AT HOME, OR GET ALONG WITH OTHER PEOPLE: VERY DIFFICULT
8. IF YOU CHECKED OFF ANY PROBLEMS, HOW DIFFICULT HAVE THESE MADE IT FOR YOU TO DO YOUR WORK, TAKE CARE OF THINGS AT HOME, OR GET ALONG WITH OTHER PEOPLE?: VERY DIFFICULT
7. FEELING AFRAID AS IF SOMETHING AWFUL MIGHT HAPPEN: MORE THAN HALF THE DAYS
3. WORRYING TOO MUCH ABOUT DIFFERENT THINGS: MORE THAN HALF THE DAYS
GAD7 TOTAL SCORE: 14
1. FEELING NERVOUS, ANXIOUS, OR ON EDGE: MORE THAN HALF THE DAYS
GAD7 TOTAL SCORE: 14
5. BEING SO RESTLESS THAT IT IS HARD TO SIT STILL: SEVERAL DAYS

## 2024-01-05 ASSESSMENT — COLUMBIA-SUICIDE SEVERITY RATING SCALE - C-SSRS
1. IN THE PAST MONTH, HAVE YOU WISHED YOU WERE DEAD OR WISHED YOU COULD GO TO SLEEP AND NOT WAKE UP?: NO
6. IN YOUR LIFETIME, HAVE YOU EVER DONE ANYTHING, STARTED TO DO ANYTHING, OR PREPARED TO DO ANYTHING TO END YOUR LIFE?: YES
2. HAVE YOU ACTUALLY HAD ANY THOUGHTS OF KILLING YOURSELF?: NO

## 2024-01-05 NOTE — PROGRESS NOTES
SUBJECTIVE / INTERIM HISTORY                                                                        Social- with GF Phoebe and her kids Children- Phoebe's kids are 5, 7 , and 12 yo.   Last visit Feb ' 22: Continue Seroquel 400 mg, I set to fill for 3/2/22      - Moose the [facility] is ~5 months  - having issues with landlord. Roof leaking and wasn't fixed.   - thankfully despite ongoing issues with his apartment hasn't had any big outbursts  - looking at a place in [place]    MEDICAL / SURGICAL HISTORY                   Patient Active Problem List   Diagnosis    Bipolar disorder with psychotic features (H)    Insomnia    History of mental disorder    Attention deficit disorder    Posttraumatic stress disorder    Decreased sex drive    Gastroesophageal reflux disease without esophagitis    Anxiety     ALLERGY   Abilify [aripiprazole], Bee venom, Ketorolac tromethamine, Latex, Percocet [oxycodone-acetaminophen], Tramadol, and Tegretol [carbamazepine]  MEDICATIONS                                                                                             Current Outpatient Medications   Medication Sig    albuterol (PROAIR HFA/PROVENTIL HFA/VENTOLIN HFA) 108 (90 Base) MCG/ACT inhaler Inhale 2 puffs into the lungs every 6 hours as needed for shortness of breath / dyspnea or wheezing    atorvastatin (LIPITOR) 40 MG tablet Take 1 tablet (40 mg) by mouth daily    EPINEPHrine (ANY BX GENERIC EQUIV) 0.3 MG/0.3ML injection 2-pack INJECT 0.3 MLS (0.3 MG) INTO THE MUSCLE ONCE FOR 1 DOSE    medical cannabis XX (Patient's own supply) Take by mouth See Admin Instructions From [facility]    metoclopramide (REGLAN) 10 MG tablet Take 1 tablet (10 mg) by mouth 3 times daily as needed (nausea and vomiting)    Multiple Vitamin (ONE-DAILY MULTI-VITAMIN PO)     omeprazole (PRILOSEC) 20 MG DR capsule TAKE 1 CAPSULE BY MOUTH    ondansetron (ZOFRAN ODT) 4 MG ODT tab Take 1 tablet (4 mg) by mouth every 6 hours as needed for nausea     "PROTOPIC 0.1 % external ointment Apply topically 2 times daily    QUEtiapine ER (SEROQUEL XR) 400 MG 24 hr tablet TAKE 1 TABLET (400 MG) BY MOUTH AT BEDTIME    triamcinolone (ARISTOCORT HP) 0.5 % external cream Apply topically 2 times daily     No current facility-administered medications for this visit.         PAST MEDICATION TRIALS    Prozac (fluoxetine), Zoloft (sertraline), Paxil (paroxetine) and Cymbalta (duloxetine). Paxil sexual SEs and constipation. Mirtazapine ineffective for insomnia.   Elavil (amitriptyline).   Lithium Carbonate, Depakote and Depakot ER (valproate) and Neurontin. Topamax didn't help (gabapentin). Lithium \"that was like eating pennies and nickels\" but helped / effective. Didn't like the blood level draws with lithium. Doesn't think has been on tegretol or trileptal.   Seroquel (quetiapine)., Abilify   no older antipsychotics.   Xanax (alprazolam). Doesn't think has been on Buspar   Ambien (zolpidem) and Lunesta (eszopiclone). Lunesta helped initial insomnia, NOT middle. Ambien made him violent and agitated.       VITALS   /82   Pulse 82   Temp 99.1  F (37.3  C) (Tympanic)   Wt 97.4 kg (214 lb 12.8 oz)   SpO2 96%   BMI 28.34 kg/m       PHQ9                     [unfilled]  LABS                                                                                                                           Recent Labs   Lab Test 01/05/24  1138 04/29/22  0847   CHOL 130 130   HDL 32* 28*   LDL 63 43   TRIG 174* 294*      Lab Results   Component Value Date    TSH 1.27 05/21/2018     Hemoglobin A1C   Date Value Ref Range Status   01/05/2024 5.7 (H) <5.7 % Final     Comment:     Normal <5.7%   Prediabetes 5.7-6.4%    Diabetes 6.5% or higher     Note: Adopted from ADA consensus guidelines.        MENTAL STATUS EXAM                                                                                       Awake, alert.  Mood anxious, depressed. Speech normal volume, rhythm, rate.  Thought " process, including associations, was unremarkable and thought content was devoid of suicidal and homicidal ideation and psychotic thought. No hallucinations. Insight was good. Judgment was intact and adequate for safety. Fund of knowledge was intact. Pt demonstrates no obvious problems with attention, concentration, language, recent or remote memory although these were not formally tested.     ASSESSMENT                                                                                                      HISTORICAL:  Initial psych note 9/14/16        NOTES:  Depakote in past didn't like it. Lithium in past metallic past but did work: doesn't want to go on it again. Trazodone: restless leg. Abilify: restless / akathisia. Tegretol rash. Recalls gabapentin years ago ineffective. Rozerem not helping / ineffective. Ambien he had bad / violent reaction  Jani Langford is a 44 yo male with history of bipolar disorder, PTSD and chem dep. We've tried Jani on many medications in the past and his current regimen of Seroquel has been the best fit. I haven't seen him for while - his anxiety has been up lately more than usual and we agree likely situational in relation to issues going on with his landlord. Overall, stable with bipolar disorder and PTSD.      TREATMENT RISK STATEMENT:  The risks, benefits, alternatives and potential adverse effects have been explained and are understood by the pt.  The pt agrees to the treatment plan with the ability to do so.   The pt knows to call the clinic for any problems or access emergency care if needed.        DIAGNOSES                    PTSD   Bipolar I disorder with history of psychotic features      PLAN                                                                                                                    1)  MEDICATIONS:         -- Continue Seroquel 400 mg  2)  THERAPY:  No Change    3)  LABS:  lipid panel and A1C today    4)  PT MONITOR [call for probs]:  Worsening  symptoms, SI/HI, SEs from meds    5)  REFERRALS [CD, medical, other]: sleep med referral    6)  RTC: ~6 months        Answers submitted by the patient for this visit:  Patient Health Questionnaire (Submitted on 1/5/2024)  If you checked off any problems, how difficult have these problems made it for you to do your work, take care of things at home, or get along with other people?: Very difficult  PHQ9 TOTAL SCORE: 18  TAMRA-7 (Submitted on 1/5/2024)  TAMRA 7 TOTAL SCORE: 14

## 2024-03-06 NOTE — TELEPHONE ENCOUNTER
omeprazole (PRILOSEC) 20 MG DR capsule   Last Written Prescription Date:  9/7/18  Last Fill Quantity: 30,   # refills: 1  Last Office Visit: 9/13/18  Future Office visit:    Next 5 appointments (look out 90 days)    Jan 18, 2019  8:40 AM CST  (Arrive by 8:25 AM)  Return Visit with Renee Vuong MD  [facility] ([facility] ) [address] [place] [id number] [phone]            Called and spoke with Blas eyal's  (consent in chart) informed him that the OT referral is for Dementia, Evaluation and Treatment. And provide him with the phone number [phone]  to call and schedule for OT.    Lacey Sandhu MA on 3/6/2024 at 2:56 PM

## 2024-03-19 NOTE — PROGRESS NOTES
Assessment & Plan     Mixed hyperlipidemia  Nice review.  Continue the lipitor and follow annually.  No change in the plan.  Stable labs.    - atorvastatin (LIPITOR) 40 MG tablet; Take 1 tablet (40 mg) by mouth daily    Gastroesophageal reflux disease without esophagitis  Stable.  Continue without change.  Sent.   - omeprazole (PRILOSEC) 20 MG DR capsule; Take 1 capsule (20 mg) by mouth daily    Hx of acute bronchitis with bronchospasm  Sent the inhaler to have on hand.  Advise tobacco cessation.   - albuterol (PROAIR HFA/PROVENTIL HFA/VENTOLIN HFA) 108 (90 Base) MCG/ACT inhaler; Inhale 2 puffs into the lungs every 6 hours as needed for shortness of breath or wheezing    Insomnia, unspecified type      Infective otitis externa, bilateral  Reviewed.  Suspect Q tip as the cause.  Advised on how this works, etc.  Drops to treat the acute inflammation/infection.    - neomycin-polymyxin-hydrocortisone (CORTISPORIN) 3.5-64750-5 otic solution; Place 3 drops in ear(s) 4 times daily                  No follow-ups on file.    Dedra Gunter is a 43 year old, presenting for the following health issues:  Recheck Medication    HPI     Medication Followup of omeprazole   Taking Medication as prescribed: yes  Side Effects:  None  Medication Helping Symptoms:  yes    Derm problem     Duration: ongoing   Description (location/character/radiation): bilateral ears   Intensity:  moderate  Accompanying signs and symptoms: dry flaky skin, itchy   History (similar episodes/previous evaluation): None  Precipitating or alleviating factors: None  Therapies tried and outcome: None            Review of Systems  Constitutional, HEENT, cardiovascular, pulmonary, gi and gu systems are negative, except as otherwise noted.      Objective    /62   Pulse 94   Temp 98  F (36.7  C) (Tympanic)   Wt 98.9 kg (218 lb)   SpO2 94%   BMI 28.76 kg/m    Body mass index is 28.76 kg/m .  Physical Exam   GENERAL: alert and no distress  EYES: Eyes  grossly normal to inspection, PERRL and conjunctivae and sclerae normal  HENT: normal cephalic/atraumatic, both ears: red and boggy canal, nose and mouth without ulcers or lesions, oropharynx clear, and oral mucous membranes moist  NECK: no adenopathy, no asymmetry, masses, or scars  RESP: lungs clear to auscultation - no rales, rhonchi or wheezes  CV: regular rate and rhythm, normal S1 S2, no S3 or S4, no murmur, click or rub, no peripheral edema  ABDOMEN: soft, nontender, no hepatosplenomegaly, no masses and bowel sounds normal  MS: no gross musculoskeletal defects noted, no edema    Labs reviewed.          Signed Electronically by: Selwyn Lopez MD

## 2024-03-20 ENCOUNTER — TRANSFERRED RECORDS (OUTPATIENT)
Dept: HEALTH INFORMATION MANAGEMENT | Facility: CLINIC | Age: 44
End: 2024-03-20

## 2024-03-20 ENCOUNTER — OFFICE VISIT (OUTPATIENT)
Dept: FAMILY MEDICINE | Facility: OTHER | Age: 44
End: 2024-03-20
Attending: FAMILY MEDICINE
Payer: COMMERCIAL

## 2024-03-20 VITALS
DIASTOLIC BLOOD PRESSURE: 62 MMHG | HEART RATE: 94 BPM | BODY MASS INDEX: 28.76 KG/M2 | TEMPERATURE: 98 F | WEIGHT: 218 LBS | OXYGEN SATURATION: 94 % | SYSTOLIC BLOOD PRESSURE: 110 MMHG

## 2024-03-20 DIAGNOSIS — K21.9 GASTROESOPHAGEAL REFLUX DISEASE WITHOUT ESOPHAGITIS: ICD-10-CM

## 2024-03-20 DIAGNOSIS — H60.393 INFECTIVE OTITIS EXTERNA, BILATERAL: ICD-10-CM

## 2024-03-20 DIAGNOSIS — G47.00 INSOMNIA, UNSPECIFIED TYPE: ICD-10-CM

## 2024-03-20 DIAGNOSIS — E78.2 MIXED HYPERLIPIDEMIA: ICD-10-CM

## 2024-03-20 DIAGNOSIS — Z87.09 HX OF ACUTE BRONCHITIS WITH BRONCHOSPASM: Primary | ICD-10-CM

## 2024-03-20 PROCEDURE — G0463 HOSPITAL OUTPT CLINIC VISIT: HCPCS

## 2024-03-20 PROCEDURE — 99214 OFFICE O/P EST MOD 30 MIN: CPT | Performed by: FAMILY MEDICINE

## 2024-03-20 RX ORDER — NEOMYCIN SULFATE, POLYMYXIN B SULFATE, HYDROCORTISONE 3.5; 10000; 1 MG/ML; [USP'U]/ML; MG/ML
3 SOLUTION/ DROPS AURICULAR (OTIC) 4 TIMES DAILY
Qty: 5 ML | Refills: 0 | Status: SHIPPED | OUTPATIENT
Start: 2024-03-20

## 2024-03-20 RX ORDER — ALBUTEROL SULFATE 90 UG/1
2 AEROSOL, METERED RESPIRATORY (INHALATION) EVERY 6 HOURS PRN
Qty: 18 G | Refills: 3 | Status: SHIPPED | OUTPATIENT
Start: 2024-03-20

## 2024-03-20 RX ORDER — ATORVASTATIN CALCIUM 40 MG/1
40 TABLET, FILM COATED ORAL DAILY
Qty: 90 TABLET | Refills: 3 | Status: SHIPPED | OUTPATIENT
Start: 2024-03-20

## 2024-03-20 ASSESSMENT — ANXIETY QUESTIONNAIRES
GAD7 TOTAL SCORE: 14
4. TROUBLE RELAXING: NEARLY EVERY DAY
IF YOU CHECKED OFF ANY PROBLEMS ON THIS QUESTIONNAIRE, HOW DIFFICULT HAVE THESE PROBLEMS MADE IT FOR YOU TO DO YOUR WORK, TAKE CARE OF THINGS AT HOME, OR GET ALONG WITH OTHER PEOPLE: VERY DIFFICULT
1. FEELING NERVOUS, ANXIOUS, OR ON EDGE: NEARLY EVERY DAY
7. FEELING AFRAID AS IF SOMETHING AWFUL MIGHT HAPPEN: SEVERAL DAYS
5. BEING SO RESTLESS THAT IT IS HARD TO SIT STILL: SEVERAL DAYS
8. IF YOU CHECKED OFF ANY PROBLEMS, HOW DIFFICULT HAVE THESE MADE IT FOR YOU TO DO YOUR WORK, TAKE CARE OF THINGS AT HOME, OR GET ALONG WITH OTHER PEOPLE?: VERY DIFFICULT
3. WORRYING TOO MUCH ABOUT DIFFERENT THINGS: MORE THAN HALF THE DAYS
2. NOT BEING ABLE TO STOP OR CONTROL WORRYING: MORE THAN HALF THE DAYS
GAD7 TOTAL SCORE: 14
6. BECOMING EASILY ANNOYED OR IRRITABLE: MORE THAN HALF THE DAYS
GAD7 TOTAL SCORE: 14
7. FEELING AFRAID AS IF SOMETHING AWFUL MIGHT HAPPEN: SEVERAL DAYS

## 2024-03-20 ASSESSMENT — PAIN SCALES - GENERAL: PAINLEVEL: NO PAIN (0)

## 2024-03-20 NOTE — COMMUNITY RESOURCES LIST (ENGLISH)
March 20, 2024           YOUR PERSONALIZED LIST OF SERVICES & PROGRAMS           NAVIGATION    Eligibility Screening      [facility] application assistance  [address] [place] [address] (Distance: 0.7 miles)  Language: English  Fee: Free  Accessibility: Translation services      [facility] Health insurance application assistance  [address] [place] [address] (Distance: 0.7 miles)  Language: English  Fee: Free  Accessibility: Translation services      [employer] [place] - SNAP (formerly food stamps) Screening and Application help  Phone: (414) 684-6827  Website: https://www.[employer].org/programs/[place]-food-helpline/  Language: English  Hours: Mon 10:00 AM - 5:00 PM Tue 10:00 AM - 5:00 PM Wed 10:00 AM - 5:00 PM Thu 10:00 AM - 5:00 PM Fri 10:00 AM - 5:00 PM  Fee: Free  Accessibility: [place] accessible, Blind accommodation, Deaf or hard of hearing, Translation services        ASSISTANCE    Nutrition Benefits      [facility] application assistance  [address] [place] [address] (Distance: 0.7 miles)  Language: English  Fee: Free  Accessibility: Translation services      [facility] application assistance  [address] [place] [address] (Distance: 0.7 miles)  Language: English  Fee: Free      [employer] [place] - SNAP (formerly food stamps) Screening and Application help  Phone: (903) 173-9966  Website: https://www.[employer].org/programs/[place]-food-helpline/  Language: English  Hours: Mon 10:00 AM - 5:00 PM Tue 10:00 AM - 5:00 PM Wed 10:00 AM - 5:00 PM Thu 10:00 AM - 5:00 PM Fri 10:00 AM - 5:00 PM  Fee: Free  Accessibility: [place] accessible, Blind accommodation, Deaf or hard of hearing, Translation services    [facility]      [employer] - [facility] Free Pantry  [address] [place] [address] (Distance: 20.0 miles)  Language: English  Fee: Free      [facility] pantry - [facility]  [address]  [place] [address] (Distance: 0.2 miles)  Language: English  Fee: Free, Self pay      [employer] - [employer]  Phone: (319) 424-5007  Website: www.[facility].org  Language: English, Italian  Hours: Mon 9:00 AM - 3:30 PM Tue 9:00 AM - 6:30 PM Wed 9:00 AM - 3:30 PM Thu 9:00 AM - 12:30 PM Fri 9:00 AM - 12:30 PM Sat 9:00 AM - 12:00 PM  Fee: Free        & SHELTER    Case Management      [employer] - Housing search assistance  [address] [place] [address] (Distance: 6.6 miles)  Language: English  Fee: Free      [employer] - Housing Stabilization Services  Phone: (896) 199-3027  Website: https://homebasemn.com/  Language: English  Hours: Mon 8:00 AM - 4:00 PM Tue 8:00 AM - 4:00 PM Wed 8:00 AM - 4:00 PM Thu 8:00 AM - 4:00 PM Fri 8:00 AM - 4:00 PM  Fee: Free  Accessibility: Blind accommodation, Deaf or hard of hearing  Transportation Options: Free transportation    Payment Assistance      [employer] - [employer]  Phone: (301) 207-1384  Website: https://www.hzo11-ftgldzptoctznv.org/programs.html  Language: English  Hours: Mon 7:00 AM - 7:00 PM Tue 7:00 AM - 7:00 PM Wed 7:00 AM - 7:00 PM Thu 7:00 AM - 7:00 PM Fri 7:00 AM - 7:00 PM  Fee: Free    Mediation & Eviction Prevention      Social Service (LSS) - Eviction and foreclosure prevention  [address] [place] [address] (Distance: 19.4 miles)  Phone: (749) 357-7293  Language: English, Italian, Hmong  Fee: Free  Accessibility: [place] accessible      Line - Tenant Rights / Eviction Prevention  Website: https://[employer].org/l-yzhy-bc-/  Language: English, Italian               IMPORTANT NUMBERS & WEBSITES        Emergency Services  911  .   [facility]  211 http://211[facility].org  .   Poison Control  (943) 262-5190 http://mnpoison.org http://wis[place]poison.org  .     Suicide and Crisis Lifeline  988 http://988[facility] Health Centerline.org  .   [facility] National  Child Abuse Hotline  325.552.1038 http://Childhelphotline.org   .   National Sexual Assault Hotline  (502) 294-3717 (HOPE) http://Rainn.org   .     National Runaway Safeline  (668) 809-1482 (RUNAWAY) http://[employer].Linq3  .   Pregnancy & Postpartum Support  Call/text 749-757-0221  [place]: http://ppsupportmn.org  [place]: http://psichapters.com/wi  .   Substance Abuse National Helpline ([facility])  561-999-HELP (9198) http://Findtreatment.gov   .                DISCLAIMER: [employer] does not endorse any service providers mentioned in this resource list. [employer] does not guarantee that the services mentioned in this resource list will be available to you or will improve your health or wellness.    [facility]

## 2024-05-22 DIAGNOSIS — F31.9 BIPOLAR I DISORDER (H): ICD-10-CM

## 2024-05-22 NOTE — TELEPHONE ENCOUNTER
Seroquel      Last Written Prescription Date:  1/5/24  Last Fill Quantity: 90,   # refills: 0  Last Office Visit: 3/20/24  Future Office visit:       Routing refill request to provider for review/approval because:

## 2024-05-23 RX ORDER — QUETIAPINE 400 MG/1
400 TABLET, FILM COATED, EXTENDED RELEASE ORAL AT BEDTIME
Qty: 90 TABLET | Refills: 3 | Status: SHIPPED | OUTPATIENT
Start: 2024-05-23 | End: 2024-08-26

## 2024-06-05 ENCOUNTER — TELEPHONE (OUTPATIENT)
Dept: PSYCHIATRY | Facility: OTHER | Age: 44
End: 2024-06-05

## 2024-06-05 NOTE — TELEPHONE ENCOUNTER
Reason for call:  Medication      Have you contacted your pharmacy?  No refills    If patient has contacted Pharmacy and it has been over 72hrs, continue to #2  Medication Patient is leaving for [place] and is running low on Quetiapine. He needs it refilled at [facility]  What Pharmacy do you use? [facility]      (Please note that the turn-around-time for prescriptions is 72 business hours; I am sending your request at this time. SEND TO appropriate Care Team Pool )

## 2024-06-05 NOTE — TELEPHONE ENCOUNTER
Return call to patient.  Noted 3 refills given for the Seroquel on 5-23-24.  Patient will discuss with pharmacy.  Thank you

## 2024-07-18 ENCOUNTER — TELEPHONE (OUTPATIENT)
Dept: FAMILY MEDICINE | Facility: OTHER | Age: 44
End: 2024-07-18

## 2024-07-18 DIAGNOSIS — Z91.030 BEE STING ALLERGY: Primary | ICD-10-CM

## 2024-07-18 RX ORDER — EPINEPHRINE 0.3 MG/.3ML
0.3 INJECTION SUBCUTANEOUS PRN
Qty: 2 EACH | Refills: 1 | Status: SHIPPED | OUTPATIENT
Start: 2024-07-18

## 2024-07-18 NOTE — TELEPHONE ENCOUNTER
11:24 AM    Reason for Call: Phone Call    Description: pt needs a new epi pen    Was an appointment offered for this call? No  If yes : Appointment type              Date    Preferred method for responding to this message: Telephone Call  What is your phone number ?[phone]     If we cannot reach you directly, may we leave a detailed response at the number you provided? Yes    Can this message wait until your PCP/provider returns, if available today? Not applicable    Kylee Jean

## 2024-08-26 DIAGNOSIS — F31.9 BIPOLAR I DISORDER (H): ICD-10-CM

## 2024-08-26 RX ORDER — QUETIAPINE 400 MG/1
400 TABLET, FILM COATED, EXTENDED RELEASE ORAL AT BEDTIME
Qty: 90 TABLET | Refills: 3 | Status: SHIPPED | OUTPATIENT
Start: 2024-08-26

## 2024-08-26 NOTE — TELEPHONE ENCOUNTER
Seroquel       Last Written Prescription Date:  5/23/2024  Last Fill Quantity: 90,   # refills: 3  Last Office Visit: 3/20/2024  Future Office visit:

## 2025-03-11 ENCOUNTER — OFFICE VISIT (OUTPATIENT)
Dept: FAMILY MEDICINE | Facility: OTHER | Age: 45
End: 2025-03-11
Attending: FAMILY MEDICINE
Payer: COMMERCIAL

## 2025-03-11 ENCOUNTER — TELEPHONE (OUTPATIENT)
Dept: FAMILY MEDICINE | Facility: OTHER | Age: 45
End: 2025-03-11

## 2025-03-11 VITALS
WEIGHT: 200 LBS | HEART RATE: 94 BPM | DIASTOLIC BLOOD PRESSURE: 60 MMHG | SYSTOLIC BLOOD PRESSURE: 102 MMHG | TEMPERATURE: 98.5 F | OXYGEN SATURATION: 94 % | BODY MASS INDEX: 26.39 KG/M2

## 2025-03-11 DIAGNOSIS — J32.9 SINUSITIS, UNSPECIFIED CHRONICITY, UNSPECIFIED LOCATION: Primary | ICD-10-CM

## 2025-03-11 PROCEDURE — G0463 HOSPITAL OUTPT CLINIC VISIT: HCPCS

## 2025-03-11 ASSESSMENT — ANXIETY QUESTIONNAIRES
8. IF YOU CHECKED OFF ANY PROBLEMS, HOW DIFFICULT HAVE THESE MADE IT FOR YOU TO DO YOUR WORK, TAKE CARE OF THINGS AT HOME, OR GET ALONG WITH OTHER PEOPLE?: SOMEWHAT DIFFICULT
3. WORRYING TOO MUCH ABOUT DIFFERENT THINGS: SEVERAL DAYS
7. FEELING AFRAID AS IF SOMETHING AWFUL MIGHT HAPPEN: SEVERAL DAYS
GAD7 TOTAL SCORE: 9
1. FEELING NERVOUS, ANXIOUS, OR ON EDGE: MORE THAN HALF THE DAYS
7. FEELING AFRAID AS IF SOMETHING AWFUL MIGHT HAPPEN: SEVERAL DAYS
2. NOT BEING ABLE TO STOP OR CONTROL WORRYING: SEVERAL DAYS
6. BECOMING EASILY ANNOYED OR IRRITABLE: SEVERAL DAYS
GAD7 TOTAL SCORE: 9
IF YOU CHECKED OFF ANY PROBLEMS ON THIS QUESTIONNAIRE, HOW DIFFICULT HAVE THESE PROBLEMS MADE IT FOR YOU TO DO YOUR WORK, TAKE CARE OF THINGS AT HOME, OR GET ALONG WITH OTHER PEOPLE: SOMEWHAT DIFFICULT
5. BEING SO RESTLESS THAT IT IS HARD TO SIT STILL: SEVERAL DAYS
4. TROUBLE RELAXING: MORE THAN HALF THE DAYS
GAD7 TOTAL SCORE: 9

## 2025-03-11 ASSESSMENT — PAIN SCALES - GENERAL: PAINLEVEL_OUTOF10: NO PAIN (0)

## 2025-03-11 NOTE — TELEPHONE ENCOUNTER
8:59 AM    Reason for Call: OVERBOOK    Patient is having the following symptoms: Coughing and sinus infection for 7 days.    The patient is requesting an appointment for exam with Dr. Lopez.    Was an appointment offered for this call? Yes  If yes : Appointment type: 04/21 was too far away              Date    Preferred method for responding to this message: Telephone Call  What is your phone number ?  [phone]     If we cannot reach you directly, may we leave a detailed response at the number you provided? Yes    Can this message wait until your PCP/provider returns, if unavailable today? Not applicable    Nga Jameson

## 2025-03-11 NOTE — PROGRESS NOTES
Assessment & Plan     Sinusitis, unspecified chronicity, unspecified location  Presumed. Somewhat classic sx.  Cover with augmentin and follow.    - amoxicillin-clavulanate (AUGMENTIN) 875-125 MG tablet; Take 1 tablet by mouth 2 times daily for 10 days.                No follow-ups on file.    Dedra Gunter is a 44 year old, presenting for the following health issues:  URI        3/11/2025    10:32 AM   Additional Questions   Roomed by Rubina SCALES        RESPIRATORY SYMPTOMS    Duration: 1 week   Description  nasal congestion, sore throat, cough, and fatigue/malaise  Severity: moderate  Accompanying signs and symptoms: None  History (predisposing factors):  none  Precipitating or alleviating factors: None  Therapies tried and outcome:  none         Review of Systems  Constitutional, HEENT, cardiovascular, pulmonary, gi and gu systems are negative, except as otherwise noted.      Objective    /60   Pulse 94   Temp 98.5  F (36.9  C) (Tympanic)   Wt 90.7 kg (200 lb)   SpO2 94%   BMI 26.39 kg/m    Body mass index is 26.39 kg/m .  Physical Exam   GENERAL: alert and no distress  EYES: Eyes grossly normal to inspection, PERRL and conjunctivae and sclerae normal  HENT: ear canals and TM's normal, nose and mouth without ulcers or lesions  NECK: no adenopathy, no asymmetry, masses, or scars  RESP: lungs clear to auscultation - no rales, rhonchi or wheezes  CV: regular rate and rhythm, normal S1 S2, no S3 or S4, no murmur, click or rub, no peripheral edema  ABDOMEN: soft, nontender, no hepatosplenomegaly, no masses and bowel sounds normal  MS: no gross musculoskeletal defects noted, no edema    The longitudinal plan of care for the diagnosis(es)/condition(s) as documented were addressed during this visit. Due to the added complexity in care, I will continue to support Jani in the subsequent management and with ongoing continuity of care.        Signed Electronically by: Selwyn Lopez MD

## 2025-07-08 NOTE — DISCHARGE INSTRUCTIONS
Use knee immobilizer and crutches.  Tylenol or ibuprofen as needed for pain.    Call and schedule an appointment with [facility] for further evaluation of your knee.    Return to emergency department for worsening or concerning symptoms.   [Joint Pain] : joint pain [Negative] : Heme/Lymph

## 2025-08-15 DIAGNOSIS — F31.9 BIPOLAR I DISORDER (H): ICD-10-CM

## 2025-08-16 RX ORDER — QUETIAPINE 400 MG/1
400 TABLET, FILM COATED, EXTENDED RELEASE ORAL AT BEDTIME
Qty: 90 TABLET | Refills: 0 | Status: SHIPPED | OUTPATIENT
Start: 2025-08-16

## 2025-08-24 ENCOUNTER — APPOINTMENT (OUTPATIENT)
Dept: GENERAL RADIOLOGY | Facility: HOSPITAL | Age: 45
End: 2025-08-24
Attending: STUDENT IN AN ORGANIZED HEALTH CARE EDUCATION/TRAINING PROGRAM
Payer: COMMERCIAL

## 2025-08-24 ENCOUNTER — HOSPITAL ENCOUNTER (EMERGENCY)
Facility: HOSPITAL | Age: 45
Discharge: HOME | End: 2025-08-24
Attending: PHYSICIAN ASSISTANT
Payer: COMMERCIAL

## 2025-08-24 VITALS
SYSTOLIC BLOOD PRESSURE: 120 MMHG | TEMPERATURE: 97.3 F | RESPIRATION RATE: 16 BRPM | HEART RATE: 90 BPM | DIASTOLIC BLOOD PRESSURE: 80 MMHG | OXYGEN SATURATION: 95 %

## 2025-08-24 DIAGNOSIS — S93.401A RIGHT ANKLE SPRAIN: Primary | ICD-10-CM

## 2025-08-24 PROCEDURE — 73610 X-RAY EXAM OF ANKLE: CPT | Mod: RT

## 2025-08-24 PROCEDURE — 99284 EMERGENCY DEPT VISIT MOD MDM: CPT | Performed by: PHYSICIAN ASSISTANT

## 2025-08-24 ASSESSMENT — COLUMBIA-SUICIDE SEVERITY RATING SCALE - C-SSRS
1. IN THE PAST MONTH, HAVE YOU WISHED YOU WERE DEAD OR WISHED YOU COULD GO TO SLEEP AND NOT WAKE UP?: NO
2. HAVE YOU ACTUALLY HAD ANY THOUGHTS OF KILLING YOURSELF IN THE PAST MONTH?: NO
6. HAVE YOU EVER DONE ANYTHING, STARTED TO DO ANYTHING, OR PREPARED TO DO ANYTHING TO END YOUR LIFE?: YES

## 2025-08-24 ASSESSMENT — ACTIVITIES OF DAILY LIVING (ADL): ADLS_ACUITY_SCORE: 41

## 2025-08-26 ENCOUNTER — PATIENT OUTREACH (OUTPATIENT)
Dept: CARE COORDINATION | Facility: CLINIC | Age: 45
End: 2025-08-26